# Patient Record
Sex: FEMALE | Race: WHITE | NOT HISPANIC OR LATINO | Employment: FULL TIME | URBAN - METROPOLITAN AREA
[De-identification: names, ages, dates, MRNs, and addresses within clinical notes are randomized per-mention and may not be internally consistent; named-entity substitution may affect disease eponyms.]

---

## 2018-01-23 ENCOUNTER — OFFICE VISIT (OUTPATIENT)
Dept: FAMILY MEDICINE CLINIC | Facility: CLINIC | Age: 43
End: 2018-01-23

## 2018-01-23 VITALS
BODY MASS INDEX: 28.99 KG/M2 | OXYGEN SATURATION: 99 % | HEART RATE: 78 BPM | TEMPERATURE: 97.9 F | HEIGHT: 65 IN | WEIGHT: 174 LBS | SYSTOLIC BLOOD PRESSURE: 126 MMHG | DIASTOLIC BLOOD PRESSURE: 82 MMHG

## 2018-01-23 DIAGNOSIS — Z86.69 HISTORY OF MIGRAINE: Primary | ICD-10-CM

## 2018-01-23 PROCEDURE — 99203 OFFICE O/P NEW LOW 30 MIN: CPT | Performed by: PHYSICIAN ASSISTANT

## 2018-01-23 RX ORDER — TOPIRAMATE 25 MG/1
TABLET ORAL
Qty: 60 TABLET | Refills: 11 | Status: SHIPPED | OUTPATIENT
Start: 2018-01-23 | End: 2021-02-08

## 2018-01-23 RX ORDER — TOPIRAMATE 25 MG/1
TABLET ORAL
Refills: 5 | COMMUNITY
Start: 2018-01-02 | End: 2018-01-23 | Stop reason: SDUPTHER

## 2018-01-23 RX ORDER — RIZATRIPTAN BENZOATE 10 MG/1
10 TABLET ORAL ONCE AS NEEDED
COMMUNITY
End: 2018-01-23 | Stop reason: DRUGHIGH

## 2018-01-23 RX ORDER — RIZATRIPTAN BENZOATE 10 MG/1
10 TABLET, ORALLY DISINTEGRATING ORAL ONCE AS NEEDED
Qty: 12 TABLET | Refills: 11 | Status: SHIPPED | OUTPATIENT
Start: 2018-01-23 | End: 2021-02-08

## 2018-01-23 RX ORDER — ONDANSETRON HYDROCHLORIDE 8 MG/1
8 TABLET, FILM COATED ORAL EVERY 8 HOURS PRN
Qty: 15 TABLET | Refills: 11 | Status: SHIPPED | OUTPATIENT
Start: 2018-01-23 | End: 2021-02-08

## 2018-01-23 NOTE — PROGRESS NOTES
Subjective   Elena Copeland is a 42 y.o. female  Chief Complaint: Patient comes in as a new patient to our practice to establish care.  She's here for follow-up on migraines.  History of Present Illness  Patient comes in today as a new patient to establish care.  She wants to discuss her chronic migraines which are currently stable.  She typically gets 1 migraine per month around her menstrual cycle last for 2 days.  This pattern is very stable on Topamax daily.  She has a history of migraines for the next several years and the pattern has not changed.  She states whether changes with premature pressure fluctuations can trigger migraine also.  She gets mild nausea, photophobia, migraine responds nicely to Maxalt and Zofran.  No migraine today.  The following portions of the patient's history were reviewed and updated as appropriate: allergies, current medications, past social history and problem list    Review of Systems   Constitutional: Negative for fatigue and unexpected weight change.   HENT: Negative for congestion, dental problem, postnasal drip, sinus pressure and sore throat.    Eyes: Negative for photophobia, pain and visual disturbance.   Gastrointestinal: Positive for nausea. Negative for vomiting.   Neurological: Positive for headaches. Negative for dizziness, syncope, facial asymmetry, speech difficulty, weakness, light-headedness and numbness.   Psychiatric/Behavioral: Negative for agitation, confusion, dysphoric mood and sleep disturbance. The patient is not nervous/anxious.        Objective     Vitals:    01/23/18 1427   BP: 126/82   Pulse: 78   Temp: 97.9 °F (36.6 °C)   SpO2: 99%       Physical Exam   Constitutional: She is oriented to person, place, and time. She appears well-developed and well-nourished.   HENT:   Head: Normocephalic and atraumatic.   Eyes: Conjunctivae and EOM are normal. Pupils are equal, round, and reactive to light.   Neck: Normal range of motion. Neck supple.    Cardiovascular: Normal rate and regular rhythm.    Pulmonary/Chest: Effort normal.   Neurological: She is alert and oriented to person, place, and time. No cranial nerve deficit or sensory deficit.   Psychiatric: She has a normal mood and affect. Her speech is normal and behavior is normal. Judgment and thought content normal. Cognition and memory are normal.   Nursing note and vitals reviewed.      Assessment/Plan     Diagnoses and all orders for this visit:    History of migraine  -     topiramate (TOPAMAX) 25 MG tablet; Take one twice daily  -     rizatriptan MLT (MAXALT-MLT) 10 MG disintegrating tablet; Take 1 tablet by mouth 1 (One) Time As Needed for Migraine for up to 1 dose. May repeat in 2 hours if needed  -     ondansetron (ZOFRAN) 8 MG tablet; Take 1 tablet by mouth Every 8 (Eight) Hours As Needed for Nausea or Vomiting.    Other orders  -     Discontinue: topiramate (TOPAMAX) 25 MG tablet; TAKE 1 TABLET BY MOUTH TWICE A DAY  -     Discontinue: rizatriptan (MAXALT) 10 MG tablet; Take 10 mg by mouth 1 (One) Time As Needed for Migraine. May repeat in 2 hours if needed

## 2018-11-26 ENCOUNTER — TELEPHONE (OUTPATIENT)
Dept: URGENT CARE | Facility: CLINIC | Age: 43
End: 2018-11-26

## 2018-11-26 NOTE — TELEPHONE ENCOUNTER
Patient referral appointment to ENT made for patient during visit to . Appointment set with [facility] for 11/29/18 at 1100. Patient given appointment information while here, VIKAS

## 2021-02-08 ENCOUNTER — OFFICE VISIT (OUTPATIENT)
Dept: INTERNAL MEDICINE | Facility: CLINIC | Age: 46
End: 2021-02-08

## 2021-02-08 VITALS
OXYGEN SATURATION: 96 % | BODY MASS INDEX: 31.49 KG/M2 | HEART RATE: 92 BPM | TEMPERATURE: 97.7 F | HEIGHT: 65 IN | SYSTOLIC BLOOD PRESSURE: 117 MMHG | WEIGHT: 189 LBS | DIASTOLIC BLOOD PRESSURE: 78 MMHG

## 2021-02-08 DIAGNOSIS — Z13.220 LIPID SCREENING: ICD-10-CM

## 2021-02-08 DIAGNOSIS — Z13.0 SCREENING FOR BLOOD DISEASE: ICD-10-CM

## 2021-02-08 DIAGNOSIS — Z13.21 ENCOUNTER FOR VITAMIN DEFICIENCY SCREENING: ICD-10-CM

## 2021-02-08 DIAGNOSIS — F51.01 PRIMARY INSOMNIA: ICD-10-CM

## 2021-02-08 DIAGNOSIS — F33.1 MODERATE EPISODE OF RECURRENT MAJOR DEPRESSIVE DISORDER (HCC): Primary | ICD-10-CM

## 2021-02-08 DIAGNOSIS — F41.9 ANXIETY: ICD-10-CM

## 2021-02-08 DIAGNOSIS — Z13.29 THYROID DISORDER SCREENING: ICD-10-CM

## 2021-02-08 DIAGNOSIS — Z13.1 SCREENING FOR DIABETES MELLITUS: ICD-10-CM

## 2021-02-08 PROBLEM — U07.1 COVID-19 VIRUS INFECTION: Status: ACTIVE | Noted: 2021-02-08

## 2021-02-08 PROCEDURE — 99214 OFFICE O/P EST MOD 30 MIN: CPT | Performed by: NURSE PRACTITIONER

## 2021-02-08 RX ORDER — PROPRANOLOL HYDROCHLORIDE 20 MG/1
TABLET ORAL
Qty: 60 TABLET | Refills: 1 | Status: SHIPPED | OUTPATIENT
Start: 2021-02-08 | End: 2021-02-08

## 2021-02-08 RX ORDER — TRAZODONE HYDROCHLORIDE 50 MG/1
TABLET ORAL
Qty: 45 TABLET | Refills: 1 | Status: SHIPPED | OUTPATIENT
Start: 2021-02-08 | End: 2021-02-08

## 2021-02-08 RX ORDER — PROPRANOLOL HYDROCHLORIDE 20 MG/1
TABLET ORAL
Qty: 60 TABLET | Refills: 1 | Status: SHIPPED | OUTPATIENT
Start: 2021-02-08 | End: 2021-03-31 | Stop reason: SDUPTHER

## 2021-02-08 RX ORDER — TRAZODONE HYDROCHLORIDE 50 MG/1
TABLET ORAL
Qty: 45 TABLET | Refills: 1 | Status: SHIPPED | OUTPATIENT
Start: 2021-02-08 | End: 2021-03-08

## 2021-02-08 NOTE — PROGRESS NOTES
Subjective   Chief Complaint   Patient presents with   • Establish Care     depression      Elena Copeland is a 45 y.o. female here today for depression, anxiety, and insomnia. Dad passed away about one year ago and she has struggled since. Difficulty with COVID pandemic and social isolation. Feels very sad with lack of motivation. Mood has been unstable with irritability and agitation. Has tried zoloft in the past that helped. Has anxiety with increased heart rate and shakiness. Has recent weight gain and about 40lbs in past year. Not sleeping well. Has difficulty going to sleep and wakes up frequently throughout the night. Has experienced this for years. Taking 10mg of melatonin nightly and no longer helping. Tried multiple OTC sleep aids with no improvement. No thoughts of self harm or harming others.     I have reviewed the following portions of the patient's history and confirmed they are accurate: allergies, current medications, past family history, past medical history, past social history, past surgical history and problem list    I have personally completed the patient's review of systems.    Review of Systems   Constitutional: Negative for activity change, appetite change, chills, diaphoresis, fatigue, fever, unexpected weight gain and unexpected weight loss.   HENT: Negative for ear discharge, ear pain, mouth sores, nosebleeds, sinus pressure, sneezing and sore throat.    Eyes: Negative for pain, discharge and itching.   Respiratory: Negative for cough, chest tightness, shortness of breath and wheezing.    Cardiovascular: Negative for chest pain, palpitations and leg swelling.   Gastrointestinal: Negative for abdominal pain, constipation, diarrhea, nausea and vomiting.   Endocrine: Negative for heat intolerance, polydipsia and polyphagia.   Genitourinary: Negative for dysuria, flank pain, frequency, hematuria and urgency.   Musculoskeletal: Negative for arthralgias, back pain, gait problem, joint  "swelling, myalgias, neck pain and neck stiffness.   Skin: Negative for color change, pallor and rash.   Allergic/Immunologic: Negative for immunocompromised state.   Neurological: Negative for seizures, speech difficulty, weakness and numbness.   Hematological: Negative for adenopathy.   Psychiatric/Behavioral: Positive for decreased concentration, dysphoric mood, sleep disturbance, depressed mood and stress. Negative for agitation and suicidal ideas. The patient is nervous/anxious.        Current Outpatient Medications on File Prior to Visit   Medication Sig   • Omeprazole (PRILOSEC PO) Prilosec   • [DISCONTINUED] MethylPREDNISolone (MEDROL, RO,) 4 MG tablet Take as directed on package instructions.   • [DISCONTINUED] ondansetron (ZOFRAN) 8 MG tablet Take 1 tablet by mouth Every 8 (Eight) Hours As Needed for Nausea or Vomiting.   • [DISCONTINUED] promethazine-dextromethorphan (PROMETHAZINE-DM) 6.25-15 MG/5ML syrup Take 5 mL by mouth 4 (Four) Times a Day As Needed for Cough.   • [DISCONTINUED] rizatriptan MLT (MAXALT-MLT) 10 MG disintegrating tablet Take 1 tablet by mouth 1 (One) Time As Needed for Migraine for up to 1 dose. May repeat in 2 hours if needed   • [DISCONTINUED] topiramate (TOPAMAX) 25 MG tablet Take one twice daily     No current facility-administered medications on file prior to visit.        Objective   Vitals:    02/08/21 0850   BP: 117/78   Pulse: 92   Temp: 97.7 °F (36.5 °C)   TempSrc: Temporal   SpO2: 96%   Weight: 85.7 kg (189 lb)   Height: 165.1 cm (65\")     Body mass index is 31.45 kg/m².    Physical Exam  Vitals signs reviewed.   Constitutional:       Appearance: Normal appearance. She is well-developed.   HENT:      Head: Normocephalic and atraumatic.      Nose: Nose normal.   Eyes:      General: Lids are normal.      Conjunctiva/sclera: Conjunctivae normal.      Pupils: Pupils are equal, round, and reactive to light.   Neck:      Thyroid: No thyromegaly.      Trachea: Trachea normal. "   Cardiovascular:      Rate and Rhythm: Normal rate and regular rhythm.      Heart sounds: Normal heart sounds.   Pulmonary:      Effort: Pulmonary effort is normal. No respiratory distress.      Breath sounds: Normal breath sounds.   Skin:     General: Skin is warm and dry.   Neurological:      Mental Status: She is alert and oriented to person, place, and time.      GCS: GCS eye subscore is 4. GCS verbal subscore is 5. GCS motor subscore is 6.   Psychiatric:         Attention and Perception: Attention normal.         Mood and Affect: Mood is anxious and depressed.         Speech: Speech normal.         Behavior: Behavior normal. Behavior is cooperative.         Thought Content: Thought content normal.         Assessment/Plan   Problem List Items Addressed This Visit     None      Visit Diagnoses     Moderate episode of recurrent major depressive disorder (CMS/HCC)    -  Primary  Chronic issue unstable requiring medication management and monitoring. Will eat well balanced diet, increase water intake, increase physical activity during the day, and get adequate rest. Discussed relaxation and coping skills and exercises.   Start zoloft and trazodone.     Relevant Medications    sertraline (Zoloft) 50 MG tablet    traZODone (DESYREL) 50 MG tablet    Anxiety      Chronic issue unstable requiring medication management and monitoring. Will eat well balanced diet, increase water intake, increase physical activity during the day, and get adequate rest. Discussed relaxation and coping skills and exercises.   Start zoloft and propranolol.       Relevant Medications    propranolol (INDERAL) 20 MG tablet    sertraline (Zoloft) 50 MG tablet    Primary insomnia      Chronic issue unstable requiring medication management and monitoring. Will eat well balanced diet, drink adequate water decreasing caffeine intake, and increase physical activity during the day. Discussed relaxation and coping skills and exercises. Discussed sleep  hygiene and limiting electronic devices prior to bedtime.    Start trazodone. Can continue melatonin.       Relevant Medications    traZODone (DESYREL) 50 MG tablet    Screening for blood disease        Relevant Orders    CBC (No Diff)    Comprehensive Metabolic Panel    Lipid Panel    TSH Rfx On Abnormal To Free T4    Hemoglobin A1c    Vitamin B12 & Folate    Vitamin D 25 Hydroxy    Thyroid disorder screening        Relevant Orders    TSH Rfx On Abnormal To Free T4    Lipid screening        Relevant Orders    Lipid Panel    Encounter for vitamin deficiency screening        Relevant Orders    Vitamin B12 & Folate    Vitamin D 25 Hydroxy    Screening for diabetes mellitus        Relevant Orders    Hemoglobin A1c             Current Outpatient Medications:   •  Omeprazole (PRILOSEC PO), Prilosec, Disp: , Rfl:   •  propranolol (INDERAL) 20 MG tablet, Take 1/2 to 1 tablet by mouth 3 times a day as needed for anxiety., Disp: 60 tablet, Rfl: 1  •  sertraline (Zoloft) 50 MG tablet, Take 1 tablet by mouth Daily., Disp: 30 tablet, Rfl: 1  •  traZODone (DESYREL) 50 MG tablet, Take 1-2 tablets one hour before bedtime as needed for sleep., Disp: 45 tablet, Rfl: 1       Plan of care reviewed with the patient at the conclusion of today's visit.  Education was provided regarding diagnosis, management, and any prescribed or recommended OTC medications.  Patient verbalized understanding of and agreement with management plan.     Return in about 1 month (around 3/8/2021), or if symptoms worsen or fail to improve, for ok telehealth. .      CHAVA Foley    Please note that portions of this note were completed with a voice recognition program. Efforts were made to edit the dictations, but occasionally words are mistranscribed.

## 2021-02-09 ENCOUNTER — LAB (OUTPATIENT)
Dept: LAB | Facility: HOSPITAL | Age: 46
End: 2021-02-09

## 2021-02-09 DIAGNOSIS — Z13.21 ENCOUNTER FOR VITAMIN DEFICIENCY SCREENING: ICD-10-CM

## 2021-02-09 DIAGNOSIS — Z13.1 SCREENING FOR DIABETES MELLITUS: ICD-10-CM

## 2021-02-09 DIAGNOSIS — Z13.220 LIPID SCREENING: ICD-10-CM

## 2021-02-09 DIAGNOSIS — Z13.29 THYROID DISORDER SCREENING: ICD-10-CM

## 2021-02-09 DIAGNOSIS — Z13.0 SCREENING FOR BLOOD DISEASE: ICD-10-CM

## 2021-02-09 LAB
25(OH)D3 SERPL-MCNC: 35.7 NG/ML (ref 30–100)
ALBUMIN SERPL-MCNC: 4 G/DL (ref 3.5–5.2)
ALBUMIN/GLOB SERPL: 1.3 G/DL
ALP SERPL-CCNC: 125 U/L (ref 39–117)
ALT SERPL W P-5'-P-CCNC: 49 U/L (ref 1–33)
ANION GAP SERPL CALCULATED.3IONS-SCNC: 11.7 MMOL/L (ref 5–15)
AST SERPL-CCNC: 34 U/L (ref 1–32)
BILIRUB SERPL-MCNC: 0.4 MG/DL (ref 0–1.2)
BUN SERPL-MCNC: 8 MG/DL (ref 6–20)
BUN/CREAT SERPL: 13.1 (ref 7–25)
CALCIUM SPEC-SCNC: 9.3 MG/DL (ref 8.6–10.5)
CHLORIDE SERPL-SCNC: 97 MMOL/L (ref 98–107)
CHOLEST SERPL-MCNC: 243 MG/DL (ref 0–200)
CO2 SERPL-SCNC: 27.3 MMOL/L (ref 22–29)
CREAT SERPL-MCNC: 0.61 MG/DL (ref 0.57–1)
DEPRECATED RDW RBC AUTO: 43.9 FL (ref 37–54)
ERYTHROCYTE [DISTWIDTH] IN BLOOD BY AUTOMATED COUNT: 13 % (ref 12.3–15.4)
FOLATE SERPL-MCNC: 17 NG/ML (ref 4.78–24.2)
GFR SERPL CREATININE-BSD FRML MDRD: 107 ML/MIN/1.73
GLOBULIN UR ELPH-MCNC: 3.2 GM/DL
GLUCOSE SERPL-MCNC: 77 MG/DL (ref 65–99)
HBA1C MFR BLD: 5.1 % (ref 4.8–5.6)
HCT VFR BLD AUTO: 40.8 % (ref 34–46.6)
HDLC SERPL-MCNC: 72 MG/DL (ref 40–60)
HGB BLD-MCNC: 13.3 G/DL (ref 12–15.9)
LDLC SERPL CALC-MCNC: 159 MG/DL (ref 0–100)
LDLC/HDLC SERPL: 2.18 {RATIO}
MCH RBC QN AUTO: 29.8 PG (ref 26.6–33)
MCHC RBC AUTO-ENTMCNC: 32.6 G/DL (ref 31.5–35.7)
MCV RBC AUTO: 91.3 FL (ref 79–97)
PLATELET # BLD AUTO: 222 10*3/MM3 (ref 140–450)
PMV BLD AUTO: 11 FL (ref 6–12)
POTASSIUM SERPL-SCNC: 4.1 MMOL/L (ref 3.5–5.2)
PROT SERPL-MCNC: 7.2 G/DL (ref 6–8.5)
RBC # BLD AUTO: 4.47 10*6/MM3 (ref 3.77–5.28)
SODIUM SERPL-SCNC: 136 MMOL/L (ref 136–145)
TRIGL SERPL-MCNC: 72 MG/DL (ref 0–150)
TSH SERPL DL<=0.05 MIU/L-ACNC: 1.64 UIU/ML (ref 0.27–4.2)
VIT B12 BLD-MCNC: 1258 PG/ML (ref 211–946)
VLDLC SERPL-MCNC: 12 MG/DL (ref 5–40)
WBC # BLD AUTO: 3.86 10*3/MM3 (ref 3.4–10.8)

## 2021-02-09 PROCEDURE — 82607 VITAMIN B-12: CPT

## 2021-02-09 PROCEDURE — 80061 LIPID PANEL: CPT

## 2021-02-09 PROCEDURE — 80053 COMPREHEN METABOLIC PANEL: CPT

## 2021-02-09 PROCEDURE — 83036 HEMOGLOBIN GLYCOSYLATED A1C: CPT

## 2021-02-09 PROCEDURE — 82746 ASSAY OF FOLIC ACID SERUM: CPT

## 2021-02-09 PROCEDURE — 82306 VITAMIN D 25 HYDROXY: CPT

## 2021-02-09 PROCEDURE — 84443 ASSAY THYROID STIM HORMONE: CPT

## 2021-02-09 PROCEDURE — 85027 COMPLETE CBC AUTOMATED: CPT

## 2021-02-23 NOTE — PROGRESS NOTES
My chart message:    Vitamin D is technically normal but on the lower end so make sure to be taking a daily multi-vitamin. Liver enzymes are a bit elevated and this is most commonly related to fatty liver disease from eating processed foods. Try and increase fresh fruits and vegetables and decrease canned foods. Cholesterol is elevated so follow a low fat and cholesterol diet. All of the rest of your blood work looks wonderful!! Other stuff just needs to be monitored. Want to repeat your fasting labs in 3 months.

## 2021-03-08 ENCOUNTER — OFFICE VISIT (OUTPATIENT)
Dept: INTERNAL MEDICINE | Facility: CLINIC | Age: 46
End: 2021-03-08

## 2021-03-08 DIAGNOSIS — F51.01 PRIMARY INSOMNIA: ICD-10-CM

## 2021-03-08 DIAGNOSIS — F33.1 MODERATE EPISODE OF RECURRENT MAJOR DEPRESSIVE DISORDER (HCC): Primary | ICD-10-CM

## 2021-03-08 DIAGNOSIS — F41.9 ANXIETY: ICD-10-CM

## 2021-03-08 PROCEDURE — 99443 PR PHYS/QHP TELEPHONE EVALUATION 21-30 MIN: CPT | Performed by: NURSE PRACTITIONER

## 2021-03-08 RX ORDER — QUETIAPINE FUMARATE 25 MG/1
TABLET, FILM COATED ORAL
Qty: 45 TABLET | Refills: 1 | Status: SHIPPED | OUTPATIENT
Start: 2021-03-08 | End: 2021-04-22

## 2021-03-08 NOTE — PROGRESS NOTES
Subjective   Chief Complaint   Patient presents with   • Anxiety   • Depression   • Insomnia      This is Telephone visit.  You have chosen to receive care through a telephone visit today. Do you consent to use a telephone visit for your medical care today? Yes    Elena Copeland is a 45 y.o. female here today for depression, anxiety, insomnia. Depression, anxiety, and insomnia pain.  Depression and anxiety have improved.  Zoloft has greatly helped with depression.  Propanolol is helping with anxiety and she is taking this about twice daily.  She is still not sleeping well. She is taking 100mg trazodone nightly and this is not causing any sleepiness. Having racing thoughts at night and unable to shut mind down.     I have reviewed the following portions of the patient's history and confirmed they are accurate: allergies, current medications, past family history, past medical history, past social history, past surgical history and problem list    I have personally completed the patient's review of systems.    Review of Systems   Constitutional: Negative for activity change, appetite change, chills, diaphoresis, fatigue, fever, unexpected weight gain and unexpected weight loss.   HENT: Negative for ear discharge, ear pain, mouth sores, nosebleeds, sinus pressure, sneezing and sore throat.    Eyes: Negative for pain, discharge and itching.   Respiratory: Negative for cough, chest tightness, shortness of breath and wheezing.    Cardiovascular: Negative for chest pain, palpitations and leg swelling.   Gastrointestinal: Negative for abdominal pain, constipation, diarrhea, nausea and vomiting.   Endocrine: Negative for heat intolerance, polydipsia and polyphagia.   Genitourinary: Negative for dysuria, flank pain, frequency, hematuria and urgency.   Musculoskeletal: Negative for arthralgias, back pain, gait problem, joint swelling, myalgias, neck pain and neck stiffness.   Skin: Negative for color change, pallor and rash.    Allergic/Immunologic: Negative for immunocompromised state.   Neurological: Negative for seizures, speech difficulty, weakness and numbness.   Hematological: Negative for adenopathy.   Psychiatric/Behavioral: Positive for sleep disturbance and stress. Negative for agitation, decreased concentration, dysphoric mood, suicidal ideas and depressed mood. The patient is not nervous/anxious.        Current Outpatient Medications on File Prior to Visit   Medication Sig   • Omeprazole (PRILOSEC PO) Prilosec   • propranolol (INDERAL) 20 MG tablet Take 1/2 to 1 tablet by mouth 3 times a day as needed for anxiety.   • sertraline (Zoloft) 50 MG tablet Take 1 tablet by mouth Daily.   • [DISCONTINUED] traZODone (DESYREL) 50 MG tablet Take 1-2 tablets one hour before bedtime as needed for sleep.     No current facility-administered medications on file prior to visit.       Objective   There were no vitals filed for this visit.  There is no height or weight on file to calculate BMI.    Physical Exam  Pulmonary:      Effort: No respiratory distress.   Neurological:      Mental Status: She is alert and oriented to person, place, and time.   Psychiatric:         Attention and Perception: Attention normal.         Mood and Affect: Mood normal.         Speech: Speech normal.         Behavior: Behavior is cooperative.         Thought Content: Thought content normal.         Assessment/Plan   Problem List Items Addressed This Visit        Mental Health    Moderate episode of recurrent major depressive disorder (CMS/HCC) - Primary    Overview     Chronic issue stable requiring medication management and monitoring. Will eat well balanced diet, increase water intake, increase physical activity during the day, and get adequate rest. Discussed relaxation and coping skills and exercises.   Continue zoloft.          Relevant Medications    QUEtiapine (SEROquel) 25 MG tablet    Anxiety    Overview     Chronic issue stable requiring medication  management and monitoring. Will eat well balanced diet, increase water intake, increase physical activity during the day, and get adequate rest. Discussed relaxation and coping skills and exercises.   Continue zoloft and propranolol            Sleep    Primary insomnia    Overview     Chronic issue unstable requiring medication management and monitoring. Will eat well balanced diet, drink adequate water decreasing caffeine intake, and increase physical activity during the day. Discussed relaxation and coping skills and exercises. Discussed sleep hygiene and limiting electronic devices prior to bedtime.    Start seroquel.          Relevant Medications    QUEtiapine (SEROquel) 25 MG tablet             Current Outpatient Medications:   •  Omeprazole (PRILOSEC PO), Prilosec, Disp: , Rfl:   •  propranolol (INDERAL) 20 MG tablet, Take 1/2 to 1 tablet by mouth 3 times a day as needed for anxiety., Disp: 60 tablet, Rfl: 1  •  QUEtiapine (SEROquel) 25 MG tablet, Take 1-2 tablets 30 minutes before bedtime as needed for sleep., Disp: 45 tablet, Rfl: 1  •  sertraline (Zoloft) 50 MG tablet, Take 1 tablet by mouth Daily., Disp: 30 tablet, Rfl: 1       Plan of care reviewed with the patient at the conclusion of today's visit.  Education was provided regarding diagnosis, management, and any prescribed or recommended OTC medications.  Patient verbalized understanding of and agreement with management plan.     Return in about 2 weeks (around 3/22/2021), or if symptoms worsen or fail to improve.     I spent 25 minutes in medical discussion with patient during this visit.     CHAVA Foley    Please note that portions of this note were completed with a voice recognition program. Efforts were made to edit the dictations, but occasionally words are mistranscribed.

## 2021-03-19 DIAGNOSIS — F51.01 PRIMARY INSOMNIA: ICD-10-CM

## 2021-03-19 RX ORDER — TRAZODONE HYDROCHLORIDE 50 MG/1
TABLET ORAL
Qty: 45 TABLET | Refills: 1 | OUTPATIENT
Start: 2021-03-19

## 2021-03-22 ENCOUNTER — OFFICE VISIT (OUTPATIENT)
Dept: INTERNAL MEDICINE | Facility: CLINIC | Age: 46
End: 2021-03-22

## 2021-03-22 DIAGNOSIS — F51.01 PRIMARY INSOMNIA: Primary | ICD-10-CM

## 2021-03-22 DIAGNOSIS — F33.1 MODERATE EPISODE OF RECURRENT MAJOR DEPRESSIVE DISORDER (HCC): ICD-10-CM

## 2021-03-22 DIAGNOSIS — F41.9 ANXIETY: ICD-10-CM

## 2021-03-22 PROCEDURE — 99443 PR PHYS/QHP TELEPHONE EVALUATION 21-30 MIN: CPT | Performed by: NURSE PRACTITIONER

## 2021-03-22 NOTE — PROGRESS NOTES
Subjective   Chief Complaint   Patient presents with   • Anxiety   • Depression   • Insomnia     This is Telephone visit.  You have chosen to receive care through a telephone visit today. Do you consent to use a telephone visit for your medical care today? Yes     Elena Copeland is a 45 y.o. female here today for depression, anxiety, and insomnia.  Depression and anxiety have remained stable with Zoloft daily and propranolol as needed.  She is sleeping better with Seroquel at bedtime.  25 mg makes her feel overly sedated the next day.  She is taking a half a pill of 12.5 mg nightly.  No thoughts of self-harm or harming others..    I have reviewed the following portions of the patient's history and confirmed they are accurate: allergies, current medications, past family history, past medical history, past social history, past surgical history and problem list    I have personally completed the patient's review of systems.    Review of Systems   Constitutional: Negative for activity change, appetite change, chills, diaphoresis, fatigue, fever, unexpected weight gain and unexpected weight loss.   HENT: Negative for ear discharge, ear pain, mouth sores, nosebleeds, sinus pressure, sneezing and sore throat.    Eyes: Negative for pain, discharge and itching.   Respiratory: Negative for cough, chest tightness, shortness of breath and wheezing.    Cardiovascular: Negative for chest pain, palpitations and leg swelling.   Gastrointestinal: Negative for abdominal pain, constipation, diarrhea, nausea and vomiting.   Endocrine: Negative for heat intolerance, polydipsia and polyphagia.   Genitourinary: Negative for dysuria, flank pain, frequency, hematuria and urgency.   Musculoskeletal: Negative for arthralgias, back pain, gait problem, joint swelling, myalgias, neck pain and neck stiffness.   Skin: Negative for color change, pallor and rash.   Allergic/Immunologic: Negative for immunocompromised state.   Neurological: Negative  for seizures, speech difficulty, weakness and numbness.   Hematological: Negative for adenopathy.   Psychiatric/Behavioral: Positive for stress. Negative for agitation, decreased concentration, dysphoric mood, sleep disturbance, suicidal ideas and depressed mood. The patient is not nervous/anxious.        Current Outpatient Medications on File Prior to Visit   Medication Sig   • Omeprazole (PRILOSEC PO) Prilosec   • propranolol (INDERAL) 20 MG tablet Take 1/2 to 1 tablet by mouth 3 times a day as needed for anxiety.   • QUEtiapine (SEROquel) 25 MG tablet Take 1-2 tablets 30 minutes before bedtime as needed for sleep.   • sertraline (Zoloft) 50 MG tablet Take 1 tablet by mouth Daily.     No current facility-administered medications on file prior to visit.       Objective   There were no vitals filed for this visit.  There is no height or weight on file to calculate BMI.    Physical Exam  Pulmonary:      Effort: No respiratory distress.   Neurological:      Mental Status: She is alert and oriented to person, place, and time.   Psychiatric:         Attention and Perception: Attention normal.         Mood and Affect: Mood normal.         Speech: Speech normal.         Behavior: Behavior is cooperative.         Thought Content: Thought content normal.         Assessment/Plan   Problem List Items Addressed This Visit        Mental Health    Moderate episode of recurrent major depressive disorder (CMS/HCC)    Overview     Chronic issue stable requiring medication management and monitoring. Will eat well balanced diet, increase water intake, increase physical activity during the day, and get adequate rest. Discussed relaxation and coping skills and exercises.   Continue zoloft.          Anxiety    Overview     Chronic issue stable requiring medication management and monitoring. Will eat well balanced diet, increase water intake, increase physical activity during the day, and get adequate rest. Discussed relaxation and  coping skills and exercises.   Continue zoloft and propranolol            Sleep    Primary insomnia - Primary    Overview     Chronic issue stable requiring medication management and monitoring. Will eat well balanced diet, drink adequate water decreasing caffeine intake, and increase physical activity during the day. Discussed relaxation and coping skills and exercises. Discussed sleep hygiene and limiting electronic devices prior to bedtime.    Continue seroquel.                   Current Outpatient Medications:   •  Omeprazole (PRILOSEC PO), Prilosec, Disp: , Rfl:   •  propranolol (INDERAL) 20 MG tablet, Take 1/2 to 1 tablet by mouth 3 times a day as needed for anxiety., Disp: 60 tablet, Rfl: 1  •  QUEtiapine (SEROquel) 25 MG tablet, Take 1-2 tablets 30 minutes before bedtime as needed for sleep., Disp: 45 tablet, Rfl: 1  •  sertraline (Zoloft) 50 MG tablet, Take 1 tablet by mouth Daily., Disp: 30 tablet, Rfl: 1       Plan of care reviewed with the patient at the conclusion of today's visit.  Education was provided regarding diagnosis, management, and any prescribed or recommended OTC medications.  Patient verbalized understanding of and agreement with management plan.     Return in about 2 months (around 5/22/2021), or if symptoms worsen or fail to improve.     I spent 25 minutes in medical discussion with patient during this visit.     CHAVA Foley    Please note that portions of this note were completed with a voice recognition program. Efforts were made to edit the dictations, but occasionally words are mistranscribed.

## 2021-03-31 DIAGNOSIS — F33.1 MODERATE EPISODE OF RECURRENT MAJOR DEPRESSIVE DISORDER (HCC): ICD-10-CM

## 2021-03-31 DIAGNOSIS — F41.9 ANXIETY: ICD-10-CM

## 2021-03-31 RX ORDER — PROPRANOLOL HYDROCHLORIDE 20 MG/1
TABLET ORAL
Qty: 60 TABLET | Refills: 5 | Status: SHIPPED | OUTPATIENT
Start: 2021-03-31

## 2021-04-01 DIAGNOSIS — F41.9 ANXIETY: ICD-10-CM

## 2021-04-01 DIAGNOSIS — F33.1 MODERATE EPISODE OF RECURRENT MAJOR DEPRESSIVE DISORDER (HCC): ICD-10-CM

## 2021-04-06 DIAGNOSIS — F41.9 ANXIETY: ICD-10-CM

## 2021-04-06 DIAGNOSIS — F33.1 MODERATE EPISODE OF RECURRENT MAJOR DEPRESSIVE DISORDER (HCC): ICD-10-CM

## 2021-04-06 RX ORDER — PROPRANOLOL HYDROCHLORIDE 20 MG/1
TABLET ORAL
Qty: 60 TABLET | Refills: 5 | Status: CANCELLED | OUTPATIENT
Start: 2021-04-06

## 2021-04-21 DIAGNOSIS — F51.01 PRIMARY INSOMNIA: ICD-10-CM

## 2021-04-22 RX ORDER — QUETIAPINE FUMARATE 25 MG/1
TABLET, FILM COATED ORAL
Qty: 45 TABLET | Refills: 5 | Status: SHIPPED | OUTPATIENT
Start: 2021-04-22 | End: 2021-05-10

## 2021-05-10 ENCOUNTER — LAB (OUTPATIENT)
Dept: LAB | Facility: HOSPITAL | Age: 46
End: 2021-05-10

## 2021-05-10 ENCOUNTER — OFFICE VISIT (OUTPATIENT)
Dept: INTERNAL MEDICINE | Facility: CLINIC | Age: 46
End: 2021-05-10

## 2021-05-10 VITALS
RESPIRATION RATE: 16 BRPM | HEART RATE: 81 BPM | HEIGHT: 65 IN | WEIGHT: 189 LBS | OXYGEN SATURATION: 98 % | SYSTOLIC BLOOD PRESSURE: 120 MMHG | DIASTOLIC BLOOD PRESSURE: 76 MMHG | BODY MASS INDEX: 31.49 KG/M2 | TEMPERATURE: 96.9 F

## 2021-05-10 DIAGNOSIS — F51.01 PRIMARY INSOMNIA: ICD-10-CM

## 2021-05-10 DIAGNOSIS — E55.9 VITAMIN D DEFICIENCY: ICD-10-CM

## 2021-05-10 DIAGNOSIS — R74.8 ELEVATED LIVER ENZYMES: ICD-10-CM

## 2021-05-10 DIAGNOSIS — E78.00 PURE HYPERCHOLESTEROLEMIA: ICD-10-CM

## 2021-05-10 DIAGNOSIS — F41.8 DEPRESSION WITH ANXIETY: Primary | ICD-10-CM

## 2021-05-10 LAB
ALBUMIN SERPL-MCNC: 4.4 G/DL (ref 3.5–5.2)
ALBUMIN/GLOB SERPL: 1.4 G/DL
ALP SERPL-CCNC: 148 U/L (ref 39–117)
ALT SERPL W P-5'-P-CCNC: 37 U/L (ref 1–33)
ANION GAP SERPL CALCULATED.3IONS-SCNC: 12.6 MMOL/L (ref 5–15)
AST SERPL-CCNC: 34 U/L (ref 1–32)
BILIRUB SERPL-MCNC: 0.4 MG/DL (ref 0–1.2)
BUN SERPL-MCNC: 10 MG/DL (ref 6–20)
BUN/CREAT SERPL: 15.6 (ref 7–25)
CALCIUM SPEC-SCNC: 9.2 MG/DL (ref 8.6–10.5)
CHLORIDE SERPL-SCNC: 100 MMOL/L (ref 98–107)
CHOLEST SERPL-MCNC: 257 MG/DL (ref 0–200)
CO2 SERPL-SCNC: 25.4 MMOL/L (ref 22–29)
CREAT SERPL-MCNC: 0.64 MG/DL (ref 0.57–1)
GFR SERPL CREATININE-BSD FRML MDRD: 101 ML/MIN/1.73
GGT SERPL-CCNC: 37 U/L (ref 5–36)
GLOBULIN UR ELPH-MCNC: 3.2 GM/DL
GLUCOSE SERPL-MCNC: 87 MG/DL (ref 65–99)
HDLC SERPL-MCNC: 70 MG/DL (ref 40–60)
LDLC SERPL CALC-MCNC: 162 MG/DL (ref 0–100)
LDLC/HDLC SERPL: 2.27 {RATIO}
POTASSIUM SERPL-SCNC: 4.3 MMOL/L (ref 3.5–5.2)
PROT SERPL-MCNC: 7.6 G/DL (ref 6–8.5)
SODIUM SERPL-SCNC: 138 MMOL/L (ref 136–145)
TRIGL SERPL-MCNC: 141 MG/DL (ref 0–150)
VLDLC SERPL-MCNC: 25 MG/DL (ref 5–40)

## 2021-05-10 PROCEDURE — 99214 OFFICE O/P EST MOD 30 MIN: CPT | Performed by: NURSE PRACTITIONER

## 2021-05-10 PROCEDURE — 80061 LIPID PANEL: CPT | Performed by: NURSE PRACTITIONER

## 2021-05-10 PROCEDURE — 82977 ASSAY OF GGT: CPT | Performed by: NURSE PRACTITIONER

## 2021-05-10 PROCEDURE — 82306 VITAMIN D 25 HYDROXY: CPT | Performed by: NURSE PRACTITIONER

## 2021-05-10 PROCEDURE — 80053 COMPREHEN METABOLIC PANEL: CPT | Performed by: NURSE PRACTITIONER

## 2021-05-10 RX ORDER — OMEPRAZOLE 20 MG/1
20 CAPSULE, DELAYED RELEASE ORAL DAILY
Qty: 30 CAPSULE | Refills: 5 | Status: SHIPPED | OUTPATIENT
Start: 2021-05-10

## 2021-05-10 RX ORDER — AMITRIPTYLINE HYDROCHLORIDE 25 MG/1
TABLET, FILM COATED ORAL
Qty: 45 TABLET | Refills: 1 | Status: SHIPPED | OUTPATIENT
Start: 2021-05-10 | End: 2021-06-18

## 2021-05-10 RX ORDER — SERTRALINE HYDROCHLORIDE 100 MG/1
100 TABLET, FILM COATED ORAL DAILY
Qty: 30 TABLET | Refills: 5 | Status: SHIPPED | OUTPATIENT
Start: 2021-05-10 | End: 2021-06-03 | Stop reason: SDUPTHER

## 2021-05-10 NOTE — PROGRESS NOTES
Subjective   Chief Complaint   Patient presents with   • Insomnia     f/u -  Pt is fasting for labs      Elena Copeland is a 45 y.o. female here today for depression, anxiety, insomnia, and hyperlipidemia.  Patient has increase in depression and anxiety.  Zoloft is working well in the beginning.  She would like to increase the dose.  No thoughts of self-harm or harming others.  She is sleeping well with Seroquel but this makes her feel overly sedated.  She is taking around 1/4 of 25 mg tablet.  She tried trazodone that did not help.  She has difficulty falling asleep and wakes up frequently throughout the night when not taking medication.  Last set of labs showed some elevated cholesterol and elevated liver enzymes.  She is following a heart healthy low-cholesterol and fat diet.  She is trying to be more physically active.  She recently rolled her ankle and had left ankle fracture.  She is seeing orthopedics in [place].  Wearing left foot boot.  Taking over-the-counter vitamin D supplement for deficiency.      I have reviewed the following portions of the patient's history and confirmed they are accurate: allergies, current medications, past family history, past medical history, past social history, past surgical history and problem list    I have personally completed the patient's review of systems.    Review of Systems   Constitutional: Negative for activity change, appetite change, chills, diaphoresis, fatigue, fever, unexpected weight gain and unexpected weight loss.   HENT: Negative for ear discharge, ear pain, mouth sores, nosebleeds, sinus pressure, sneezing and sore throat.    Eyes: Negative for pain, discharge and itching.   Respiratory: Negative for cough, chest tightness, shortness of breath and wheezing.    Cardiovascular: Negative for chest pain, palpitations and leg swelling.   Gastrointestinal: Negative for abdominal pain, constipation, diarrhea, nausea and vomiting.   Endocrine: Negative for heat  "intolerance, polydipsia and polyphagia.   Genitourinary: Negative for dysuria, flank pain, frequency, hematuria and urgency.   Musculoskeletal: Positive for arthralgias. Negative for back pain, gait problem, joint swelling, myalgias, neck pain and neck stiffness.   Skin: Negative for color change, pallor and rash.   Allergic/Immunologic: Negative for immunocompromised state.   Neurological: Negative for seizures, speech difficulty, weakness and numbness.   Hematological: Negative for adenopathy.   Psychiatric/Behavioral: Positive for sleep disturbance, depressed mood and stress. Negative for agitation, decreased concentration, dysphoric mood and suicidal ideas. The patient is nervous/anxious.        Current Outpatient Medications on File Prior to Visit   Medication Sig   • propranolol (INDERAL) 20 MG tablet Take 1/2 to 1 tablet by mouth 3 times a day as needed for anxiety.   • [DISCONTINUED] Omeprazole (PRILOSEC PO) Prilosec   • [DISCONTINUED] QUEtiapine (SEROquel) 25 MG tablet TAKE 1-2 TABLETS 30 MINUTES BEFORE BEDTIME AS NEEDED FOR SLEEP.   • [DISCONTINUED] sertraline (Zoloft) 50 MG tablet Take 1 tablet by mouth Daily.     No current facility-administered medications on file prior to visit.       Objective   Vitals:    05/10/21 0841   BP: 120/76   Pulse: 81   Resp: 16   Temp: 96.9 °F (36.1 °C)   TempSrc: Temporal   SpO2: 98%   Weight: 85.7 kg (189 lb)   Height: 165.1 cm (65\")     Body mass index is 31.45 kg/m².    Physical Exam  Vitals reviewed.   Constitutional:       Appearance: Normal appearance. She is well-developed.   HENT:      Head: Normocephalic and atraumatic.      Nose: Nose normal.   Eyes:      General: Lids are normal.      Conjunctiva/sclera: Conjunctivae normal.      Pupils: Pupils are equal, round, and reactive to light.   Neck:      Thyroid: No thyromegaly.      Trachea: Trachea normal.   Pulmonary:      Effort: Pulmonary effort is normal. No respiratory distress.   Musculoskeletal:      Comments: " Wearing left foot boot.    Skin:     General: Skin is warm and dry.   Neurological:      Mental Status: She is alert and oriented to person, place, and time.      GCS: GCS eye subscore is 4. GCS verbal subscore is 5. GCS motor subscore is 6.   Psychiatric:         Attention and Perception: Attention normal.         Mood and Affect: Mood normal.         Speech: Speech normal.         Behavior: Behavior normal. Behavior is cooperative.         Assessment/Plan   Problem List Items Addressed This Visit        Cardiac and Vasculature    Pure hypercholesterolemia  Chronic unstable requiring medication management, lifestyle changes, and monitoring. Discussed how this being unstable increases risk of cardiovascular disease and adverse events. Will follow a [facility] healthy diet low in cholesterol and fat. Discussed increasing fiber intake. Suggested fish oil or omega 3 supplement of 1200mg twice daily. Educated on how these help lower triglycerides and LDL. Will drink adequate water and increase physical activity as tolerated. Discussed importance of medication compliance.       Relevant Orders    Lipid Panel       Endocrine and Metabolic    Vitamin D deficiency    Relevant Orders    Vitamin D 25 Hydroxy       Gastrointestinal Abdominal     Elevated liver enzymes    Relevant Orders    Comprehensive Metabolic Panel    Gamma GT       Mental Health    Depression with anxiety - Primary  Chronic issue unstable requiring medication management and monitoring. Will eat well balanced diet, increase water intake, increase physical activity during the day, and get adequate rest. Discussed relaxation and coping skills and exercises.   Increase Zoloft.  Continue propanolol as needed.      Relevant Medications    sertraline (Zoloft) 100 MG tablet    amitriptyline (ELAVIL) 25 MG tablet       Sleep    Primary insomnia    Overview     Chronic issue unstable requiring medication management and monitoring. Will eat well balanced diet, drink  adequate water decreasing caffeine intake, and increase physical activity during the day. Discussed relaxation and coping skills and exercises. Discussed sleep hygiene and limiting electronic devices prior to bedtime.    Start amitriptyline.  Stop Seroquel.                  Current Outpatient Medications:   •  propranolol (INDERAL) 20 MG tablet, Take 1/2 to 1 tablet by mouth 3 times a day as needed for anxiety., Disp: 60 tablet, Rfl: 5  •  amitriptyline (ELAVIL) 25 MG tablet, Take 1-2 tablets by mouth 30 minutes before bedtime., Disp: 45 tablet, Rfl: 1  •  omeprazole (PrilOSEC) 20 MG capsule, Take 1 capsule by mouth Daily., Disp: 30 capsule, Rfl: 5  •  sertraline (Zoloft) 100 MG tablet, Take 1 tablet by mouth Daily., Disp: 30 tablet, Rfl: 5       Plan of care reviewed with the patient at the conclusion of today's visit.  Education was provided regarding diagnosis, management, and any prescribed or recommended OTC medications.  Patient verbalized understanding of and agreement with management plan.     Return in 3 months (on 8/10/2021), or if symptoms worsen or fail to improve.      CHAVA Foley    Please note that portions of this note were completed with a voice recognition program. Efforts were made to edit the dictations, but occasionally words are mistranscribed.

## 2021-05-11 LAB — 25(OH)D3 SERPL-MCNC: 40 NG/ML

## 2021-05-24 ENCOUNTER — OFFICE VISIT (OUTPATIENT)
Dept: INTERNAL MEDICINE | Facility: CLINIC | Age: 46
End: 2021-05-24

## 2021-05-24 VITALS
BODY MASS INDEX: 30.99 KG/M2 | HEART RATE: 77 BPM | OXYGEN SATURATION: 96 % | WEIGHT: 186 LBS | HEIGHT: 65 IN | RESPIRATION RATE: 16 BRPM | DIASTOLIC BLOOD PRESSURE: 84 MMHG | TEMPERATURE: 96.8 F | SYSTOLIC BLOOD PRESSURE: 122 MMHG

## 2021-05-24 DIAGNOSIS — F33.1 MODERATE EPISODE OF RECURRENT MAJOR DEPRESSIVE DISORDER (HCC): ICD-10-CM

## 2021-05-24 DIAGNOSIS — F41.1 GENERALIZED ANXIETY DISORDER: Primary | ICD-10-CM

## 2021-05-24 DIAGNOSIS — F51.01 PRIMARY INSOMNIA: ICD-10-CM

## 2021-05-24 PROCEDURE — 99214 OFFICE O/P EST MOD 30 MIN: CPT | Performed by: NURSE PRACTITIONER

## 2021-05-24 RX ORDER — QUETIAPINE FUMARATE 25 MG/1
1 TABLET, FILM COATED ORAL DAILY
COMMUNITY
Start: 2021-05-24 | End: 2021-05-24

## 2021-05-24 NOTE — PROGRESS NOTES
Subjective   Chief Complaint   Patient presents with   • Depression     f/u  needing a work accomodations form filled out      Elena Copeland is a 45 y.o. female here today for depression, anxiety, and insomnia.  Depression has been stable and doing better.  Patient has generalized anxiety disorder and medications help but she still has occasional panic attack especially with new or unknown situations.  She has been having some panic attacks prior to work meetings causing decreased concentration and inability to focus.  She has an accommodation form today just wanting to make her employer aware of this issue.  The Zoloft and propanolol have been helping.  Amitriptyline has greatly helped with sleep.  She has stopped Seroquel.  No thoughts of self-harm or harming others.       I have reviewed the following portions of the patient's history and confirmed they are accurate: allergies, current medications, past family history, past medical history, past social history, past surgical history and problem list    I have personally completed the patient's review of systems.    Review of Systems   Constitutional: Negative for activity change, appetite change, chills, diaphoresis, fatigue, fever, unexpected weight gain and unexpected weight loss.   HENT: Negative for ear discharge, ear pain, mouth sores, nosebleeds, sinus pressure, sneezing and sore throat.    Eyes: Negative for pain, discharge and itching.   Respiratory: Negative for cough, chest tightness, shortness of breath and wheezing.    Cardiovascular: Negative for chest pain, palpitations and leg swelling.   Gastrointestinal: Negative for abdominal pain, constipation, diarrhea, nausea and vomiting.   Endocrine: Negative for heat intolerance, polydipsia and polyphagia.   Genitourinary: Negative for dysuria, flank pain, frequency, hematuria and urgency.   Musculoskeletal: Positive for arthralgias. Negative for back pain, gait problem, joint swelling, myalgias, neck  "pain and neck stiffness.   Skin: Negative for color change, pallor and rash.   Allergic/Immunologic: Negative for immunocompromised state.   Neurological: Negative for seizures, speech difficulty, weakness and numbness.   Hematological: Negative for adenopathy.   Psychiatric/Behavioral: Positive for decreased concentration and stress. Negative for agitation, dysphoric mood, sleep disturbance, suicidal ideas and depressed mood. The patient is nervous/anxious.        Current Outpatient Medications on File Prior to Visit   Medication Sig   • amitriptyline (ELAVIL) 25 MG tablet Take 1-2 tablets by mouth 30 minutes before bedtime.   • omeprazole (PrilOSEC) 20 MG capsule Take 1 capsule by mouth Daily.   • propranolol (INDERAL) 20 MG tablet Take 1/2 to 1 tablet by mouth 3 times a day as needed for anxiety.   • sertraline (Zoloft) 100 MG tablet Take 1 tablet by mouth Daily.   • [DISCONTINUED] QUEtiapine (SEROquel) 25 MG tablet Take 1 tablet by mouth Daily.     No current facility-administered medications on file prior to visit.       Objective   Vitals:    05/24/21 1325   BP: 122/84   Pulse: 77   Resp: 16   Temp: 96.8 °F (36 °C)   TempSrc: Temporal   SpO2: 96%   Weight: 84.4 kg (186 lb)   Height: 165.1 cm (65\")     Body mass index is 30.95 kg/m².    Physical Exam  Vitals reviewed.   Constitutional:       Appearance: Normal appearance. She is well-developed.   HENT:      Head: Normocephalic and atraumatic.      Nose: Nose normal.   Eyes:      General: Lids are normal.      Conjunctiva/sclera: Conjunctivae normal.      Pupils: Pupils are equal, round, and reactive to light.   Neck:      Thyroid: No thyromegaly.      Trachea: Trachea normal.   Pulmonary:      Effort: Pulmonary effort is normal. No respiratory distress.   Skin:     General: Skin is warm and dry.   Neurological:      Mental Status: She is alert and oriented to person, place, and time.      GCS: GCS eye subscore is 4. GCS verbal subscore is 5. GCS motor subscore " is 6.   Psychiatric:         Attention and Perception: Attention normal.         Mood and Affect: Mood normal.         Speech: Speech normal.         Behavior: Behavior normal. Behavior is cooperative.         Assessment/Plan   Problem List Items Addressed This Visit        Mental Health    Moderate episode of recurrent major depressive disorder (CMS/HCC)    Overview     Chronic issue stable requiring medication management and monitoring. Will eat well balanced diet, increase water intake, increase physical activity during the day, and get adequate rest. Discussed relaxation and coping skills and exercises.   Continue zoloft.          Anxiety - Primary    Overview     Chronic issue unstable requiring medication management and monitoring. Will eat well balanced diet, increase water intake, increase physical activity during the day, and get adequate rest. Discussed relaxation and coping skills and exercises.   Continue zoloft and propranolol. Accomodation form completed.             Sleep    Primary insomnia    Overview     Chronic issue stable requiring medication management and monitoring. Will eat well balanced diet, drink adequate water decreasing caffeine intake, and increase physical activity during the day. Discussed relaxation and coping skills and exercises. Discussed sleep hygiene and limiting electronic devices prior to bedtime.    Continue amitriptyline                  Current Outpatient Medications:   •  amitriptyline (ELAVIL) 25 MG tablet, Take 1-2 tablets by mouth 30 minutes before bedtime., Disp: 45 tablet, Rfl: 1  •  omeprazole (PrilOSEC) 20 MG capsule, Take 1 capsule by mouth Daily., Disp: 30 capsule, Rfl: 5  •  propranolol (INDERAL) 20 MG tablet, Take 1/2 to 1 tablet by mouth 3 times a day as needed for anxiety., Disp: 60 tablet, Rfl: 5  •  sertraline (Zoloft) 100 MG tablet, Take 1 tablet by mouth Daily., Disp: 30 tablet, Rfl: 5       Plan of care reviewed with the patient at the conclusion of  today's visit.  Education was provided regarding diagnosis, management, and any prescribed or recommended OTC medications.  Patient verbalized understanding of and agreement with management plan.     Return in about 6 months (around 11/24/2021), or if symptoms worsen or fail to improve.      CHAVA Foley    Please note that portions of this note were completed with a voice recognition program. Efforts were made to edit the dictations, but occasionally words are mistranscribed.

## 2021-06-03 RX ORDER — SERTRALINE HYDROCHLORIDE 100 MG/1
100 TABLET, FILM COATED ORAL DAILY
Qty: 90 TABLET | Refills: 1 | Status: SHIPPED | OUTPATIENT
Start: 2021-06-03 | End: 2021-12-20

## 2021-06-18 RX ORDER — AMITRIPTYLINE HYDROCHLORIDE 25 MG/1
TABLET, FILM COATED ORAL
Qty: 45 TABLET | Refills: 1 | Status: SHIPPED | OUTPATIENT
Start: 2021-06-18

## 2021-06-22 ENCOUNTER — TELEMEDICINE (OUTPATIENT)
Dept: INTERNAL MEDICINE | Facility: CLINIC | Age: 46
End: 2021-06-22

## 2021-06-22 DIAGNOSIS — J06.9 UPPER RESPIRATORY TRACT INFECTION, UNSPECIFIED TYPE: ICD-10-CM

## 2021-06-22 DIAGNOSIS — R51.9 ACUTE INTRACTABLE HEADACHE, UNSPECIFIED HEADACHE TYPE: Primary | ICD-10-CM

## 2021-06-22 DIAGNOSIS — R05.9 COUGH: ICD-10-CM

## 2021-06-22 PROCEDURE — 99213 OFFICE O/P EST LOW 20 MIN: CPT | Performed by: NURSE PRACTITIONER

## 2021-06-22 RX ORDER — PREDNISONE 1 MG/1
TABLET ORAL
Qty: 21 TABLET | Refills: 0 | Status: SHIPPED | OUTPATIENT
Start: 2021-06-22 | End: 2021-07-01

## 2021-06-22 RX ORDER — ACETAMINOPHEN AND CODEINE PHOSPHATE 300; 30 MG/1; MG/1
1 TABLET ORAL EVERY 4 HOURS PRN
Qty: 12 TABLET | Refills: 0 | Status: SHIPPED | OUTPATIENT
Start: 2021-06-22 | End: 2021-06-25

## 2021-06-22 RX ORDER — AZITHROMYCIN 250 MG/1
TABLET, FILM COATED ORAL
Qty: 6 TABLET | Refills: 0 | Status: SHIPPED | OUTPATIENT
Start: 2021-06-22 | End: 2021-07-01

## 2021-06-22 RX ORDER — DEXTROMETHORPHAN HYDROBROMIDE AND PROMETHAZINE HYDROCHLORIDE 15; 6.25 MG/5ML; MG/5ML
5 SYRUP ORAL 4 TIMES DAILY PRN
Qty: 240 ML | Refills: 0 | Status: SHIPPED | OUTPATIENT
Start: 2021-06-22 | End: 2021-07-01

## 2021-07-01 ENCOUNTER — LAB (OUTPATIENT)
Dept: LAB | Facility: HOSPITAL | Age: 46
End: 2021-07-01

## 2021-07-01 ENCOUNTER — HOSPITAL ENCOUNTER (OUTPATIENT)
Dept: GENERAL RADIOLOGY | Facility: HOSPITAL | Age: 46
Discharge: HOME | End: 2021-07-01

## 2021-07-01 ENCOUNTER — OFFICE VISIT (OUTPATIENT)
Dept: INTERNAL MEDICINE | Facility: CLINIC | Age: 46
End: 2021-07-01

## 2021-07-01 VITALS
WEIGHT: 183 LBS | SYSTOLIC BLOOD PRESSURE: 118 MMHG | OXYGEN SATURATION: 96 % | HEART RATE: 127 BPM | BODY MASS INDEX: 30.49 KG/M2 | TEMPERATURE: 97.1 F | DIASTOLIC BLOOD PRESSURE: 72 MMHG | RESPIRATION RATE: 16 BRPM | HEIGHT: 65 IN

## 2021-07-01 DIAGNOSIS — R05.9 COUGH: ICD-10-CM

## 2021-07-01 DIAGNOSIS — R06.02 SHORT OF BREATH ON EXERTION: ICD-10-CM

## 2021-07-01 DIAGNOSIS — R61 DIAPHORESIS: ICD-10-CM

## 2021-07-01 DIAGNOSIS — R50.9 FEVER, UNSPECIFIED FEVER CAUSE: ICD-10-CM

## 2021-07-01 DIAGNOSIS — J40 BRONCHITIS: ICD-10-CM

## 2021-07-01 DIAGNOSIS — J98.8 RESPIRATORY INFECTION: Primary | ICD-10-CM

## 2021-07-01 DIAGNOSIS — R52 BODY ACHES: ICD-10-CM

## 2021-07-01 LAB
CK SERPL-CCNC: 33 U/L (ref 20–180)
CRP SERPL-MCNC: 5.5 MG/DL (ref 0–0.5)
DEPRECATED RDW RBC AUTO: 43.1 FL (ref 37–54)
ERYTHROCYTE [DISTWIDTH] IN BLOOD BY AUTOMATED COUNT: 13.5 % (ref 12.3–15.4)
HCT VFR BLD AUTO: 40 % (ref 34–46.6)
HGB BLD-MCNC: 13.7 G/DL (ref 12–15.9)
MCH RBC QN AUTO: 30.5 PG (ref 26.6–33)
MCHC RBC AUTO-ENTMCNC: 34.3 G/DL (ref 31.5–35.7)
MCV RBC AUTO: 89.1 FL (ref 79–97)
NT-PROBNP SERPL-MCNC: 37 PG/ML (ref 0–450)
PLATELET # BLD AUTO: 111 10*3/MM3 (ref 140–450)
PMV BLD AUTO: 10.9 FL (ref 6–12)
RBC # BLD AUTO: 4.49 10*6/MM3 (ref 3.77–5.28)
WBC # BLD AUTO: 2.26 10*3/MM3 (ref 3.4–10.8)

## 2021-07-01 PROCEDURE — 83880 ASSAY OF NATRIURETIC PEPTIDE: CPT | Performed by: NURSE PRACTITIONER

## 2021-07-01 PROCEDURE — 80053 COMPREHEN METABOLIC PANEL: CPT | Performed by: NURSE PRACTITIONER

## 2021-07-01 PROCEDURE — 82672 ASSAY OF ESTROGEN: CPT | Performed by: NURSE PRACTITIONER

## 2021-07-01 PROCEDURE — 71046 X-RAY EXAM CHEST 2 VIEWS: CPT

## 2021-07-01 PROCEDURE — 36415 COLL VENOUS BLD VENIPUNCTURE: CPT | Performed by: NURSE PRACTITIONER

## 2021-07-01 PROCEDURE — 85025 COMPLETE CBC W/AUTO DIFF WBC: CPT | Performed by: NURSE PRACTITIONER

## 2021-07-01 PROCEDURE — 86140 C-REACTIVE PROTEIN: CPT | Performed by: NURSE PRACTITIONER

## 2021-07-01 PROCEDURE — 85007 BL SMEAR W/DIFF WBC COUNT: CPT | Performed by: NURSE PRACTITIONER

## 2021-07-01 PROCEDURE — 85652 RBC SED RATE AUTOMATED: CPT | Performed by: NURSE PRACTITIONER

## 2021-07-01 PROCEDURE — 99214 OFFICE O/P EST MOD 30 MIN: CPT | Performed by: NURSE PRACTITIONER

## 2021-07-01 PROCEDURE — 82550 ASSAY OF CK (CPK): CPT | Performed by: NURSE PRACTITIONER

## 2021-07-01 RX ORDER — PREDNISONE 5 MG/1
TABLET ORAL
COMMUNITY
Start: 2021-06-22 | End: 2021-07-01

## 2021-07-01 RX ORDER — BENZONATATE 100 MG/1
CAPSULE ORAL
COMMUNITY
Start: 2021-06-16 | End: 2021-07-01

## 2021-07-01 RX ORDER — QUETIAPINE FUMARATE 25 MG/1
1 TABLET, FILM COATED ORAL NIGHTLY
COMMUNITY
Start: 2021-06-12 | End: 2021-07-01

## 2021-07-01 RX ORDER — ALBUTEROL SULFATE 90 UG/1
2 AEROSOL, METERED RESPIRATORY (INHALATION) EVERY 4 HOURS PRN
Qty: 18 G | Refills: 5 | Status: SHIPPED | OUTPATIENT
Start: 2021-07-01 | End: 2021-07-08

## 2021-07-01 NOTE — PROGRESS NOTES
Subjective   Chief Complaint   Patient presents with   • Cough   • Excessive Sweating      Elena Copeland is a 45 y.o. female here today for sick symptoms.  She has 4 weeks of upper and lower airway congestion, loose cough with shortness of air during coughing episodes and upon exertion, intermittent fevers with T-max of 103, and excessive sweating especially at night.  Symptoms started after motorcycle trip to [place]. She was seen by the [facility] on the 16th and prescribed Zyrtec and Flonase.  She was negative for Covid and flu.  She was seen by this provider on the 22nd and prescribed azithromycin and prednisone pack that did not help symptoms.  She was having abdominal pain with diarrhea and nausea but the symptoms have improved.  She feels very fatigued and exhausted.  No chest pain.  No known exposure to any other sick contacts.    I have reviewed the following portions of the patient's history and confirmed they are accurate: allergies, current medications, past family history, past medical history, past social history, past surgical history and problem list    I have personally completed the patient's review of systems.    Review of Systems   Constitutional: Positive for activity change, appetite change, diaphoresis, fatigue and fever. Negative for chills, unexpected weight gain and unexpected weight loss.   HENT: Positive for postnasal drip, rhinorrhea, sinus pressure, sore throat and voice change. Negative for congestion, ear discharge, ear pain, mouth sores, nosebleeds and sneezing.    Eyes: Negative for pain, discharge and itching.   Respiratory: Positive for cough, chest tightness and shortness of breath. Negative for wheezing.    Cardiovascular: Negative for chest pain, palpitations and leg swelling.   Gastrointestinal: Negative for abdominal pain, constipation, diarrhea, nausea and vomiting.   Endocrine: Negative for heat intolerance, polydipsia and polyphagia.   Genitourinary: Negative  "for dysuria, flank pain, frequency, hematuria and urgency.   Musculoskeletal: Positive for arthralgias and myalgias. Negative for back pain, gait problem, joint swelling, neck pain and neck stiffness.   Skin: Negative for color change, pallor and rash.   Allergic/Immunologic: Negative for immunocompromised state.   Neurological: Positive for headache. Negative for seizures, speech difficulty, weakness and numbness.   Hematological: Negative for adenopathy.   Psychiatric/Behavioral: Positive for stress. Negative for agitation, decreased concentration, dysphoric mood, sleep disturbance, suicidal ideas and depressed mood. The patient is nervous/anxious.        Current Outpatient Medications on File Prior to Visit   Medication Sig   • amitriptyline (ELAVIL) 25 MG tablet TAKE 1-2 TABLETS BY MOUTH 30 MINUTES BEFORE BEDTIME.   • omeprazole (PrilOSEC) 20 MG capsule Take 1 capsule by mouth Daily.   • sertraline (Zoloft) 100 MG tablet Take 1 tablet by mouth Daily.   • propranolol (INDERAL) 20 MG tablet Take 1/2 to 1 tablet by mouth 3 times a day as needed for anxiety.     No current facility-administered medications on file prior to visit.       Objective   Vitals:    07/01/21 1521   BP: 118/72   Pulse: (!) 127   Resp: 16   Temp: 97.1 °F (36.2 °C)   TempSrc: Temporal   SpO2: 96%   Weight: 83 kg (183 lb)   Height: 165.1 cm (65\")     Body mass index is 30.45 kg/m².    Physical Exam  Vitals reviewed.   Constitutional:       Appearance: Normal appearance. She is well-developed.   HENT:      Head: Normocephalic and atraumatic.      Nose: Mucosal edema and congestion present.      Mouth/Throat:      Pharynx: Posterior oropharyngeal erythema present.   Eyes:      General: Lids are normal.      Conjunctiva/sclera: Conjunctivae normal.      Pupils: Pupils are equal, round, and reactive to light.   Neck:      Thyroid: No thyromegaly.      Trachea: Trachea normal.   Cardiovascular:      Rate and Rhythm: Normal rate and regular rhythm. "      Heart sounds: Normal heart sounds.   Pulmonary:      Effort: Pulmonary effort is normal. No respiratory distress.      Breath sounds: Examination of the right-middle field reveals rhonchi. Examination of the left-middle field reveals rhonchi. Examination of the right-lower field reveals rhonchi. Examination of the left-lower field reveals rhonchi. Rhonchi present.      Comments: Frequent loose cough.   Abdominal:      Tenderness: There is no abdominal tenderness.   Lymphadenopathy:      Cervical: No cervical adenopathy.   Skin:     General: Skin is warm and dry.   Neurological:      Mental Status: She is alert and oriented to person, place, and time.      GCS: GCS eye subscore is 4. GCS verbal subscore is 5. GCS motor subscore is 6.   Psychiatric:         Attention and Perception: Attention normal.         Mood and Affect: Mood is anxious.         Speech: Speech normal.         Behavior: Behavior normal. Behavior is cooperative.         Thought Content: Thought content normal.         Assessment/Plan   Problem List Items Addressed This Visit     None      Visit Diagnoses     Respiratory infection    -  Primary  New onset issue requiring medication management. Suggested coolmist humidifier at home especially at bedtime to help with congestion and breathing.  Can use over-the-counter anti-histamines and plain Mucinex as needed. Will eat a well-balanced diet, increase water intake, and get adequate rest.  Discussed high rate of transmission through respiratory droplets. Recommended covering mouth when coughing and frequent hand hygiene. Concern for underlying cause due to symptoms persisting for one month. Labs and chest xray ordered. Course of treatment will be based on results.     Based on results doxycycline started, further labs ordered, CT of chest with contrast ordered, and ultrasound of abdomen ordered. Consult with Internal Medicine, Dr. Owen, and discussed all findings. Stat referrals made to  pulmonology, GI, and oncology.       Relevant Orders    CBC & Differential (Completed)    Comprehensive Metabolic Panel (Completed)    XR Chest 2 View (Completed)    Manual Differential (Completed)    Bronchitis      New onset issue requiring medication management. Suggested coolmist humidifier at home especially at bedtime to help with congestion and breathing.  Can use over-the-counter anti-histamines and plain Mucinex as needed. Will eat a well-balanced diet, increase water intake, and get adequate rest.        Relevant Orders    CBC & Differential (Completed)    Comprehensive Metabolic Panel (Completed)    XR Chest 2 View (Completed)    Manual Differential (Completed)    Cough      New onset issue requiring medication management. Suggested coolmist humidifier at home especially at bedtime to help with congestion and breathing.  Can use over-the-counter anti-histamines and plain Mucinex as needed. Will eat a well-balanced diet, increase water intake, and get adequate rest.  Discussed some natural remedies such as using honey to soothe the throat and ease cough and gargling warm salt water to help with inflammation in the throat. Rule out congestive heart or pulmonary issue      Relevant Orders    XR Chest 2 View (Completed)    proBNP (Completed)    Short of breath on exertion        Relevant Orders    XR Chest 2 View (Completed)    proBNP (Completed)    Fever, unspecified fever cause        Relevant Orders    CBC & Differential (Completed)    Sedimentation Rate (Completed)    C-reactive Protein (Completed)    CK (Completed)    Manual Differential (Completed)    Diaphoresis        Relevant Orders    CBC & Differential (Completed)    Comprehensive Metabolic Panel (Completed)    Estrogens, Total (Completed)    Manual Differential (Completed)    Body aches        Relevant Orders    Sedimentation Rate (Completed)    C-reactive Protein (Completed)    CK (Completed)             Current Outpatient Medications:   •   amitriptyline (ELAVIL) 25 MG tablet, TAKE 1-2 TABLETS BY MOUTH 30 MINUTES BEFORE BEDTIME., Disp: 45 tablet, Rfl: 1  •  omeprazole (PrilOSEC) 20 MG capsule, Take 1 capsule by mouth Daily., Disp: 30 capsule, Rfl: 5  •  sertraline (Zoloft) 100 MG tablet, Take 1 tablet by mouth Daily., Disp: 90 tablet, Rfl: 1  •  albuterol (PROVENTIL) (2.5 MG/3ML) 0.083% nebulizer solution, Take 2.5 mg by nebulization Every 4 (Four) Hours As Needed for Wheezing., Disp: 90 mL, Rfl: 5  •  ascorbic acid (VITAMIN C) 100 MG tablet, Take 2.5 tablets by mouth Daily., Disp: 30 tablet, Rfl: 1  •  cetirizine (zyrTEC) 10 MG tablet, Take 10 mg by mouth Daily., Disp: , Rfl:   •  [employer] Fluticasone Propionate 50 MCG/ACT nasal spray, 1 spray by Each Nare route Daily., Disp: , Rfl:   •  ferrous gluconate (FERGON) 324 MG tablet, Take 1 tablet by mouth Daily With Breakfast., Disp: 30 tablet, Rfl: 1  •  ibuprofen (ADVIL,MOTRIN) 200 MG tablet, Take 400 mg by mouth Every 6 (Six) Hours As Needed for Mild Pain ., Disp: , Rfl:   •  predniSONE (DELTASONE) 20 MG tablet, 1 tab a day for 4 more days, Disp: 21 tablet, Rfl: 1  •  promethazine-codeine (PHENERGAN with CODEINE) 6.25-10 MG/5ML solution, Take 5 mL by mouth Every 4 (Four) Hours As Needed for Cough., Disp: 240 mL, Rfl: 0  •  propranolol (INDERAL) 20 MG tablet, Take 1/2 to 1 tablet by mouth 3 times a day as needed for anxiety., Disp: 60 tablet, Rfl: 5  •  vitamin D3 125 MCG (5000 UT) capsule capsule, Take 2,000 Units by mouth Daily., Disp: , Rfl:   No current facility-administered medications for this visit.       Plan of care reviewed with the patient at the conclusion of today's visit.  Education was provided regarding diagnosis, management, and any prescribed or recommended OTC medications.  Patient verbalized understanding of and agreement with management plan.     Return if symptoms worsen or fail to improve.      Jenn Bee, APRN    Please note that portions of this note were completed with  a voice recognition program. Efforts were made to edit the dictations, but occasionally words are mistranscribed.

## 2021-07-02 LAB
ALBUMIN SERPL-MCNC: 3.6 G/DL (ref 3.5–5.2)
ALBUMIN/GLOB SERPL: 0.9 G/DL
ALP SERPL-CCNC: 107 U/L (ref 39–117)
ALT SERPL W P-5'-P-CCNC: 71 U/L (ref 1–33)
ANION GAP SERPL CALCULATED.3IONS-SCNC: 13.5 MMOL/L (ref 5–15)
AST SERPL-CCNC: 57 U/L (ref 1–32)
BILIRUB SERPL-MCNC: 0.4 MG/DL (ref 0–1.2)
BUN SERPL-MCNC: 12 MG/DL (ref 6–20)
BUN/CREAT SERPL: 13.6 (ref 7–25)
CALCIUM SPEC-SCNC: 8.7 MG/DL (ref 8.6–10.5)
CHLORIDE SERPL-SCNC: 95 MMOL/L (ref 98–107)
CO2 SERPL-SCNC: 23.5 MMOL/L (ref 22–29)
CREAT SERPL-MCNC: 0.88 MG/DL (ref 0.57–1)
EOSINOPHIL # BLD MANUAL: 0.02 10*3/MM3 (ref 0–0.4)
EOSINOPHIL NFR BLD MANUAL: 1 % (ref 0.3–6.2)
ERYTHROCYTE [SEDIMENTATION RATE] IN BLOOD: 53 MM/HR (ref 0–20)
GFR SERPL CREATININE-BSD FRML MDRD: 70 ML/MIN/1.73
GIANT PLATELETS: ABNORMAL
GLOBULIN UR ELPH-MCNC: 4 GM/DL
GLUCOSE SERPL-MCNC: 91 MG/DL (ref 65–99)
LYMPHOCYTES # BLD MANUAL: 0.34 10*3/MM3 (ref 0.7–3.1)
LYMPHOCYTES NFR BLD MANUAL: 15 % (ref 19.6–45.3)
LYMPHOCYTES NFR BLD MANUAL: 8 % (ref 5–12)
METAMYELOCYTES NFR BLD MANUAL: 1 % (ref 0–0)
MONOCYTES # BLD AUTO: 0.18 10*3/MM3 (ref 0.1–0.9)
NEUTROPHILS # BLD AUTO: 1.7 10*3/MM3 (ref 1.7–7)
NEUTROPHILS NFR BLD MANUAL: 75 % (ref 42.7–76)
POTASSIUM SERPL-SCNC: 4.4 MMOL/L (ref 3.5–5.2)
PROT SERPL-MCNC: 7.6 G/DL (ref 6–8.5)
RBC MORPH BLD: NORMAL
SODIUM SERPL-SCNC: 132 MMOL/L (ref 136–145)
WBC MORPH BLD: NORMAL

## 2021-07-02 NOTE — PROGRESS NOTES
My chart message:    So far your labs appear to show a viral infection. Your inflammatory markers are high showing you have significant inflammation in your body. I'm still waiting on your estrogen level to come back. Get chest xray done. I highly suggest repeating your labs after your start feeling better to make sure everything is trending back to normal or if you aren't feeling better I want to do further testing including autoimmune work up.

## 2021-07-06 DIAGNOSIS — R93.89 ABNORMAL X-RAY: ICD-10-CM

## 2021-07-06 DIAGNOSIS — R09.89 CHEST CONGESTION: ICD-10-CM

## 2021-07-06 DIAGNOSIS — R05.9 COUGH: Primary | ICD-10-CM

## 2021-07-06 DIAGNOSIS — R06.02 SHORT OF BREATH ON EXERTION: ICD-10-CM

## 2021-07-07 ENCOUNTER — LAB (OUTPATIENT)
Dept: LAB | Facility: HOSPITAL | Age: 46
End: 2021-07-07

## 2021-07-07 DIAGNOSIS — D72.810 LYMPHOPENIA: ICD-10-CM

## 2021-07-07 DIAGNOSIS — R50.9 FEVER, UNSPECIFIED FEVER CAUSE: Primary | ICD-10-CM

## 2021-07-07 DIAGNOSIS — R50.9 FEVER, UNSPECIFIED FEVER CAUSE: ICD-10-CM

## 2021-07-07 DIAGNOSIS — R70.0 ELEVATED SED RATE: ICD-10-CM

## 2021-07-07 DIAGNOSIS — R79.82 ELEVATED C-REACTIVE PROTEIN (CRP): ICD-10-CM

## 2021-07-07 LAB
ALBUMIN SERPL-MCNC: 3.3 G/DL (ref 3.5–5.2)
ALBUMIN/GLOB SERPL: 1 G/DL
ALP SERPL-CCNC: 125 U/L (ref 39–117)
ALT SERPL W P-5'-P-CCNC: 57 U/L (ref 1–33)
ANION GAP SERPL CALCULATED.3IONS-SCNC: 11.8 MMOL/L (ref 5–15)
AST SERPL-CCNC: 66 U/L (ref 1–32)
BILIRUB SERPL-MCNC: 0.4 MG/DL (ref 0–1.2)
BUN SERPL-MCNC: 9 MG/DL (ref 6–20)
BUN/CREAT SERPL: 13.4 (ref 7–25)
CALCIUM SPEC-SCNC: 8.7 MG/DL (ref 8.6–10.5)
CHLORIDE SERPL-SCNC: 96 MMOL/L (ref 98–107)
CHROMATIN AB SERPL-ACNC: 12.8 IU/ML (ref 0–14)
CO2 SERPL-SCNC: 25.2 MMOL/L (ref 22–29)
CREAT SERPL-MCNC: 0.67 MG/DL (ref 0.57–1)
CRP SERPL-MCNC: 7.54 MG/DL (ref 0–0.5)
DEPRECATED RDW RBC AUTO: 41.7 FL (ref 37–54)
ERYTHROCYTE [DISTWIDTH] IN BLOOD BY AUTOMATED COUNT: 13.3 % (ref 12.3–15.4)
GFR SERPL CREATININE-BSD FRML MDRD: 96 ML/MIN/1.73
GLOBULIN UR ELPH-MCNC: 3.4 GM/DL
GLUCOSE SERPL-MCNC: 83 MG/DL (ref 65–99)
HCT VFR BLD AUTO: 36.6 % (ref 34–46.6)
HGB BLD-MCNC: 12.5 G/DL (ref 12–15.9)
MCH RBC QN AUTO: 29.6 PG (ref 26.6–33)
MCHC RBC AUTO-ENTMCNC: 34.2 G/DL (ref 31.5–35.7)
MCV RBC AUTO: 86.5 FL (ref 79–97)
PLATELET # BLD AUTO: 94 10*3/MM3 (ref 140–450)
PMV BLD AUTO: 10.9 FL (ref 6–12)
POTASSIUM SERPL-SCNC: 3.9 MMOL/L (ref 3.5–5.2)
PROT SERPL-MCNC: 6.7 G/DL (ref 6–8.5)
RBC # BLD AUTO: 4.23 10*6/MM3 (ref 3.77–5.28)
SODIUM SERPL-SCNC: 133 MMOL/L (ref 136–145)
WBC # BLD AUTO: 1.73 10*3/MM3 (ref 3.4–10.8)

## 2021-07-07 PROCEDURE — 86038 ANTINUCLEAR ANTIBODIES: CPT

## 2021-07-07 PROCEDURE — 86696 HERPES SIMPLEX TYPE 2 TEST: CPT

## 2021-07-07 PROCEDURE — 81001 URINALYSIS AUTO W/SCOPE: CPT

## 2021-07-07 PROCEDURE — 80074 ACUTE HEPATITIS PANEL: CPT

## 2021-07-07 PROCEDURE — 86665 EPSTEIN-BARR CAPSID VCA: CPT

## 2021-07-07 PROCEDURE — 86140 C-REACTIVE PROTEIN: CPT

## 2021-07-07 PROCEDURE — G0432 EIA HIV-1/HIV-2 SCREEN: HCPCS

## 2021-07-07 PROCEDURE — 86618 LYME DISEASE ANTIBODY: CPT

## 2021-07-07 PROCEDURE — 87899 AGENT NOS ASSAY W/OPTIC: CPT

## 2021-07-07 PROCEDURE — 86431 RHEUMATOID FACTOR QUANT: CPT

## 2021-07-07 PROCEDURE — 86695 HERPES SIMPLEX TYPE 1 TEST: CPT

## 2021-07-07 PROCEDURE — 85652 RBC SED RATE AUTOMATED: CPT

## 2021-07-07 PROCEDURE — 86664 EPSTEIN-BARR NUCLEAR ANTIGEN: CPT

## 2021-07-07 PROCEDURE — 83605 ASSAY OF LACTIC ACID: CPT

## 2021-07-07 PROCEDURE — 87040 BLOOD CULTURE FOR BACTERIA: CPT

## 2021-07-07 PROCEDURE — 36415 COLL VENOUS BLD VENIPUNCTURE: CPT

## 2021-07-07 PROCEDURE — 85007 BL SMEAR W/DIFF WBC COUNT: CPT

## 2021-07-07 PROCEDURE — 87086 URINE CULTURE/COLONY COUNT: CPT

## 2021-07-07 PROCEDURE — 80053 COMPREHEN METABOLIC PANEL: CPT

## 2021-07-07 PROCEDURE — 86592 SYPHILIS TEST NON-TREP QUAL: CPT

## 2021-07-07 PROCEDURE — 85025 COMPLETE CBC W/AUTO DIFF WBC: CPT

## 2021-07-07 RX ORDER — DOXYCYCLINE 100 MG/1
100 CAPSULE ORAL 2 TIMES DAILY
Qty: 20 CAPSULE | Refills: 0 | Status: SHIPPED | OUTPATIENT
Start: 2021-07-07 | End: 2021-07-18 | Stop reason: HOSPADM

## 2021-07-07 NOTE — PROGRESS NOTES
My chart message:    Your chest xray shows some cloudiness that is most likely related to viral illness but I want to order a CT scan to further take a look at this. You will get a phone call with appt.

## 2021-07-08 ENCOUNTER — TELEPHONE (OUTPATIENT)
Dept: INTERNAL MEDICINE | Facility: CLINIC | Age: 46
End: 2021-07-08

## 2021-07-08 DIAGNOSIS — R05.9 COUGH: ICD-10-CM

## 2021-07-08 DIAGNOSIS — R93.89 ABNORMAL X-RAY: ICD-10-CM

## 2021-07-08 DIAGNOSIS — R05.9 COUGH: Primary | ICD-10-CM

## 2021-07-08 DIAGNOSIS — R50.9 FEVER, UNSPECIFIED FEVER CAUSE: ICD-10-CM

## 2021-07-08 DIAGNOSIS — D69.6 THROMBOCYTOPENIA (HCC): ICD-10-CM

## 2021-07-08 DIAGNOSIS — R74.8 ELEVATED LIVER ENZYMES: Primary | ICD-10-CM

## 2021-07-08 DIAGNOSIS — R06.02 SHORT OF BREATH ON EXERTION: ICD-10-CM

## 2021-07-08 LAB
BACTERIA UR QL AUTO: ABNORMAL /HPF
BASOPHILS # BLD MANUAL: 0.02 10*3/MM3 (ref 0–0.2)
BASOPHILS NFR BLD AUTO: 1.3 % (ref 0–1.5)
BILIRUB UR QL STRIP: ABNORMAL
CLARITY UR: ABNORMAL
COLOR UR: ABNORMAL
D-LACTATE SERPL-SCNC: 1.6 MMOL/L (ref 0.5–2)
ERYTHROCYTE [SEDIMENTATION RATE] IN BLOOD: 41 MM/HR (ref 0–20)
ESTROGEN SERPL-MCNC: 288 PG/ML
GLUCOSE UR STRIP-MCNC: NEGATIVE MG/DL
HAV IGM SERPL QL IA: NORMAL
HBV CORE IGM SERPL QL IA: NORMAL
HBV SURFACE AG SERPL QL IA: NORMAL
HCV AB SER DONR QL: NORMAL
HGB UR QL STRIP.AUTO: NEGATIVE
HIV1+2 AB SER QL: NORMAL
HYALINE CASTS UR QL AUTO: ABNORMAL /LPF
KETONES UR QL STRIP: ABNORMAL
L PNEUMO1 AG UR QL IA: NEGATIVE
LEUKOCYTE ESTERASE UR QL STRIP.AUTO: ABNORMAL
LYMPHOCYTES # BLD MANUAL: 0.16 10*3/MM3 (ref 0.7–3.1)
LYMPHOCYTES NFR BLD MANUAL: 2.6 % (ref 5–12)
LYMPHOCYTES NFR BLD MANUAL: 9 % (ref 19.6–45.3)
MONOCYTES # BLD AUTO: 0.04 10*3/MM3 (ref 0.1–0.9)
NEUTROPHILS # BLD AUTO: 1.51 10*3/MM3 (ref 1.7–7)
NEUTROPHILS NFR BLD MANUAL: 87.2 % (ref 42.7–76)
NITRITE UR QL STRIP: NEGATIVE
PATHOLOGY REVIEW: YES
PH UR STRIP.AUTO: 5.5 [PH] (ref 5–8)
PLAT MORPH BLD: NORMAL
PROT UR QL STRIP: ABNORMAL
RBC # UR: ABNORMAL /HPF
RBC MORPH BLD: NORMAL
REF LAB TEST METHOD: ABNORMAL
RPR SER QL: NORMAL
S PNEUM AG SPEC QL LA: NEGATIVE
SP GR UR STRIP: >=1.03 (ref 1–1.03)
SQUAMOUS #/AREA URNS HPF: ABNORMAL /HPF
UROBILINOGEN UR QL STRIP: ABNORMAL
WBC MORPH BLD: NORMAL
WBC UR QL AUTO: ABNORMAL /HPF

## 2021-07-08 RX ORDER — PROMETHAZINE HYDROCHLORIDE AND CODEINE PHOSPHATE 6.25; 1 MG/5ML; MG/5ML
5 SOLUTION ORAL EVERY 4 HOURS PRN
Qty: 240 ML | Refills: 0 | Status: ON HOLD | OUTPATIENT
Start: 2021-07-08 | End: 2021-07-21

## 2021-07-08 RX ORDER — ALBUTEROL SULFATE 2.5 MG/3ML
2.5 SOLUTION RESPIRATORY (INHALATION) EVERY 4 HOURS PRN
Qty: 90 ML | Refills: 5 | Status: SHIPPED | OUTPATIENT
Start: 2021-07-08

## 2021-07-09 ENCOUNTER — HOSPITAL ENCOUNTER (OUTPATIENT)
Dept: ULTRASOUND IMAGING | Facility: HOSPITAL | Age: 46
Discharge: HOME | End: 2021-07-09
Admitting: NURSE PRACTITIONER

## 2021-07-09 DIAGNOSIS — R74.8 ELEVATED LIVER ENZYMES: ICD-10-CM

## 2021-07-09 DIAGNOSIS — D69.6 THROMBOCYTOPENIA (HCC): ICD-10-CM

## 2021-07-09 LAB
ANA SER QL: NEGATIVE
B BURGDOR IGG+IGM SER-ACNC: <0.91 ISR (ref 0–0.9)
BACTERIA SPEC AEROBE CULT: NORMAL
EBV NA IGG SER IA-ACNC: 137 U/ML (ref 0–17.9)
EBV VCA IGG SER IA-ACNC: >600 U/ML (ref 0–17.9)
EBV VCA IGM SER IA-ACNC: <36 U/ML (ref 0–35.9)
HSV1 IGG SER IA-ACNC: 37.3 INDEX (ref 0–0.9)
HSV2 IGG SER IA-ACNC: <0.91 INDEX (ref 0–0.9)
LAB AP CASE REPORT: NORMAL
LAB AP CLINICAL INFORMATION: NORMAL
PATH REPORT.FINAL DX SPEC: NORMAL
PATH REPORT.GROSS SPEC: NORMAL
SERVICE CMNT-IMP: ABNORMAL

## 2021-07-09 PROCEDURE — 76700 US EXAM ABDOM COMPLETE: CPT

## 2021-07-10 DIAGNOSIS — R74.8 ELEVATED LIVER ENZYMES: Primary | ICD-10-CM

## 2021-07-10 DIAGNOSIS — R06.02 SHORT OF BREATH ON EXERTION: ICD-10-CM

## 2021-07-10 DIAGNOSIS — R50.9 FEVER, UNSPECIFIED FEVER CAUSE: ICD-10-CM

## 2021-07-10 DIAGNOSIS — R70.0 ELEVATED SED RATE: ICD-10-CM

## 2021-07-10 DIAGNOSIS — D72.810 LYMPHOPENIA: ICD-10-CM

## 2021-07-10 DIAGNOSIS — R05.9 COUGH: ICD-10-CM

## 2021-07-10 DIAGNOSIS — R91.8 LUNG NODULE, MULTIPLE: Primary | ICD-10-CM

## 2021-07-10 DIAGNOSIS — R16.1 SPLEEN ENLARGEMENT: ICD-10-CM

## 2021-07-10 DIAGNOSIS — R74.8 ELEVATED LIVER ENZYMES: ICD-10-CM

## 2021-07-10 DIAGNOSIS — D69.6 THROMBOCYTOPENIA (HCC): Primary | ICD-10-CM

## 2021-07-10 DIAGNOSIS — R79.82 ELEVATED C-REACTIVE PROTEIN (CRP): ICD-10-CM

## 2021-07-10 DIAGNOSIS — K76.0 HEPATIC STEATOSIS: ICD-10-CM

## 2021-07-10 DIAGNOSIS — D69.6 THROMBOCYTOPENIA (HCC): ICD-10-CM

## 2021-07-10 NOTE — PROGRESS NOTES
My chart message:  Ultrasound of abdomen shows an enlarged spleen. Normal spleen is 12cm at most. Yours is 16.2cm. You have fatty liver that could be causing this. The spleen helps produce white blood cells that fight infection. Due to you have spleen dysfunction your body may have harder time fighting off infection and this is why you are not getting better. I am referring you to gastroenterology to get their opinion. You will get called with an appt. Refrain from all tylenol and alcohol products.

## 2021-07-10 NOTE — PROGRESS NOTES
My chart message:    CT of chest shows some changes that are most likely related to a viral pneumonia so make sure to take all of the doxycycline I prescribed you. You do have 2 lung nodules. Normally we would just monitor these with a CT scan in the future but due to your symptoms I want to be safe and refer you on to pulmonology. You will get called with an appt.

## 2021-07-10 NOTE — PROGRESS NOTES
My chart message:    Blood cultures are negative for blood infection. You've had mono in the past but do not have current infection. Lyme disease is negative. Autoimmune disease tests are negative. Urine culture is negative for infection. See the result notes I sent you on CT of chest and abdominal US. You will get called with appts for pulmonology and Gastroenterology. I want you to come by the office on Friday for repeat of blood counts. I will have orders ready for you so just stop by. If you have to go to the ER for anything go to [facility] so they can see all of the test results and referrals. Let me know if you have any questions.

## 2021-07-12 ENCOUNTER — TELEPHONE (OUTPATIENT)
Dept: GASTROENTEROLOGY | Facility: CLINIC | Age: 46
End: 2021-07-12

## 2021-07-12 LAB
BACTERIA SPEC AEROBE CULT: NORMAL
HSV1 IGM TITR SER IF: NORMAL TITER
HSV2 IGM TITR SER IF: NORMAL TITER

## 2021-07-12 NOTE — TELEPHONE ENCOUNTER
Penelope,  We have received an urgent referral for this patient. There are several diagnoses listed. Could you please review her records and advise when to schedule?  Thank you,   Maryjo

## 2021-07-13 ENCOUNTER — TELEPHONE (OUTPATIENT)
Dept: INTERNAL MEDICINE | Facility: CLINIC | Age: 46
End: 2021-07-13

## 2021-07-13 ENCOUNTER — OFFICE VISIT (OUTPATIENT)
Dept: PULMONOLOGY | Facility: CLINIC | Age: 46
End: 2021-07-13

## 2021-07-13 VITALS
OXYGEN SATURATION: 88 % | HEART RATE: 108 BPM | BODY MASS INDEX: 30.51 KG/M2 | WEIGHT: 183.13 LBS | DIASTOLIC BLOOD PRESSURE: 54 MMHG | SYSTOLIC BLOOD PRESSURE: 100 MMHG | HEIGHT: 65 IN | TEMPERATURE: 97.7 F | RESPIRATION RATE: 20 BRPM

## 2021-07-13 DIAGNOSIS — R50.9 FEVER, UNSPECIFIED FEVER CAUSE: Primary | ICD-10-CM

## 2021-07-13 DIAGNOSIS — R70.0 ELEVATED SED RATE: ICD-10-CM

## 2021-07-13 DIAGNOSIS — R93.89 ABNORMAL CXR: ICD-10-CM

## 2021-07-13 DIAGNOSIS — R74.8 ELEVATED LIVER ENZYMES: ICD-10-CM

## 2021-07-13 DIAGNOSIS — R79.82 ELEVATED C-REACTIVE PROTEIN (CRP): ICD-10-CM

## 2021-07-13 DIAGNOSIS — Z00.6 EXAMINATION FOR NORMAL COMPARISON OR CONTROL IN CLINICAL RESEARCH: Primary | ICD-10-CM

## 2021-07-13 PROBLEM — R16.1 SPLENOMEGALY: Status: ACTIVE | Noted: 2021-07-13

## 2021-07-13 LAB — CRYPTOC AG CSF QL: NEGATIVE

## 2021-07-13 PROCEDURE — 83010 ASSAY OF HAPTOGLOBIN QUANT: CPT | Performed by: INTERNAL MEDICINE

## 2021-07-13 PROCEDURE — 85025 COMPLETE CBC W/AUTO DIFF WBC: CPT | Performed by: NURSE PRACTITIONER

## 2021-07-13 PROCEDURE — 86606 ASPERGILLUS ANTIBODY: CPT | Performed by: INTERNAL MEDICINE

## 2021-07-13 PROCEDURE — 85007 BL SMEAR W/DIFF WBC COUNT: CPT | Performed by: INTERNAL MEDICINE

## 2021-07-13 PROCEDURE — 82232 ASSAY OF BETA-2 PROTEIN: CPT | Performed by: INTERNAL MEDICINE

## 2021-07-13 PROCEDURE — 87899 AGENT NOS ASSAY W/OPTIC: CPT | Performed by: INTERNAL MEDICINE

## 2021-07-13 PROCEDURE — 86615 BORDETELLA ANTIBODY: CPT | Performed by: INTERNAL MEDICINE

## 2021-07-13 PROCEDURE — 99204 OFFICE O/P NEW MOD 45 MIN: CPT | Performed by: INTERNAL MEDICINE

## 2021-07-13 PROCEDURE — 85652 RBC SED RATE AUTOMATED: CPT | Performed by: NURSE PRACTITIONER

## 2021-07-13 PROCEDURE — 87449 NOS EACH ORGANISM AG IA: CPT | Performed by: INTERNAL MEDICINE

## 2021-07-13 PROCEDURE — 83615 LACTATE (LD) (LDH) ENZYME: CPT | Performed by: INTERNAL MEDICINE

## 2021-07-13 PROCEDURE — 86140 C-REACTIVE PROTEIN: CPT | Performed by: INTERNAL MEDICINE

## 2021-07-13 PROCEDURE — 80053 COMPREHEN METABOLIC PANEL: CPT | Performed by: INTERNAL MEDICINE

## 2021-07-13 PROCEDURE — 84145 PROCALCITONIN (PCT): CPT | Performed by: INTERNAL MEDICINE

## 2021-07-13 PROCEDURE — 86612 BLASTOMYCES ANTIBODY: CPT | Performed by: INTERNAL MEDICINE

## 2021-07-13 PROCEDURE — 86480 TB TEST CELL IMMUN MEASURE: CPT | Performed by: INTERNAL MEDICINE

## 2021-07-13 PROCEDURE — 87385 HISTOPLASMA CAPSUL AG IA: CPT | Performed by: INTERNAL MEDICINE

## 2021-07-13 RX ORDER — PREDNISONE 20 MG/1
TABLET ORAL
Qty: 21 TABLET | Refills: 1 | Status: ON HOLD | OUTPATIENT
Start: 2021-07-13 | End: 2021-07-18 | Stop reason: SDUPTHER

## 2021-07-13 RX ORDER — CETIRIZINE HYDROCHLORIDE 10 MG/1
10 TABLET ORAL DAILY
COMMUNITY
Start: 2021-06-16 | End: 2021-08-09

## 2021-07-13 RX ORDER — LACTOBACILLUS COMBINATION NO.4 3B CELL
1 CAPSULE ORAL DAILY
COMMUNITY
Start: 2021-06-16 | End: 2021-08-09

## 2021-07-13 RX ORDER — QUETIAPINE FUMARATE 25 MG/1
TABLET, FILM COATED ORAL
COMMUNITY
Start: 2021-07-05 | End: 2021-07-18 | Stop reason: HOSPADM

## 2021-07-13 NOTE — PATIENT INSTRUCTIONS
1. Lab work up today  2. RTC 2 weeks with CXR  3. If fevers persist, may need inpatient evaluation. Go to the Emergency Department.

## 2021-07-13 NOTE — PROGRESS NOTES
Pulmonary Clinic  Initial Visit       Reason for Visit:   Georgia is a 45 y.o. female here for follow up of: Lung Nodule, Shortness of Breath, and Cough        S     History of Present Illness    She is here for an evaluation of an abnormal CT with presence of a pulmonary nodule.    She has been having cough for about 4 weeks. No sputum production. No hemoptysis.  She has had fever most of this time. Up to 103 °F (last week).   This week it has been between 101-102 °F.  Today's temperature, during her evaluation was 97.7 °F.    She has been taking cough medicines and nebs without help.    She had a round of Azithromycin and Steroids at the beginning, without help.  Now she is the middle of a ten day course of DOXYcycline.    Occasional wheezes.    Nighttime diaphoresis and also during the day.       PMH: She  has a past medical history of GERD (gastroesophageal reflux disease), Headache, Hip pain, and Low back pain.   PSxH: She  has a past surgical history that includes Ankle surgery; Appendectomy; LASIK; and Sinus surgery.      Medications:  Current Outpatient Medications on File Prior to Visit   Medication Sig   • albuterol (PROVENTIL) (2.5 MG/3ML) 0.083% nebulizer solution Take 2.5 mg by nebulization Every 4 (Four) Hours As Needed for Wheezing.   • amitriptyline (ELAVIL) 25 MG tablet TAKE 1-2 TABLETS BY MOUTH 30 MINUTES BEFORE BEDTIME.   • cetirizine (zyrTEC) 10 MG tablet Take 10 mg by mouth Daily.   • [employer] Fluticasone Propionate 50 MCG/ACT nasal spray 1 spray by Each Nare route Daily.   • doxycycline (MONODOX) 100 MG capsule Take 1 capsule by mouth 2 (Two) Times a Day for 10 days.   • omeprazole (PrilOSEC) 20 MG capsule Take 1 capsule by mouth Daily.   • promethazine-codeine (PHENERGAN with CODEINE) 6.25-10 MG/5ML solution Take 5 mL by mouth Every 4 (Four) Hours As Needed for Cough.   • propranolol (INDERAL) 20 MG tablet Take 1/2 to 1 tablet by mouth 3 times a day as needed for anxiety.   • QUEtiapine (SEROquel)  "25 MG tablet TAKE 1 2 TABLETS 30 MINUTES BEFORE BEDTIME AS NEEDED FOR SLEEP.   • sertraline (Zoloft) 100 MG tablet Take 1 tablet by mouth Daily.     No current facility-administered medications on file prior to visit.       Allergies: She is allergic to eggs or egg-derived products and sudafed [pseudoephedrine].   FH: Her family history includes Cancer in her paternal grandmother; Depression in her mother; Diabetes in her father; Hypertension in her mother; Parkinsonism in her maternal grandmother.   SH: She  reports that she quit smoking about 13 years ago. Her smoking use included cigarettes. She has a 1.00 pack-year smoking history. She has never used smokeless tobacco.     The following portions of the chart were reviewed this encounter and updated as appropriate:      History     Last Reviewed by Domenico Claudio MD on 7/13/2021 at  3:45 PM    Sections Reviewed    Medical, Family, Tobacco, Surgical    Problem list reviewed by Domenico Claudio MD on 7/13/2021 at  3:39 PM  Medicines reviewed by Domenico Claudio MD on 7/13/2021 at  3:39 PM  Allergies reviewed by Domenico Claudio MD on 7/13/2021 at  3:39 PM     O     Visit Vitals  /54 (BP Location: Left arm, Patient Position: Sitting, Cuff Size: Adult)   Pulse 108   Temp 97.7 °F (36.5 °C)   Resp 20   Ht 165.1 cm (65\")   Wt 83.1 kg (183 lb 2 oz)   SpO2 (!) 88% Comment: room air at rest   BMI 30.47 kg/m²        Physical Examination    Constitutional:  No acute distress.   Neck: No JVD.   Cardiovascular: Normal rate, regular and rhythm.  No murmurs, gallop or rub.   Respiratory: Rhonchi.   Extremities: No Edema     Hematology:  Results from last 7 days   Lab Units 07/07/21  1419   WBC 10*3/mm3 1.73*   HEMOGLOBIN g/dL 12.5   MCV fL 86.5   PLATELETS 10*3/mm3 94*   NEUTROS ABS 10*3/mm3 1.51*     Chemistry:  Estimated Creatinine Clearance: 115.2 mL/min (by C-G formula based on SCr of 0.67 mg/dL).     Results from last 7 days   Lab Units 07/07/21  1419   SODIUM mmol/L " 133*   POTASSIUM mmol/L 3.9   CHLORIDE mmol/L 96*   CO2 mmol/L 25.2   BUN mg/dL 9   CREATININE mg/dL 0.67   GLUCOSE mg/dL 83     Results from last 7 days   Lab Units 07/07/21  1419   CALCIUM mg/dL 8.7     Results from last 7 days   Lab Units 07/07/21  1419   BILIRUBIN mg/dL 0.4   AST (SGOT) U/L 66*   ALT (SGPT) U/L 57*   ALK PHOS U/L 125*     Biomarkers:  Results from last 7 days   Lab Units 07/07/21  1419   CRP mg/dL 7.54*   LACTATE mmol/L 1.6     Diagnostic Data  CT Scan of Chest done at [facility] 7/9/21 (see below).    XR Chest 2 View    Result Date: 7/2/2021  Mild granular diffuse opacities are noted involving the mid and lower lung fields concerning for possible interstitial abnormality or more diffuse mild airspace disease. Consider CT to further evaluate.  This report was finalized on 7/2/2021 12:00 PM by Manpreet Milton.      US Abdomen Complete    Result Date: 7/9/2021  1. Increased echogenicity throughout the hepatic parenchyma consistent with parenchymal disease process such as hepatic steatosis. No focal liver lesion.  2. Splenomegaly measuring up to 16.2 cm concerning for developing portal hypertension in the setting of hepatic parenchymal process.  3. No ascites.  D:  07/09/2021 E:  07/09/2021     This report was finalized on 7/9/2021 4:44 PM by Dr. Drew Valencia.         SpO2 Readings from Last 3 Encounters:   07/13/21 (!) 88%   07/01/21 96%   05/24/21 96%      Assessment/Plan    A / P     Assessment:    1. FUO, splenomegaly, thrombocytopenia and  Leukopenia (lymphopenia).  a. Broad differential diagnosis, including hematological disorders, infection, sarcoidosis, infiltrative disorders.  b. R/o Hypersplenism  2. Abnormal CT - [facility] - 07/09/21:   a. Left peribronchial thickening likely inflammatory.   b. Left basilar atelectasis.  c. Nodule in the RLL, 5 mm.  d. Vague right perihilar nodularity 12 mm.  e. Vague pleural-based nodule in the lingula.  f. No adenopathy.  g. Marked Splenomegaly, 16 cm  h. Fatty  infiltration of the liver.  3. Dyslipidemia   4. Mild Elevation of transaminases.  5. GERD on PPI  6. Major depressive disorder.  7. Previous Covid-19 (Dec 2020).    Plan:  Orders Placed This Encounter   Procedures   • Blastomyces Antigen - Urine, Urine, Clean Catch   • Bordetella Pertussis Antibody   • Fungal Antibodies, Quantitative Double Immunodiffusion   • Fungitell B-D Glucan   • Histoplasma Ag Ur - Urine, Clean Catch   • Procalcitonin   • QuantiFERON TB Plus Client Incubated   • Aspergillus Antibodies   • C-reactive Protein   • Comprehensive Metabolic Panel   • Lactate Dehydrogenase   • Cryptococcal Antigen   • CBC & Differential     Patient Instructions   1. Lab work up today  2. RTC 2 weeks with CXR  3. If fevers persist, may need inpatient evaluation. Go to the Emergency Department.  4. Prednisone trial.  5. We will re-assess with today's labs. She may need a referral to Hematology, including evaluation for a Bone Marrow biopsy.  6. At this time, there is no indication for a specific diagnostic procedure for the pulmonary nodules.      Return in about 2 weeks (around 7/27/2021) for Recheck with CXR PA and Lateral.    I discussed the diagnosis, evaluation, and  treatment options with the patient and/or appropriate family members.    I spent 46 minutes on this date of service. This time includes time spent by me in the following activities:preparing for the visit, reviewing tests, obtaining and/or reviewing a separately obtained history, performing a medically appropriate examination, counseling the patient/family/caregiver, ordering medications, tests, or procedures, and/or documenting information in the medical record.    C.C.: Jenn Mcclellan, CHAVA

## 2021-07-14 ENCOUNTER — HOSPITAL ENCOUNTER (INPATIENT)
Facility: HOSPITAL | Age: 46
LOS: 2 days | Discharge: HOME | End: 2021-07-18
Attending: FAMILY MEDICINE | Admitting: INTERNAL MEDICINE

## 2021-07-14 ENCOUNTER — NURSE NAVIGATOR (OUTPATIENT)
Dept: ONCOLOGY | Facility: CLINIC | Age: 46
End: 2021-07-14

## 2021-07-14 DIAGNOSIS — R16.1 SPLENOMEGALY: ICD-10-CM

## 2021-07-14 DIAGNOSIS — D69.6 THROMBOCYTOPENIA (HCC): ICD-10-CM

## 2021-07-14 DIAGNOSIS — R50.9 FEVER, UNKNOWN ORIGIN: ICD-10-CM

## 2021-07-14 DIAGNOSIS — D72.810 LYMPHOPENIA: Primary | ICD-10-CM

## 2021-07-14 PROBLEM — R09.02 HYPOXIA: Status: ACTIVE | Noted: 2021-07-14

## 2021-07-14 LAB
ALBUMIN SERPL-MCNC: 3 G/DL (ref 3.5–5.2)
ALBUMIN SERPL-MCNC: 3.1 G/DL (ref 3.5–5.2)
ALBUMIN/GLOB SERPL: 0.9 G/DL
ALBUMIN/GLOB SERPL: 0.9 G/DL
ALP SERPL-CCNC: 153 U/L (ref 39–117)
ALP SERPL-CCNC: 158 U/L (ref 39–117)
ALT SERPL W P-5'-P-CCNC: 38 U/L (ref 1–33)
ALT SERPL W P-5'-P-CCNC: 48 U/L (ref 1–33)
ANION GAP SERPL CALCULATED.3IONS-SCNC: 12 MMOL/L (ref 5–15)
ANION GAP SERPL CALCULATED.3IONS-SCNC: 12.4 MMOL/L (ref 5–15)
AST SERPL-CCNC: 49 U/L (ref 1–32)
AST SERPL-CCNC: 62 U/L (ref 1–32)
B2 MICROGLOB SERPL-MCNC: 7.1 MG/L (ref 0.8–2.2)
BASOPHILS # BLD AUTO: 0.01 10*3/MM3 (ref 0–0.2)
BASOPHILS # BLD MANUAL: 0.02 10*3/MM3 (ref 0–0.2)
BASOPHILS NFR BLD AUTO: 0.2 % (ref 0–1.5)
BASOPHILS NFR BLD AUTO: 1 % (ref 0–1.5)
BILIRUB SERPL-MCNC: 0.4 MG/DL (ref 0–1.2)
BILIRUB SERPL-MCNC: 0.4 MG/DL (ref 0–1.2)
BUN SERPL-MCNC: 10 MG/DL (ref 6–20)
BUN SERPL-MCNC: 8 MG/DL (ref 6–20)
BUN/CREAT SERPL: 11.3 (ref 7–25)
BUN/CREAT SERPL: 15.9 (ref 7–25)
CALCIUM SPEC-SCNC: 8.3 MG/DL (ref 8.6–10.5)
CALCIUM SPEC-SCNC: 8.7 MG/DL (ref 8.6–10.5)
CHLORIDE SERPL-SCNC: 104 MMOL/L (ref 98–107)
CHLORIDE SERPL-SCNC: 95 MMOL/L (ref 98–107)
CO2 SERPL-SCNC: 22 MMOL/L (ref 22–29)
CO2 SERPL-SCNC: 24.6 MMOL/L (ref 22–29)
CREAT SERPL-MCNC: 0.63 MG/DL (ref 0.57–1)
CREAT SERPL-MCNC: 0.71 MG/DL (ref 0.57–1)
CRP SERPL-MCNC: 7.71 MG/DL (ref 0–0.5)
DEPRECATED RDW RBC AUTO: 42.1 FL (ref 37–54)
DEPRECATED RDW RBC AUTO: 43.8 FL (ref 37–54)
EOSINOPHIL # BLD AUTO: 0 10*3/MM3 (ref 0–0.4)
EOSINOPHIL NFR BLD AUTO: 0 % (ref 0.3–6.2)
ERYTHROCYTE [DISTWIDTH] IN BLOOD BY AUTOMATED COUNT: 13.6 % (ref 12.3–15.4)
ERYTHROCYTE [DISTWIDTH] IN BLOOD BY AUTOMATED COUNT: 14.2 % (ref 12.3–15.4)
ERYTHROCYTE [SEDIMENTATION RATE] IN BLOOD: 24 MM/HR (ref 0–20)
FERRITIN SERPL-MCNC: 1392 NG/ML (ref 13–150)
GFR SERPL CREATININE-BSD FRML MDRD: 103 ML/MIN/1.73
GFR SERPL CREATININE-BSD FRML MDRD: 90 ML/MIN/1.73
GLOBULIN UR ELPH-MCNC: 3.4 GM/DL
GLOBULIN UR ELPH-MCNC: 3.5 GM/DL
GLUCOSE SERPL-MCNC: 110 MG/DL (ref 65–99)
GLUCOSE SERPL-MCNC: 83 MG/DL (ref 65–99)
HCT VFR BLD AUTO: 32.1 % (ref 34–46.6)
HCT VFR BLD AUTO: 35.3 % (ref 34–46.6)
HGB BLD-MCNC: 10.8 G/DL (ref 12–15.9)
HGB BLD-MCNC: 11.8 G/DL (ref 12–15.9)
HIV1+2 AB SER QL: NORMAL
IMM GRANULOCYTES # BLD AUTO: 0.06 10*3/MM3 (ref 0–0.05)
IMM GRANULOCYTES NFR BLD AUTO: 1.2 % (ref 0–0.5)
INR PPP: 1.1 (ref 0.85–1.16)
LDH SERPL-CCNC: 623 U/L (ref 135–214)
LDH SERPL-CCNC: 709 U/L (ref 135–214)
LYMPHOCYTES # BLD AUTO: 0.65 10*3/MM3 (ref 0.7–3.1)
LYMPHOCYTES # BLD MANUAL: 0.2 10*3/MM3 (ref 0.7–3.1)
LYMPHOCYTES NFR BLD AUTO: 12.7 % (ref 19.6–45.3)
LYMPHOCYTES NFR BLD MANUAL: 6.2 % (ref 5–12)
LYMPHOCYTES NFR BLD MANUAL: 8.2 % (ref 19.6–45.3)
MCH RBC QN AUTO: 28.8 PG (ref 26.6–33)
MCH RBC QN AUTO: 30.2 PG (ref 26.6–33)
MCHC RBC AUTO-ENTMCNC: 33.4 G/DL (ref 31.5–35.7)
MCHC RBC AUTO-ENTMCNC: 33.6 G/DL (ref 31.5–35.7)
MCV RBC AUTO: 86.1 FL (ref 79–97)
MCV RBC AUTO: 89.7 FL (ref 79–97)
METAMYELOCYTES NFR BLD MANUAL: 1 % (ref 0–0)
MONOCYTES # BLD AUTO: 0.15 10*3/MM3 (ref 0.1–0.9)
MONOCYTES # BLD AUTO: 0.47 10*3/MM3 (ref 0.1–0.9)
MONOCYTES NFR BLD AUTO: 9.2 % (ref 5–12)
NEUTROPHILS # BLD AUTO: 1.99 10*3/MM3 (ref 1.7–7)
NEUTROPHILS NFR BLD AUTO: 3.91 10*3/MM3 (ref 1.7–7)
NEUTROPHILS NFR BLD AUTO: 76.7 % (ref 42.7–76)
NEUTROPHILS NFR BLD MANUAL: 83.5 % (ref 42.7–76)
NRBC BLD AUTO-RTO: 0 /100 WBC (ref 0–0.2)
PLAT MORPH BLD: NORMAL
PLAT MORPH BLD: NORMAL
PLATELET # BLD AUTO: 113 10*3/MM3 (ref 140–450)
PLATELET # BLD AUTO: 145 10*3/MM3 (ref 140–450)
PMV BLD AUTO: 11 FL (ref 6–12)
PMV BLD AUTO: 11.7 FL (ref 6–12)
POTASSIUM SERPL-SCNC: 3.8 MMOL/L (ref 3.5–5.2)
POTASSIUM SERPL-SCNC: 4.1 MMOL/L (ref 3.5–5.2)
PROCALCITONIN SERPL-MCNC: 0.14 NG/ML (ref 0–0.25)
PROCALCITONIN SERPL-MCNC: 0.23 NG/ML (ref 0–0.25)
PROT SERPL-MCNC: 6.4 G/DL (ref 6–8.5)
PROT SERPL-MCNC: 6.6 G/DL (ref 6–8.5)
PROTHROMBIN TIME: 13.9 SECONDS (ref 11.4–14.4)
RBC # BLD AUTO: 3.58 10*6/MM3 (ref 3.77–5.28)
RBC # BLD AUTO: 4.1 10*6/MM3 (ref 3.77–5.28)
RBC MORPH BLD: NORMAL
RBC MORPH BLD: NORMAL
SARS-COV-2 RNA RESP QL NAA+PROBE: NOT DETECTED
SODIUM SERPL-SCNC: 132 MMOL/L (ref 136–145)
SODIUM SERPL-SCNC: 138 MMOL/L (ref 136–145)
TRIGL SERPL-MCNC: 135 MG/DL (ref 0–150)
URATE SERPL-MCNC: 4.4 MG/DL (ref 2.4–5.7)
WBC # BLD AUTO: 2.38 10*3/MM3 (ref 3.4–10.8)
WBC # BLD AUTO: 5.1 10*3/MM3 (ref 3.4–10.8)
WBC MORPH BLD: NORMAL
WBC MORPH BLD: NORMAL

## 2021-07-14 PROCEDURE — 85025 COMPLETE CBC W/AUTO DIFF WBC: CPT | Performed by: INTERNAL MEDICINE

## 2021-07-14 PROCEDURE — 85007 BL SMEAR W/DIFF WBC COUNT: CPT | Performed by: INTERNAL MEDICINE

## 2021-07-14 PROCEDURE — G0432 EIA HIV-1/HIV-2 SCREEN: HCPCS | Performed by: INTERNAL MEDICINE

## 2021-07-14 PROCEDURE — 82728 ASSAY OF FERRITIN: CPT | Performed by: INTERNAL MEDICINE

## 2021-07-14 PROCEDURE — 83615 LACTATE (LD) (LDH) ENZYME: CPT | Performed by: INTERNAL MEDICINE

## 2021-07-14 PROCEDURE — 84478 ASSAY OF TRIGLYCERIDES: CPT | Performed by: INTERNAL MEDICINE

## 2021-07-14 PROCEDURE — 84145 PROCALCITONIN (PCT): CPT | Performed by: INTERNAL MEDICINE

## 2021-07-14 PROCEDURE — U0003 INFECTIOUS AGENT DETECTION BY NUCLEIC ACID (DNA OR RNA); SEVERE ACUTE RESPIRATORY SYNDROME CORONAVIRUS 2 (SARS-COV-2) (CORONAVIRUS DISEASE [COVID-19]), AMPLIFIED PROBE TECHNIQUE, MAKING USE OF HIGH THROUGHPUT TECHNOLOGIES AS DESCRIBED BY CMS-2020-01-R: HCPCS | Performed by: INTERNAL MEDICINE

## 2021-07-14 PROCEDURE — 99223 1ST HOSP IP/OBS HIGH 75: CPT | Performed by: INTERNAL MEDICINE

## 2021-07-14 PROCEDURE — 86645 CMV ANTIBODY IGM: CPT | Performed by: INTERNAL MEDICINE

## 2021-07-14 PROCEDURE — 80053 COMPREHEN METABOLIC PANEL: CPT | Performed by: INTERNAL MEDICINE

## 2021-07-14 PROCEDURE — 86757 RICKETTSIA ANTIBODY: CPT | Performed by: INTERNAL MEDICINE

## 2021-07-14 PROCEDURE — 86664 EPSTEIN-BARR NUCLEAR ANTIGEN: CPT | Performed by: INTERNAL MEDICINE

## 2021-07-14 PROCEDURE — 85610 PROTHROMBIN TIME: CPT | Performed by: INTERNAL MEDICINE

## 2021-07-14 PROCEDURE — 86644 CMV ANTIBODY: CPT | Performed by: INTERNAL MEDICINE

## 2021-07-14 PROCEDURE — 86665 EPSTEIN-BARR CAPSID VCA: CPT | Performed by: INTERNAL MEDICINE

## 2021-07-14 PROCEDURE — 84550 ASSAY OF BLOOD/URIC ACID: CPT | Performed by: INTERNAL MEDICINE

## 2021-07-14 PROCEDURE — 85060 BLOOD SMEAR INTERPRETATION: CPT | Performed by: INTERNAL MEDICINE

## 2021-07-14 PROCEDURE — G0378 HOSPITAL OBSERVATION PER HR: HCPCS

## 2021-07-14 RX ORDER — SODIUM CHLORIDE 0.9 % (FLUSH) 0.9 %
10 SYRINGE (ML) INJECTION EVERY 12 HOURS SCHEDULED
Status: DISCONTINUED | OUTPATIENT
Start: 2021-07-14 | End: 2021-07-18 | Stop reason: HOSPADM

## 2021-07-14 RX ORDER — PROPRANOLOL HYDROCHLORIDE 10 MG/1
10 TABLET ORAL 3 TIMES DAILY PRN
Status: DISCONTINUED | OUTPATIENT
Start: 2021-07-14 | End: 2021-07-18 | Stop reason: HOSPADM

## 2021-07-14 RX ORDER — PROMETHAZINE HYDROCHLORIDE AND CODEINE PHOSPHATE 6.25; 1 MG/5ML; MG/5ML
5 SOLUTION ORAL EVERY 4 HOURS PRN
Status: DISCONTINUED | OUTPATIENT
Start: 2021-07-14 | End: 2021-07-18 | Stop reason: HOSPADM

## 2021-07-14 RX ORDER — SERTRALINE HYDROCHLORIDE 100 MG/1
100 TABLET, FILM COATED ORAL DAILY
Status: DISCONTINUED | OUTPATIENT
Start: 2021-07-15 | End: 2021-07-18 | Stop reason: HOSPADM

## 2021-07-14 RX ORDER — ALBUTEROL SULFATE 2.5 MG/3ML
2.5 SOLUTION RESPIRATORY (INHALATION)
Status: DISCONTINUED | OUTPATIENT
Start: 2021-07-15 | End: 2021-07-15

## 2021-07-14 RX ORDER — AMITRIPTYLINE HYDROCHLORIDE 25 MG/1
25 TABLET, FILM COATED ORAL NIGHTLY PRN
Status: DISCONTINUED | OUTPATIENT
Start: 2021-07-14 | End: 2021-07-15

## 2021-07-14 RX ORDER — PREDNISONE 20 MG/1
20 TABLET ORAL
Status: DISCONTINUED | OUTPATIENT
Start: 2021-07-20 | End: 2021-07-17

## 2021-07-14 RX ORDER — PREDNISONE 20 MG/1
40 TABLET ORAL
Status: DISCONTINUED | OUTPATIENT
Start: 2021-07-15 | End: 2021-07-17

## 2021-07-14 RX ORDER — ALBUTEROL SULFATE 2.5 MG/3ML
2.5 SOLUTION RESPIRATORY (INHALATION) EVERY 4 HOURS PRN
Status: DISCONTINUED | OUTPATIENT
Start: 2021-07-14 | End: 2021-07-15

## 2021-07-14 RX ORDER — CETIRIZINE HYDROCHLORIDE 10 MG/1
10 TABLET ORAL DAILY
Status: DISCONTINUED | OUTPATIENT
Start: 2021-07-15 | End: 2021-07-18 | Stop reason: HOSPADM

## 2021-07-14 RX ORDER — PREDNISONE 20 MG/1
20 TABLET ORAL
Status: DISCONTINUED | OUTPATIENT
Start: 2021-07-15 | End: 2021-07-14

## 2021-07-14 RX ORDER — SODIUM CHLORIDE 0.9 % (FLUSH) 0.9 %
10 SYRINGE (ML) INJECTION AS NEEDED
Status: DISCONTINUED | OUTPATIENT
Start: 2021-07-14 | End: 2021-07-18 | Stop reason: HOSPADM

## 2021-07-14 RX ORDER — PANTOPRAZOLE SODIUM 40 MG/1
40 TABLET, DELAYED RELEASE ORAL
Refills: 5 | Status: DISCONTINUED | OUTPATIENT
Start: 2021-07-15 | End: 2021-07-18 | Stop reason: HOSPADM

## 2021-07-14 RX ORDER — DOXYCYCLINE 100 MG/1
100 CAPSULE ORAL 2 TIMES DAILY
Status: COMPLETED | OUTPATIENT
Start: 2021-07-14 | End: 2021-07-17

## 2021-07-14 RX ORDER — FLUTICASONE PROPIONATE 50 MCG
1 SPRAY, SUSPENSION (ML) NASAL DAILY
Status: DISCONTINUED | OUTPATIENT
Start: 2021-07-15 | End: 2021-07-18 | Stop reason: HOSPADM

## 2021-07-14 RX ORDER — IBUPROFEN 200 MG
400 TABLET ORAL EVERY 6 HOURS PRN
COMMUNITY
End: 2021-07-30 | Stop reason: HOSPADM

## 2021-07-14 RX ADMIN — AMITRIPTYLINE HYDROCHLORIDE 25 MG: 25 TABLET, FILM COATED ORAL at 22:58

## 2021-07-14 RX ADMIN — DOXYCYCLINE 100 MG: 100 CAPSULE ORAL at 22:58

## 2021-07-14 NOTE — PROGRESS NOTES
Met patient in lung nodule clinic with Dr. Claudio 07/13/21. Patient developed non-productive cough, fevers, sweats and fatigue x4wks ago. She is currently on her second round of ABX without improvement in symptoms. Reports fevers spiking to 103.6 less than one week ago. Reports fever typically in the 101-102 range waxing and waning. She was diagnosed with COVID-19 in early January but reportedly had mild symptoms x4days with fatigue resolving by February. She has been tested for COVID-19 again with presentation of symptoms and negative results. Scans reviewed in clinic. Per Dr. Claudio obtain labs today, start steroid and repeat CXR with f/u x2wks. However, when labs returned Dr. Claudio wanted patient to be seen by hematology due to leukopenia and fever of unknown origin. After coordinating between hematology and pulmonary decision was made for patient to be directly admitted to hospital today. Called patient to notify her of findings and plan as well as check status since visit yesterday. She was agreeable to admission and will have boyfriend bring her to hospital. Nurse navigator role introduced and provided contact information. She knows to call with questions or concerns.

## 2021-07-15 ENCOUNTER — APPOINTMENT (OUTPATIENT)
Dept: CT IMAGING | Facility: HOSPITAL | Age: 46
End: 2021-07-15

## 2021-07-15 LAB
ALBUMIN SERPL-MCNC: 2.6 G/DL (ref 3.5–5.2)
ALBUMIN/GLOB SERPL: 0.9 G/DL
ALP SERPL-CCNC: 130 U/L (ref 39–117)
ALT SERPL W P-5'-P-CCNC: 31 U/L (ref 1–33)
ANION GAP SERPL CALCULATED.3IONS-SCNC: 8 MMOL/L (ref 5–15)
AST SERPL-CCNC: 40 U/L (ref 1–32)
BASOPHILS # BLD AUTO: 0 10*3/MM3 (ref 0–0.2)
BASOPHILS NFR BLD AUTO: 0 % (ref 0–1.5)
BILIRUB SERPL-MCNC: 0.4 MG/DL (ref 0–1.2)
BUN SERPL-MCNC: 10 MG/DL (ref 6–20)
BUN/CREAT SERPL: 18.9 (ref 7–25)
CALCIUM SPEC-SCNC: 8.2 MG/DL (ref 8.6–10.5)
CHLORIDE SERPL-SCNC: 100 MMOL/L (ref 98–107)
CO2 SERPL-SCNC: 23 MMOL/L (ref 22–29)
CREAT SERPL-MCNC: 0.53 MG/DL (ref 0.57–1)
CYTOLOGIST CVX/VAG CYTO: NORMAL
D DIMER PPP FEU-MCNC: 4.51 MCGFEU/ML (ref 0–0.56)
DEPRECATED RDW RBC AUTO: 43.6 FL (ref 37–54)
EOSINOPHIL # BLD AUTO: 0 10*3/MM3 (ref 0–0.4)
EOSINOPHIL NFR BLD AUTO: 0 % (ref 0.3–6.2)
ERYTHROCYTE [DISTWIDTH] IN BLOOD BY AUTOMATED COUNT: 14.1 % (ref 12.3–15.4)
GFR SERPL CREATININE-BSD FRML MDRD: 126 ML/MIN/1.73
GLOBULIN UR ELPH-MCNC: 3 GM/DL
GLUCOSE SERPL-MCNC: 102 MG/DL (ref 65–99)
HAPTOGLOB SERPL-MCNC: 42 MG/DL (ref 30–200)
HAPTOGLOB SERPL-MCNC: <10 MG/DL (ref 30–200)
HCT VFR BLD AUTO: 28.8 % (ref 34–46.6)
HGB BLD-MCNC: 9.5 G/DL (ref 12–15.9)
IMM GRANULOCYTES # BLD AUTO: 0.04 10*3/MM3 (ref 0–0.05)
IMM GRANULOCYTES NFR BLD AUTO: 1 % (ref 0–0.5)
IRON 24H UR-MRATE: 45 MCG/DL (ref 37–145)
IRON SATN MFR SERPL: 16 % (ref 20–50)
LYMPHOCYTES # BLD AUTO: 0.54 10*3/MM3 (ref 0.7–3.1)
LYMPHOCYTES NFR BLD AUTO: 14 % (ref 19.6–45.3)
MCH RBC QN AUTO: 28.6 PG (ref 26.6–33)
MCHC RBC AUTO-ENTMCNC: 33 G/DL (ref 31.5–35.7)
MCV RBC AUTO: 86.7 FL (ref 79–97)
MONOCYTES # BLD AUTO: 0.42 10*3/MM3 (ref 0.1–0.9)
MONOCYTES NFR BLD AUTO: 10.9 % (ref 5–12)
NEUTROPHILS NFR BLD AUTO: 2.87 10*3/MM3 (ref 1.7–7)
NEUTROPHILS NFR BLD AUTO: 74.1 % (ref 42.7–76)
NRBC BLD AUTO-RTO: 0 /100 WBC (ref 0–0.2)
PATH INTERP BLD-IMP: NORMAL
PLAT MORPH BLD: NORMAL
PLATELET # BLD AUTO: 141 10*3/MM3 (ref 140–450)
PMV BLD AUTO: 11 FL (ref 6–12)
POTASSIUM SERPL-SCNC: 3.7 MMOL/L (ref 3.5–5.2)
PROT SERPL-MCNC: 5.6 G/DL (ref 6–8.5)
RBC # BLD AUTO: 3.32 10*6/MM3 (ref 3.77–5.28)
RBC MORPH BLD: NORMAL
SODIUM SERPL-SCNC: 131 MMOL/L (ref 136–145)
TIBC SERPL-MCNC: 279 MCG/DL (ref 298–536)
TRANSFERRIN SERPL-MCNC: 187 MG/DL (ref 200–360)
WBC # BLD AUTO: 3.87 10*3/MM3 (ref 3.4–10.8)
WBC MORPH BLD: NORMAL

## 2021-07-15 PROCEDURE — 85025 COMPLETE CBC W/AUTO DIFF WBC: CPT | Performed by: NURSE PRACTITIONER

## 2021-07-15 PROCEDURE — 88237 TISSUE CULTURE BONE MARROW: CPT

## 2021-07-15 PROCEDURE — 88305 TISSUE EXAM BY PATHOLOGIST: CPT | Performed by: HOSPITALIST

## 2021-07-15 PROCEDURE — 83540 ASSAY OF IRON: CPT | Performed by: INTERNAL MEDICINE

## 2021-07-15 PROCEDURE — 94799 UNLISTED PULMONARY SVC/PX: CPT

## 2021-07-15 PROCEDURE — 88184 FLOWCYTOMETRY/ TC 1 MARKER: CPT

## 2021-07-15 PROCEDURE — 63710000001 PREDNISONE PER 1 MG: Performed by: INTERNAL MEDICINE

## 2021-07-15 PROCEDURE — 87206 SMEAR FLUORESCENT/ACID STAI: CPT | Performed by: HOSPITALIST

## 2021-07-15 PROCEDURE — 99153 MOD SED SAME PHYS/QHP EA: CPT

## 2021-07-15 PROCEDURE — 88264 CHROMOSOME ANALYSIS 20-25: CPT

## 2021-07-15 PROCEDURE — 88313 SPECIAL STAINS GROUP 2: CPT | Performed by: HOSPITALIST

## 2021-07-15 PROCEDURE — 99152 MOD SED SAME PHYS/QHP 5/>YRS: CPT | Performed by: RADIOLOGY

## 2021-07-15 PROCEDURE — 25010000002 FENTANYL CITRATE (PF) 50 MCG/ML SOLUTION: Performed by: RADIOLOGY

## 2021-07-15 PROCEDURE — 85097 BONE MARROW INTERPRETATION: CPT | Performed by: HOSPITALIST

## 2021-07-15 PROCEDURE — 74178 CT ABD&PLV WO CNTR FLWD CNTR: CPT

## 2021-07-15 PROCEDURE — 88311 DECALCIFY TISSUE: CPT | Performed by: HOSPITALIST

## 2021-07-15 PROCEDURE — G0378 HOSPITAL OBSERVATION PER HR: HCPCS

## 2021-07-15 PROCEDURE — 99244 OFF/OP CNSLTJ NEW/EST MOD 40: CPT | Performed by: INTERNAL MEDICINE

## 2021-07-15 PROCEDURE — 77012 CT SCAN FOR NEEDLE BIOPSY: CPT

## 2021-07-15 PROCEDURE — 77012 CT SCAN FOR NEEDLE BIOPSY: CPT | Performed by: RADIOLOGY

## 2021-07-15 PROCEDURE — 94640 AIRWAY INHALATION TREATMENT: CPT

## 2021-07-15 PROCEDURE — 85007 BL SMEAR W/DIFF WBC COUNT: CPT | Performed by: NURSE PRACTITIONER

## 2021-07-15 PROCEDURE — 25010000002 IOPAMIDOL 61 % SOLUTION: Performed by: HOSPITALIST

## 2021-07-15 PROCEDURE — 85379 FIBRIN DEGRADATION QUANT: CPT | Performed by: INTERNAL MEDICINE

## 2021-07-15 PROCEDURE — 38222 DX BONE MARROW BX & ASPIR: CPT | Performed by: RADIOLOGY

## 2021-07-15 PROCEDURE — 88185 FLOWCYTOMETRY/TC ADD-ON: CPT

## 2021-07-15 PROCEDURE — 87015 SPECIMEN INFECT AGNT CONCNTJ: CPT | Performed by: HOSPITALIST

## 2021-07-15 PROCEDURE — 87102 FUNGUS ISOLATION CULTURE: CPT | Performed by: HOSPITALIST

## 2021-07-15 PROCEDURE — 80053 COMPREHEN METABOLIC PANEL: CPT | Performed by: NURSE PRACTITIONER

## 2021-07-15 PROCEDURE — 07DR3ZX EXTRACTION OF ILIAC BONE MARROW, PERCUTANEOUS APPROACH, DIAGNOSTIC: ICD-10-PCS | Performed by: PATHOLOGY

## 2021-07-15 PROCEDURE — 25010000002 MIDAZOLAM PER 1 MG: Performed by: RADIOLOGY

## 2021-07-15 PROCEDURE — 86666 EHRLICHIA ANTIBODY: CPT | Performed by: INTERNAL MEDICINE

## 2021-07-15 PROCEDURE — 84466 ASSAY OF TRANSFERRIN: CPT | Performed by: INTERNAL MEDICINE

## 2021-07-15 PROCEDURE — 99233 SBSQ HOSP IP/OBS HIGH 50: CPT | Performed by: HOSPITALIST

## 2021-07-15 PROCEDURE — 87205 SMEAR GRAM STAIN: CPT | Performed by: HOSPITALIST

## 2021-07-15 PROCEDURE — 99152 MOD SED SAME PHYS/QHP 5/>YRS: CPT

## 2021-07-15 PROCEDURE — 83010 ASSAY OF HAPTOGLOBIN QUANT: CPT | Performed by: INTERNAL MEDICINE

## 2021-07-15 PROCEDURE — 87116 MYCOBACTERIA CULTURE: CPT | Performed by: HOSPITALIST

## 2021-07-15 PROCEDURE — 87070 CULTURE OTHR SPECIMN AEROBIC: CPT | Performed by: HOSPITALIST

## 2021-07-15 PROCEDURE — 88312 SPECIAL STAINS GROUP 1: CPT | Performed by: HOSPITALIST

## 2021-07-15 PROCEDURE — 85397 CLOTTING FUNCT ACTIVITY: CPT | Performed by: INTERNAL MEDICINE

## 2021-07-15 RX ORDER — MIDAZOLAM HYDROCHLORIDE 1 MG/ML
INJECTION INTRAMUSCULAR; INTRAVENOUS
Status: COMPLETED | OUTPATIENT
Start: 2021-07-15 | End: 2021-07-15

## 2021-07-15 RX ORDER — ALBUTEROL SULFATE 90 UG/1
2 AEROSOL, METERED RESPIRATORY (INHALATION) EVERY 4 HOURS PRN
Status: DISCONTINUED | OUTPATIENT
Start: 2021-07-15 | End: 2021-07-18 | Stop reason: HOSPADM

## 2021-07-15 RX ORDER — FENTANYL CITRATE 50 UG/ML
INJECTION, SOLUTION INTRAMUSCULAR; INTRAVENOUS
Status: COMPLETED | OUTPATIENT
Start: 2021-07-15 | End: 2021-07-15

## 2021-07-15 RX ORDER — LIDOCAINE HYDROCHLORIDE 10 MG/ML
10 INJECTION, SOLUTION EPIDURAL; INFILTRATION; INTRACAUDAL; PERINEURAL ONCE
Status: DISCONTINUED | OUTPATIENT
Start: 2021-07-15 | End: 2021-07-18 | Stop reason: HOSPADM

## 2021-07-15 RX ORDER — FENTANYL CITRATE 50 UG/ML
INJECTION, SOLUTION INTRAMUSCULAR; INTRAVENOUS
Status: DISPENSED
Start: 2021-07-15 | End: 2021-07-15

## 2021-07-15 RX ORDER — ALBUTEROL SULFATE 90 UG/1
2 AEROSOL, METERED RESPIRATORY (INHALATION)
Status: DISCONTINUED | OUTPATIENT
Start: 2021-07-15 | End: 2021-07-17

## 2021-07-15 RX ORDER — AMITRIPTYLINE HYDROCHLORIDE 50 MG/1
50 TABLET, FILM COATED ORAL NIGHTLY PRN
Status: DISCONTINUED | OUTPATIENT
Start: 2021-07-15 | End: 2021-07-18 | Stop reason: HOSPADM

## 2021-07-15 RX ORDER — MIDAZOLAM HYDROCHLORIDE 1 MG/ML
INJECTION INTRAMUSCULAR; INTRAVENOUS
Status: DISPENSED
Start: 2021-07-15 | End: 2021-07-15

## 2021-07-15 RX ADMIN — AMITRIPTYLINE HYDROCHLORIDE 50 MG: 50 TABLET, FILM COATED ORAL at 20:49

## 2021-07-15 RX ADMIN — ALBUTEROL SULFATE 2 PUFF: 108 AEROSOL, METERED RESPIRATORY (INHALATION) at 20:25

## 2021-07-15 RX ADMIN — SERTRALINE HYDROCHLORIDE 100 MG: 100 TABLET ORAL at 08:12

## 2021-07-15 RX ADMIN — PANTOPRAZOLE SODIUM 40 MG: 40 TABLET, DELAYED RELEASE ORAL at 08:12

## 2021-07-15 RX ADMIN — DOXYCYCLINE 100 MG: 100 CAPSULE ORAL at 08:12

## 2021-07-15 RX ADMIN — ALBUTEROL SULFATE 2 PUFF: 108 AEROSOL, METERED RESPIRATORY (INHALATION) at 12:29

## 2021-07-15 RX ADMIN — SODIUM CHLORIDE, PRESERVATIVE FREE 10 ML: 5 INJECTION INTRAVENOUS at 08:15

## 2021-07-15 RX ADMIN — MIDAZOLAM HYDROCHLORIDE 1 MG: 1 INJECTION, SOLUTION INTRAMUSCULAR; INTRAVENOUS at 09:05

## 2021-07-15 RX ADMIN — SODIUM CHLORIDE, PRESERVATIVE FREE 10 ML: 5 INJECTION INTRAVENOUS at 20:50

## 2021-07-15 RX ADMIN — CETIRIZINE HYDROCHLORIDE 10 MG: 10 TABLET, FILM COATED ORAL at 08:12

## 2021-07-15 RX ADMIN — IOPAMIDOL 100 ML: 612 INJECTION, SOLUTION INTRAVENOUS at 09:50

## 2021-07-15 RX ADMIN — FENTANYL CITRATE 50 MCG: 50 INJECTION, SOLUTION INTRAMUSCULAR; INTRAVENOUS at 09:19

## 2021-07-15 RX ADMIN — ALBUTEROL SULFATE 2 PUFF: 108 AEROSOL, METERED RESPIRATORY (INHALATION) at 08:02

## 2021-07-15 RX ADMIN — FENTANYL CITRATE 50 MCG: 50 INJECTION, SOLUTION INTRAMUSCULAR; INTRAVENOUS at 09:05

## 2021-07-15 RX ADMIN — PREDNISONE 40 MG: 20 TABLET ORAL at 08:12

## 2021-07-15 RX ADMIN — DOXYCYCLINE 100 MG: 100 CAPSULE ORAL at 20:49

## 2021-07-15 RX ADMIN — FLUTICASONE PROPIONATE 1 SPRAY: 50 SPRAY, METERED NASAL at 08:12

## 2021-07-15 NOTE — PLAN OF CARE
VSS. Pt on 2L NC. Bone marrow biopsy and liver ultrasound done today. Pt denies any complaints of pain. Pt has been afebrile during this shift. Will continue to monitor.

## 2021-07-15 NOTE — PLAN OF CARE
Problem: Adult Inpatient Plan of Care  Goal: Plan of Care Review  Outcome: Ongoing, Progressing  >: VSS. Alert and oriented x 4. Denies pain. Oxygen intact at 2L/minute via nasal cannula (RA at baseline). Up to date on POC. NPO as of MN. No acute changes over the course of this shift. Otherwise, UAL within room. Belongings at call light at the bedside and within reach. Continuing to monitor.    Ginny Heard RN  05:08 EDT  07/15/21

## 2021-07-15 NOTE — CONSULTS
HEMATOLOGY/ONCOLOGY INPATIENT CONSULT    REASON FOR CONSULT: Splenomegaly    Subjective   HISTORY OF PRESENT ILLNESS; I am asked to see this 45 y.o.  female, referred for transient thrombocytopenia and anemia with splenomegaly.  She has recent evaluation by Dr. Claudio for temperature up to 103 treated with azithromycin with cough and small pulmonary nodules currently with no plans for bronchoscopy.  When counts were found to be low and spleen to be 16 cm she was admitted.  AST 57 and ALT 71 on 7/1/2021 down to AST of 40 as of 7/15/2021.  Bilirubin 2.6.  Creatinine normal.  Platelets were at their lowest on 7/7/2021 and 94,000 now 141,000.  Hemoglobin has crept down from 12.5 down to 9.5 as of today over the last week.      Past Medical History:   Diagnosis Date   • GERD (gastroesophageal reflux disease)    • Headache    • Hip pain    • Low back pain    • Splenomegaly 7/13/2021     Past Surgical History:   Procedure Laterality Date   • ANKLE SURGERY     • APPENDECTOMY     • LASIK     • SINUS SURGERY         No current facility-administered medications on file prior to encounter.     Current Outpatient Medications on File Prior to Encounter   Medication Sig Dispense Refill   • amitriptyline (ELAVIL) 25 MG tablet TAKE 1-2 TABLETS BY MOUTH 30 MINUTES BEFORE BEDTIME. 45 tablet 1   • cetirizine (zyrTEC) 10 MG tablet Take 10 mg by mouth Daily.     • [employer] Fluticasone Propionate 50 MCG/ACT nasal spray 1 spray by Each Nare route Daily.     • doxycycline (MONODOX) 100 MG capsule Take 1 capsule by mouth 2 (Two) Times a Day for 10 days. 20 capsule 0   • ibuprofen (ADVIL,MOTRIN) 200 MG tablet Take 400 mg by mouth Every 6 (Six) Hours As Needed for Mild Pain .     • omeprazole (PrilOSEC) 20 MG capsule Take 1 capsule by mouth Daily. 30 capsule 5   • predniSONE (DELTASONE) 20 MG tablet 2 tabs x 1 week, then 1 tab x 1 week, then stop. 21 tablet 1   • promethazine-codeine (PHENERGAN with CODEINE) 6.25-10 MG/5ML solution Take 5 mL by  "mouth Every 4 (Four) Hours As Needed for Cough. 240 mL 0   • propranolol (INDERAL) 20 MG tablet Take 1/2 to 1 tablet by mouth 3 times a day as needed for anxiety. 60 tablet 5   • sertraline (Zoloft) 100 MG tablet Take 1 tablet by mouth Daily. 90 tablet 1   • vitamin D3 125 MCG (5000 UT) capsule capsule Take 2,000 Units by mouth Daily.     • albuterol (PROVENTIL) (2.5 MG/3ML) 0.083% nebulizer solution Take 2.5 mg by nebulization Every 4 (Four) Hours As Needed for Wheezing. 90 mL 5   • QUEtiapine (SEROquel) 25 MG tablet TAKE 1 2 TABLETS 30 MINUTES BEFORE BEDTIME AS NEEDED FOR SLEEP.         Allergies   Allergen Reactions   • Eggs Or Egg-Derived Products    • Sudafed [Pseudoephedrine]        Social History     Socioeconomic History   • Marital status: Single     Spouse name: Not on file   • Number of children: Not on file   • Years of education: Not on file   • Highest education level: Not on file   Tobacco Use   • Smoking status: Former Smoker     Packs/day: 0.50     Years: 2.00     Pack years: 1.00     Types: Cigarettes     Quit date:      Years since quittin.5   • Smokeless tobacco: Never Used   Substance and Sexual Activity   • Alcohol use: Yes     Comment: 1-2 beers daily with dinner    • Drug use: Never   • Sexual activity: Defer       Family History   Problem Relation Age of Onset   • Hypertension Mother    • Depression Mother    • Diabetes Father    • Parkinsonism Maternal Grandmother    • Cancer Paternal Grandmother          REVIEW OF SYSTEMS:  A 14 point review of systems was performed and is negative except as noted above.    Objective   PHYSICAL EXAM:    /83 (BP Location: Right arm, Patient Position: Lying)   Pulse 74   Temp 97.9 °F (36.6 °C) (Oral)   Resp 20   Ht 165.1 cm (65\")   Wt 78.6 kg (173 lb 4.8 oz)   LMP  (LMP Unknown)   SpO2 96%   Breastfeeding No   BMI 28.84 kg/m²               ECOG: (2) Ambulatory & Capable of Self Care, Unable to Carry Out Work Activity, Up & About " Greater Than 50% of Waking Hours  General: well appearing female in no acute distress  HEENT: sclerae anicteric, oropharynx clear  Lymphatics: no cervical, supraclavicular, inguinal, or axillary adenopathy  Neck: Supple. No thyromegaly.  Cardiovascular: regular rate and rhythm, no murmurs  Lungs: clear to auscultation bilaterally. No respiratory distress  Abdomen: soft, nontender, nondistended.  No palpable organomegaly  Extremities: no lower extremity edema, cyanosis, or clubbing  Skin: no rashes, lesions, bruising, or petechiae  Neuro: Alert and oriented x3. Moves all extremities.    Results:    Results from last 7 days   Lab Units 07/15/21  0148 07/14/21 1807 07/13/21  1602   WBC 10*3/mm3 3.87 5.10 2.38*   HEMOGLOBIN g/dL 9.5* 10.8* 11.8*   PLATELETS 10*3/mm3 141 145 113*     Results from last 7 days   Lab Units 07/15/21  0148 07/14/21 1807 07/13/21  1602   SODIUM mmol/L 131* 138 132*   POTASSIUM mmol/L 3.7 4.1 3.8   CO2 mmol/L 23.0 22.0 24.6   BUN mg/dL 10 10 8   CREATININE mg/dL 0.53* 0.63 0.71   GLUCOSE mg/dL 102* 110* 83     Results from last 7 days   Lab Units 07/15/21  0148 07/14/21 1807 07/13/21  1602   AST (SGOT) U/L 40* 49* 62*   ALT (SGPT) U/L 31 38* 48*   BILIRUBIN mg/dL 0.4 0.4 0.4   ALK PHOS U/L 130* 158* 153*         CT Abdomen Pelvis With & Without Contrast    Result Date: 7/15/2021  Normal multiphase contrast-enhanced appearance of the liver without evidence of suspicious focal lesion, heterogeneous enhancement or other parenchymal abnormality to suggest Budd-Chiari syndrome.  Enlarged but otherwise homogeneous spleen measuring up to 15 cm.  No acute findings in the abdomen and pelvis otherwise.   This report was finalized on 7/15/2021 10:54 AM by Manpreet Milton.      US Abdomen Complete    Result Date: 7/9/2021  1. Increased echogenicity throughout the hepatic parenchyma consistent with parenchymal disease process such as hepatic steatosis. No focal liver lesion.  2. Splenomegaly measuring  up to 16.2 cm concerning for developing portal hypertension in the setting of hepatic parenchymal process.  3. No ascites.  D:  07/09/2021 E:  07/09/2021     This report was finalized on 7/9/2021 4:44 PM by Dr. Drew Valencia.        Assessment    ASSESSMENT & PLAN:  1.  Splenomegaly  2.  FUO  3.  Elevated liver enzymes  4.  Mild anemia  5.  Transient thrombocytopenia resolved    Discussion: While lymphoma is on the differential, she has no palpable or other visible adenopathy and isolated thrombocytopenia can be due to splenic lymphoma but at her age that would be less likely.  Bone marrow biopsy performed and I ordered flow cytometry and lymphoproliferative FISH panel.  CT scan three-phase contrast did not show evidence of Budd-Chiari.  She may have fatty liver infiltration.  Unfortunately prior ultrasound done in the past few days was done here in the hospital where they do not do Doppler on abdomen ultrasound so I would not wait until outpatient liver spleen ultrasound with Noel Gabby at [facility] but would expect to have seen some evidence of portal hypertensive changes on the three-phase contrast CT of the abdomen.  If bone marrow biopsy is negative for lymphoproliferative disorder, I think this is more likely to be due to post viral inflammatory syndrome versus sarcoid versus Felty's from autoimmune disease as yet typified.  I will follow up on her bone marrow results in the days ahead.  Nothing else to add in the near term.    Total time of care inclusive of time spent today reviewing past medical records and going over her complex lengthy differential of splenomegaly with the patient and outlining the plan as outlined above took 1 hour total patient care time throughout the day today inclusive of time spent discussing with hospitalist earlier this morning.        Marito Gibbs MD    7/15/2021

## 2021-07-15 NOTE — CONSULTS
HEMATOLOGY/ONCOLOGY INPATIENT CONSULT    REASON FOR CONSULT: Pancytopenia    Subjective   HISTORY OF PRESENT ILLNESS;  Ms. Copeland is a 45-year-old lady with past medical history of GERD, insomnia, and anxiety who presents to [facility] with persistent fever and shortness of breath.  She states for the past month she has been having fevers daily up to 103.  She has noted a 10 pound weight loss during this time as well as nightly night sweats where she has to change her bed sheets her pajamas.  She had a measure oxygen saturation of 88% on room air.  She has been seen by her PCP who has treated her with a course of azithromycin, steroids, cough medicine with no improvement of her symptoms.  She was referred to pulmonary where she had a CT scan of her chest which showed changes concerning of possible viral pneumonia as well as a 5 mm nodule.  She was also noted to have splenomegaly on imaging.  She denies any tick bites, but states that she was recently in the [place] prior to having her symptoms.  Thus far, labs and imaging have been consistent with splenomegaly as well as elevated inflammatory markers including D-dimer, ferritin, CRP, ESR.  She has a mild elevation in her LFTs as well as anemia with leukopenia and mild thrombocytopenia.  Today she is resting comfortably in bed.  Denies any worsening fatigue.  Does note that she become short of breath when she takes her oxygen off to try and get to the bathroom.  She has been afebrile since her admission      Past Medical History:   Diagnosis Date   • GERD (gastroesophageal reflux disease)    • Headache    • Hip pain    • Low back pain    • Splenomegaly 7/13/2021     Past Surgical History:   Procedure Laterality Date   • ANKLE SURGERY     • APPENDECTOMY     • LASIK     • SINUS SURGERY         No current facility-administered medications on file prior to encounter.     Current Outpatient Medications on File Prior to Encounter   Medication Sig Dispense  Refill   • amitriptyline (ELAVIL) 25 MG tablet TAKE 1-2 TABLETS BY MOUTH 30 MINUTES BEFORE BEDTIME. 45 tablet 1   • cetirizine (zyrTEC) 10 MG tablet Take 10 mg by mouth Daily.     • [facility] Fluticasone Propionate 50 MCG/ACT nasal spray 1 spray by Each Nare route Daily.     • doxycycline (MONODOX) 100 MG capsule Take 1 capsule by mouth 2 (Two) Times a Day for 10 days. 20 capsule 0   • ibuprofen (ADVIL,MOTRIN) 200 MG tablet Take 400 mg by mouth Every 6 (Six) Hours As Needed for Mild Pain .     • omeprazole (PrilOSEC) 20 MG capsule Take 1 capsule by mouth Daily. 30 capsule 5   • predniSONE (DELTASONE) 20 MG tablet 2 tabs x 1 week, then 1 tab x 1 week, then stop. 21 tablet 1   • promethazine-codeine (PHENERGAN with CODEINE) 6.25-10 MG/5ML solution Take 5 mL by mouth Every 4 (Four) Hours As Needed for Cough. 240 mL 0   • propranolol (INDERAL) 20 MG tablet Take 1/2 to 1 tablet by mouth 3 times a day as needed for anxiety. 60 tablet 5   • sertraline (Zoloft) 100 MG tablet Take 1 tablet by mouth Daily. 90 tablet 1   • vitamin D3 125 MCG (5000 UT) capsule capsule Take 2,000 Units by mouth Daily.     • albuterol (PROVENTIL) (2.5 MG/3ML) 0.083% nebulizer solution Take 2.5 mg by nebulization Every 4 (Four) Hours As Needed for Wheezing. 90 mL 5   • QUEtiapine (SEROquel) 25 MG tablet TAKE 1 2 TABLETS 30 MINUTES BEFORE BEDTIME AS NEEDED FOR SLEEP.         Allergies   Allergen Reactions   • Eggs Or Egg-Derived Products    • Sudafed [Pseudoephedrine]        Social History     Socioeconomic History   • Marital status: Single     Spouse name: Not on file   • Number of children: Not on file   • Years of education: Not on file   • Highest education level: Not on file   Tobacco Use   • Smoking status: Former Smoker     Packs/day: 0.50     Years: 2.00     Pack years: 1.00     Types: Cigarettes     Quit date:      Years since quittin.5   • Smokeless tobacco: Never Used   Substance and Sexual Activity   • Alcohol use: Yes     Comment:  "1-2 beers daily with dinner    • Drug use: Never   • Sexual activity: Defer       Family History   Problem Relation Age of Onset   • Hypertension Mother    • Depression Mother    • Diabetes Father    • Parkinsonism Maternal Grandmother    • Cancer Paternal Grandmother          REVIEW OF SYSTEMS:  A 12 point review of systems was performed and is negative except as noted above.    Objective   PHYSICAL EXAM:    /83 (BP Location: Right arm, Patient Position: Lying)   Pulse 74   Temp 97.9 °F (36.6 °C) (Oral)   Resp 20   Ht 165.1 cm (65\")   Wt 78.6 kg (173 lb 4.8 oz)   LMP  (LMP Unknown)   SpO2 96%   Breastfeeding No   BMI 28.84 kg/m²     ECOG: (0) Fully Active - Able to Carry On All Pre-disease Performance Without Restriction  General: well appearing female in no acute distress  HEENT: sclerae anicteric, oropharynx clear  Lymphatics: no cervical, supraclavicular, inguinal, or axillary adenopathy  Neck: Supple. No thyromegaly.  Cardiovascular: regular rate and rhythm, no murmurs  Lungs: clear to auscultation bilaterally. No respiratory distress  Abdomen: soft, nontender, nondistended.  No palpable organomegaly  Extremities: no lower extremity edema, cyanosis, or clubbing  Skin: no rashes, lesions, bruising, or petechiae  Neuro: Alert and oriented x3. Moves all extremities.    Results:    Results from last 7 days   Lab Units 07/15/21  0148 07/14/21  1807 07/13/21  1602   WBC 10*3/mm3 3.87 5.10 2.38*   HEMOGLOBIN g/dL 9.5* 10.8* 11.8*   PLATELETS 10*3/mm3 141 145 113*     Results from last 7 days   Lab Units 07/15/21  0148 07/14/21  1807 07/13/21  1602   SODIUM mmol/L 131* 138 132*   POTASSIUM mmol/L 3.7 4.1 3.8   CO2 mmol/L 23.0 22.0 24.6   BUN mg/dL 10 10 8   CREATININE mg/dL 0.53* 0.63 0.71   GLUCOSE mg/dL 102* 110* 83     Results from last 7 days   Lab Units 07/15/21  0148 07/14/21  1807 07/13/21  1602   AST (SGOT) U/L 40* 49* 62*   ALT (SGPT) U/L 31 38* 48*   BILIRUBIN mg/dL 0.4 0.4 0.4   ALK PHOS U/L " 130* 158* 153*         CT Abdomen Pelvis With & Without Contrast    Result Date: 7/15/2021  Normal multiphase contrast-enhanced appearance of the liver without evidence of suspicious focal lesion, heterogeneous enhancement or other parenchymal abnormality to suggest Budd-Chiari syndrome.  Enlarged but otherwise homogeneous spleen measuring up to 15 cm.  No acute findings in the abdomen and pelvis otherwise.   This report was finalized on 7/15/2021 10:54 AM by Manpreet Milton.      US Abdomen Complete    Result Date: 7/9/2021  1. Increased echogenicity throughout the hepatic parenchyma consistent with parenchymal disease process such as hepatic steatosis. No focal liver lesion.  2. Splenomegaly measuring up to 16.2 cm concerning for developing portal hypertension in the setting of hepatic parenchymal process.  3. No ascites.  D:  07/09/2021 E:  07/09/2021     This report was finalized on 7/9/2021 4:44 PM by Dr. Drew Valencia.        Assessment    ASSESSMENT & PLAN:  Ms. Copeland is a 45-year-old lady with past medical history of GERD, insomnia, and anxiety who presents to [facility] with persistent fever and shortness of breath.    Pancytopenia  Transaminitis  Fever of unknown origin  Acute hypoxic respiratory failure  Splenomegaly  -On presentation patient's white count 2.38 hemoglobin 11.8 and a platelet count of 113K  -WBC ranging 3.8 and 5.1 during the admission.  Differential predominantly neutrophilic with decreased lymphocytes  -Peripheral smear showing no overt immature cells, blasts, schistocytes  -Uncertain cause of anemia at this time.  Most recent hemoglobin 9.5, likely trending down secondary to blood loss from labs versus possible hemolysis given an elevated LDH and undetectable haptoglobin  -Given her persistent fevers will check an ADAMTS 13 to rule out TTP  -Platelet count stable at 141K  -Elevated ferritin to 1400, triglycerides normal, mild splenomegaly with other increased inflammatory  markers as well as fever of unknown origin could be concerning for HLH.  -Bone marrow biopsy completed today  -We will rule out tickborne diseases given her leukopenia, fevers, elevated LFTs  -HIV, hepatitis, HSV, Lyme antibodies, RPR, rheumatoid factor, ALMAS within normal limits  -Would continue doxycycline and steroids at this time    Thank you for the consult.  We will continue to follow while inpatient.  Plan for close outpatient follow-up when she is appropriate for discharge    Rock Lam MD  Hematology and Oncology    7/15/2021  17:06 EDT

## 2021-07-15 NOTE — PROGRESS NOTES
[facility] Medicine Services  PROGRESS NOTE    Patient Name: Elena Copeland  : 1975  MRN: [id number]    Date of Admission: 2021  Primary Care Physician: Jenn Mcclellan APRN    Subjective   Subjective     CC:  F/U fevers, cough    HPI:  Patient seen this morning. No major complaints, feels about the same.    ROS:  Gen-+fevers, +chills  CV-no chest pain, no palpitations  Resp-+cough, no dyspnea  GI-no N/V/D, no abd pain    All other systems reviewed and negative except any additional pertinent positives and negatives as discussed in HPI.      Objective   Objective     Vital Signs:   Temp:  [97.8 °F (36.6 °C)-98.1 °F (36.7 °C)] 97.9 °F (36.6 °C)  Heart Rate:  [66-76] 74  Resp:  [18-20] 20  BP: (112-133)/(66-99) 130/83  Flow (L/min):  [2] 2     Physical Exam:  Gen-no acute distress  HENT-NCAT, mucous membranes moist  CV-RRR, S1 S2 normal, no m/r/g  Resp-CTAB, no wheezes or rales  Abd-soft, NT, ND, +BS  Ext-no edema  Neuro-A&Ox3, no focal deficits  Skin-no rashes  Psych-appropriate mood      Results Reviewed:  LAB RESULTS:      Lab 07/15/21  0148 21  1906 21  1807 21  1602   WBC 3.87  --  5.10 2.38*   HEMOGLOBIN 9.5*  --  10.8* 11.8*   HEMATOCRIT 28.8*  --  32.1* 35.3   PLATELETS 141  --  145 113*   NEUTROS ABS 2.87  --  3.91 1.99   IMMATURE GRANS (ABS) 0.04  --  0.06*  --    LYMPHS ABS 0.54*  --  0.65*  --    MONOS ABS 0.42  --  0.47  --    EOS ABS 0.00  --  0.00  --    MCV 86.7  --  89.7 86.1   SED RATE  --   --   --  24*   CRP  --   --   --  7.71*   PROCALCITONIN  --   --  0.14 0.23   LDH  --   --  623* 709*   PROTIME  --  13.9  --   --    D DIMER QUANT 4.51*  --   --   --          Lab 07/15/21  0148 21  1807 21  1602   SODIUM 131* 138 132*   POTASSIUM 3.7 4.1 3.8   CHLORIDE 100 104 95*   CO2 23.0 22.0 24.6   ANION GAP 8.0 12.0 12.4   BUN 10 10 8   CREATININE 0.53* 0.63 0.71   GLUCOSE 102* 110* 83   CALCIUM 8.2* 8.3* 8.7         Lab  07/15/21  0148 07/14/21  1807 07/13/21  1602   TOTAL PROTEIN 5.6* 6.4 6.6   ALBUMIN 2.60* 3.00* 3.10*   GLOBULIN 3.0 3.4 3.5   ALT (SGPT) 31 38* 48*   AST (SGOT) 40* 49* 62*   BILIRUBIN 0.4 0.4 0.4   ALK PHOS 130* 158* 153*         Lab 07/14/21  1906   PROTIME 13.9   INR 1.10         Lab 07/14/21  1807   TRIGLYCERIDES 135         Lab 07/14/21  1807   FERRITIN 1,392.00*         Brief Urine Lab Results  (Last result in the past 365 days)      Color   Clarity   Blood   Leuk Est   Nitrite   Protein   CREAT   Urine HCG        07/07/21 1419 Dark Yellow Cloudy Negative Small (1+) Negative 30 mg/dL (1+)               Microbiology Results Abnormal     Procedure Component Value - Date/Time    Body Fluid Culture - Body Fluid, Iliac Crest, Right - Aspirate [[id number]] Collected: 07/15/21 1101    Lab Status: Preliminary result Specimen: Body Fluid from Iliac Crest, Right - Aspirate Updated: 07/15/21 1216     Gram Stain No organisms seen    COVID PRE-OP / PRE-PROCEDURE SCREENING ORDER (NO ISOLATION) - Swab, Nasopharynx [[id number]]  (Normal) Collected: 07/14/21 1852    Lab Status: Final result Specimen: Swab from Nasopharynx Updated: 07/14/21 1953    Narrative:      The following orders were created for panel order COVID PRE-OP / PRE-PROCEDURE SCREENING ORDER (NO ISOLATION) - Swab, Nasopharynx.  Procedure                               Abnormality         Status                     ---------                               -----------         ------                     COVID-19,CEPHEID,BROOKE IN-...[[id number]]  Normal              Final result                 Please view results for these tests on the individual orders.    COVID-19,CEPHEID,BROOKE IN-HOUSE(OR EMERGENT/ADD-ON),NP SWAB IN TRANSPORT MEDIA 3-4 HR TAT - Swab, Nasopharynx [[id number]]  (Normal) Collected: 07/14/21 1852    Lab Status: Final result Specimen: Swab from Nasopharynx Updated: 07/14/21 1953     COVID19 Not Detected    Narrative:      Fact sheet for providers:  https://www.fda.gov/media/245757/download     Fact sheet for patients: https://www.fda.gov/media/837845/download  Fact sheet for providers: https://www.fda.gov/media/322831/download     Fact sheet for patients: https://www.fda.gov/media/479978/download          CT Abdomen Pelvis With & Without Contrast    Result Date: 7/15/2021  EXAMINATION: CT ABDOMEN PELVIS W WO CONTRAST-  INDICATION: rule out Budd Chiari syndrome  TECHNIQUE: Axial CT of the abdomen and pelvis performed without and with IV contrast, arterial, portal venous and delayed phase per CT liver protocol with multiplanar reconstruction  The radiation dose reduction device was turned on for each scan per the ALARA (As Low as Reasonably Achievable) protocol.  COMPARISON: NONE  FINDINGS: The lung bases are grossly clear. Body wall soft tissues are unremarkable. There is no evidence of acute fracture or aggressive osseous lesion. The liver demonstrates normal and homogeneous enhancement without evidence of suspicious focal lesion. There is normal opacification of the hepatic venous structures with flow into the IVC, demonstrating no heterogeneous enhancement or other parenchymal signal abnormality to suggest venous congestion or Budd-Chiari syndrome. There is no biliary ductal dilatation. The gallbladder is normal. Enlarged spleen measuring up to 15 cm, otherwise homogeneous and unremarkable in appearance. The pancreas and bilateral adrenal glands are homogeneous. The kidneys demonstrate symmetric nephrogram and contrast excretion without evidence of hydronephrosis or contour deforming mass. Small and large bowel loops are nondilated. There is no suspicious focal bowel wall thickening. No free fluid or pneumoperitoneum. Normal abdominal aorta. No bulky retroperitoneal adenopathy. The pelvic viscera are unremarkable.      Impression: Normal multiphase contrast-enhanced appearance of the liver without evidence of suspicious focal lesion, heterogeneous  enhancement or other parenchymal abnormality to suggest Budd-Chiari syndrome.  Enlarged but otherwise homogeneous spleen measuring up to 15 cm.  No acute findings in the abdomen and pelvis otherwise.   This report was finalized on 7/15/2021 10:54 AM by Manpreet Milton.            I have reviewed the medications:  Scheduled Meds:albuterol sulfate HFA, 2 puff, Inhalation, Q6H - RT  cetirizine, 10 mg, Oral, Daily  doxycycline, 100 mg, Oral, BID  fluticasone, 1 spray, Each Nare, Daily  lidocaine PF 1%, 10 mL, Injection, Once  pantoprazole, 40 mg, Oral, Q AM  predniSONE, 40 mg, Oral, Daily With Breakfast   Followed by  [START ON 7/20/2021] predniSONE, 20 mg, Oral, Daily With Breakfast  sertraline, 100 mg, Oral, Daily  sodium chloride, 10 mL, Intravenous, Q12H      Continuous Infusions:   PRN Meds:.albuterol sulfate HFA  •  amitriptyline  •  promethazine-codeine  •  propranolol  •  sodium chloride    Assessment/Plan   Assessment & Plan     Active Hospital Problems    Diagnosis  POA   • **Splenomegaly [R16.1]  Yes   • Hypoxia [R09.02]  Yes   • Elevated liver enzymes [R74.8]  Yes   • Depression with anxiety [F41.8]  Yes   • Anxiety [F41.9]  Yes   • Primary insomnia [F51.01]  Yes      Resolved Hospital Problems   No resolved problems to display.        Brief Hospital Course to date:  Elena Copeland is a 45 y.o. female with hx of GERD, insomnia, anxiety, and depression presents as a direct admission from Pulmonary clinic due to persistent fevers, cough, exertional dyspnea, and intermittent nausea/vomiting x 4 weeks. Had recently returned from vacation to [facility] prior to symptom onset. Had COVID-19 in Dec 2020, did not require hospitalization. She has been evaluated by her PCP and is s/p course of Azithromycin, steroids, and antitussives without improvement in symptoms. Was referred to Pulmonary--CT chest done 7/9/21 showed 5 mm nodule in right lower lobe. Outpatient abdominal ultrasound showed  splenomegaly and hepatic steatosis. Labs obtained showed thrombocytopenia and leukopenia, thus prompting direct hospital admission.    *All problems are new to me today.    Splenomegaly  Thrombocytopenia, leukopenia  --Improved today.  --Etiology unclear--hematologic vs autoimmune?  --CT A/P with three phase contrast no acute findings, no Budd-Chiari.   --Bone marrow biopsy done today, results pending.  --Hem/Onc consulted, discussed with Dr. Gibbs who plans to see patient later today.  Blastomyces antigen, Bordetella pertussis antibdy, fungal antibodies, quantitative double immunodiffusion, Histoplasma Ag Ur, Quantiferon TB, Aspergillus antibodies, cryptococcal antigen, EBV, and HIV labs were obtained during Pulmonary office visit. Beta 2 microglobulin, Ehrlichia antibody panel, [facility] pending .  --Her labs have improved today.    Hypoxia with bronchospasm  --RA sats 88% at rest on admission.  --CT chest with contrast done at outside facility--no report to review.  --Started on prednisone and Doxycycline.  --On 2 liters currently.     Elevated LFT's  --Fatty infiltration of liver noted on ultrasound.  --Monitor.    Anxiety/depression  --Continue Zoloft.     Insomnia  --Continue Amitriptyline.     DVT prophylaxis:  Mechanical DVT prophylaxis orders are present.       Disposition: I expect the patient to be discharged TBD    CODE STATUS:   Code Status and Medical Interventions:   Ordered at: 07/14/21 3698     Level Of Support Discussed With:    Patient     Code Status:    CPR     Medical Interventions (Level of Support Prior to Arrest):    Full       Julieta Harp MD  07/15/21

## 2021-07-15 NOTE — CASE MANAGEMENT/SOCIAL WORK
Discharge Planning Assessment  [facility]     Patient Name: Elena Copeland  MRN: [id number]  Today's Date: 7/15/2021    Admit Date: 7/14/2021    Discharge Needs Assessment     Row Name 07/15/21 1449       Living Environment    Lives With  significant other    Name(s) of Who Lives With Patient  Miki- SO    Current Living Arrangements  home/apartment/condo    Primary Care Provided by  self    Provides Primary Care For  no one    Family Caregiver if Needed  significant other    Family Caregiver Names  Miki- SO    Quality of Family Relationships  helpful;involved    Able to Return to Prior Arrangements  yes       Resource/Environmental Concerns    Resource/Environmental Concerns  none    Transportation Concerns  car, none       Transition Planning    Patient/Family Anticipates Transition to  home    Patient/Family Anticipated Services at Transition  none    Transportation Anticipated  family or friend will provide       Discharge Needs Assessment    Equipment Currently Used at Home  nebulizer    Concerns to be Addressed  denies needs/concerns at this time    Anticipated Changes Related to Illness  none    Equipment Needed After Discharge  none    Provided Post Acute Provider List?  N/A    N/A Provider List Comment  DC needs unknown        Discharge Plan     Row Name 07/15/21 3111       Plan    Plan  Home    Patient/Family in Agreement with Plan  yes    Plan Comments  Spoke with pt. at bedside. Lives with SO in [facility] with ADL's. Uses a nebulizer but denies other needs. Her plan is to dc to home. States her SO can provide transport.    Final Discharge Disposition Code  01 - home or self-care        Continued Care and Services - Admitted Since 7/14/2021    Coordination has not been started for this encounter.         Demographic Summary    No documentation.       Functional Status     Row Name 07/15/21 1449       Functional Status    Usual Activity Tolerance  good       Functional Status, IADL     Medications  independent    Meal Preparation  independent    Housekeeping  independent    Laundry  independent    Shopping  independent       Mental Status Summary    Recent Changes in Mental Status/Cognitive Functioning  no changes        Psychosocial    No documentation.       Abuse/Neglect    No documentation.       Legal    No documentation.       Substance Abuse    No documentation.       Patient Forms    No documentation.           Rayna Sawant RN

## 2021-07-15 NOTE — H&P
"    [facility] Medicine Services  HISTORY AND PHYSICAL    Patient Name: Elena Copeland  : 1975  MRN: [id number]  Primary Care Physician: Jenn Mcclellan APRN  Date of admission: 2021    Subjective   Subjective     Chief Complaint:  Persistent fever, cough, diaphoresis     HPI:  Elena Copeland is a 45 y.o. female past medical history significant for GERD, insomnia, anxiety and depression presents as a direct [facility] due to persistent fever, cough and diaphoresis.  Patient reports being diagnosed with COVID-19 in 2020.  She did not require hospitalization at that time he reports her symptoms resolved within 5 days.  Approximately 4 weeks ago patient reports she had just gotten back from vacation at the [facility] in [place].  She had taken a nap and woke up \"drenched in sweat.\"  Since then she has had a persistent cough with no sputum production, fever as high as 103, decreased appetite, exertional dyspnea and intermittent nausea and vomiting.  She has been evaluated by her PCP and had a course of azithromycin, steroids and antitussives with no improvement of her symptoms.  [facility] was [facility] due to an abnormal CT of her chest that was obtained on 2021 that showed a 5 mm nodule in her right lower lobe.  She was evaluated by Dr. Claudio on 2021.  Labs were obtained.  She received a phone call today from the hematology and oncology nurse navigator to notify her lab results.  Decision was made for the patient be directly admitted.      Currently patient denies any chest pain, palpitations, dysuria, hematuria, melena or hemoptysis.  Patient will be admitted to [facility] under the care of the Hospitalist for further evaluation and treatment.   REQUIRING OXYGEN WITH ra SATS 88%    COVID Details:    Symptoms:    [] NONE [x] Fever [x]  Cough [x] Shortness of breath [] Change in taste/smell      The " patient qualifies to receive the vaccine, but they have not yet received it.      Review of Systems   Constitutional: Positive for activity change, appetite change, diaphoresis, fatigue, fever and unexpected weight change.   HENT: Negative.    Eyes: Negative for photophobia and visual disturbance.   Respiratory: Positive for cough and shortness of breath.    Cardiovascular: Negative for chest pain, palpitations and leg swelling.   Gastrointestinal: Positive for nausea and vomiting. Negative for abdominal distention, abdominal pain, blood in stool, constipation and diarrhea.   Genitourinary: Negative for difficulty urinating, dysuria, flank pain and frequency.   Musculoskeletal: Negative.    Skin: Negative.    Neurological: Positive for weakness. Negative for dizziness, facial asymmetry, speech difficulty, light-headedness, numbness and headaches.   Psychiatric/Behavioral: Negative.         All other systems reviewed and are negative.     Personal History     Past Medical History:   Diagnosis Date   • GERD (gastroesophageal reflux disease)    • Headache    • Hip pain    • Low back pain    • Splenomegaly 7/13/2021       Past Surgical History:   Procedure Laterality Date   • ANKLE SURGERY     • APPENDECTOMY     • LASIK     • SINUS SURGERY         Family History: family history includes Cancer in her paternal grandmother; Depression in her mother; Diabetes in her father; Hypertension in her mother; Parkinsonism in her maternal grandmother. Otherwise pertinent FHx was reviewed and unremarkable.     Social History:  reports that she quit smoking about 13 years ago. Her smoking use included cigarettes. She has a 1.00 pack-year smoking history. She has never used smokeless tobacco. She reports current alcohol use. She reports that she does not use drugs.  Has a boyfriend no kids, works at [employer]    Medications:  QUEtiapine, albuterol, amitriptyline, cetirizine, doxycycline, fluticasone, ibuprofen, omeprazole,  predniSONE, promethazine-codeine, propranolol, sertraline, and vitamin D3    Allergies   Allergen Reactions   • Eggs Or Egg-Derived Products    • Sudafed [Pseudoephedrine]        Objective   Objective     Vital Signs:   Temp:  [97.9 °F (36.6 °C)-98.1 °F (36.7 °C)] 98.1 °F (36.7 °C)  Heart Rate:  [71-76] 76  Resp:  [18] 18  BP: (112-118)/(79-80) 118/80  Flow (L/min):  [2] 2    Physical Exam  Vitals and nursing note reviewed.   Constitutional:       General: She is not in acute distress.     Appearance: Normal appearance. She is not ill-appearing, toxic-appearing or diaphoretic.   HENT:      Head: Normocephalic and atraumatic.      Nose: Nose normal.      Mouth/Throat:      Mouth: Mucous membranes are dry.      Pharynx: Oropharynx is clear.   Eyes:      Extraocular Movements: Extraocular movements intact.      Conjunctiva/sclera: Conjunctivae normal.      Pupils: Pupils are equal, round, and reactive to light.   Cardiovascular:      Rate and Rhythm: Normal rate and regular rhythm.      Pulses: Normal pulses.      Heart sounds: Normal heart sounds.   Pulmonary:      Effort: Pulmonary effort is normal.      Comments: Decreased breath sounds to lower lobes   Abdominal:      General: Bowel sounds are normal. There is no distension.      Palpations: There is no mass.      Tenderness: There is no abdominal tenderness. There is no right CVA tenderness, left CVA tenderness, guarding or rebound.      Hernia: No hernia is present.   Musculoskeletal:         General: No swelling, tenderness, deformity or signs of injury. Normal range of motion.      Cervical back: Normal range of motion and neck supple.      Right lower leg: No edema.      Left lower leg: No edema.   Skin:     General: Skin is warm.   Neurological:      General: No focal deficit present.      Mental Status: She is alert and oriented to person, place, and time. Mental status is at baseline.   Psychiatric:         Mood and Affect: Mood normal.         Behavior:  Behavior normal.         Thought Content: Thought content normal.         Judgment: Judgment normal.            Results Reviewed:  I have personally reviewed most recent indicated data and agree with findings including:  [x]  Laboratory  [x]  Radiology  [x]  EKG/Telemetry  []  Pathology  []  Cardiac/Vascular Studies  []  Old records  []  Other:  Most pertinent findings include:low cbc, high ldh, big spleen, abn LFTs    LAB RESULTS:      Lab 07/14/21  1906 07/14/21 1807 07/13/21  1602   WBC  --  5.10 2.38*   HEMOGLOBIN  --  10.8* 11.8*   HEMATOCRIT  --  32.1* 35.3   PLATELETS  --  145 113*   NEUTROS ABS  --  3.91 1.99   IMMATURE GRANS (ABS)  --  0.06*  --    LYMPHS ABS  --  0.65*  --    MONOS ABS  --  0.47  --    EOS ABS  --  0.00  --    MCV  --  89.7 86.1   SED RATE  --   --  24*   CRP  --   --  7.71*   PROCALCITONIN  --  0.14 0.23   LDH  --  623* 709*   PROTIME 13.9  --   --          Lab 07/14/21 1807 07/13/21  1602   SODIUM 138 132*   POTASSIUM 4.1 3.8   CHLORIDE 104 95*   CO2 22.0 24.6   ANION GAP 12.0 12.4   BUN 10 8   CREATININE 0.63 0.71   GLUCOSE 110* 83   CALCIUM 8.3* 8.7         Lab 07/14/21 1807 07/13/21  1602   TOTAL PROTEIN 6.4 6.6   ALBUMIN 3.00* 3.10*   GLOBULIN 3.4 3.5   ALT (SGPT) 38* 48*   AST (SGOT) 49* 62*   BILIRUBIN 0.4 0.4   ALK PHOS 158* 153*         Lab 07/14/21 1906   PROTIME 13.9   INR 1.10         Lab 07/14/21  1807   TRIGLYCERIDES 135             Brief Urine Lab Results  (Last result in the past 365 days)      Color   Clarity   Blood   Leuk Est   Nitrite   Protein   CREAT   Urine HCG        07/07/21 1419 Dark Yellow Cloudy Negative Small (1+) Negative 30 mg/dL (1+)             Microbiology Results (last 10 days)     Procedure Component Value - Date/Time    COVID PRE-OP / PRE-PROCEDURE SCREENING ORDER (NO ISOLATION) - Swab, Nasopharynx [[id number]]  (Normal) Collected: 07/14/21 1852    Lab Status: Final result Specimen: Swab from Nasopharynx Updated: 07/14/21 1953    Narrative:      The  following orders were created for panel order COVID PRE-OP / PRE-PROCEDURE SCREENING ORDER (NO ISOLATION) - Swab, Nasopharynx.  Procedure                               Abnormality         Status                     ---------                               -----------         ------                     COVID-19,CEPHEID,BROOKE IN-...[[id number]]  Normal              Final result                 Please view results for these tests on the individual orders.    COVID-19,CEPHEID,BROOKE IN-HOUSE(OR EMERGENT/ADD-ON),NP SWAB IN TRANSPORT MEDIA 3-4 HR TAT - Swab, Nasopharynx [[id number]]  (Normal) Collected: 07/14/21 1852    Lab Status: Final result Specimen: Swab from Nasopharynx Updated: 07/14/21 1953     COVID19 Not Detected    Narrative:      Fact sheet for providers: https://www.fda.gov/media/716396/download     Fact sheet for patients: https://www.fda.gov/media/624405/download  Fact sheet for providers: https://www.fda.gov/media/831141/download     Fact sheet for patients: https://www.fda.gov/media/472887/download    Blood Culture - Blood, Arm, Left [[id number]] Collected: 07/07/21 1419    Lab Status: Final result Specimen: Blood from Arm, Left Updated: 07/12/21 1430     Blood Culture No growth at 5 days    S. Pneumo Ag Urine or CSF - Urine, Urine, Clean Catch [[id number]]  (Normal) Collected: 07/07/21 1419    Lab Status: Final result Specimen: Urine, Clean Catch Updated: 07/08/21 0211     Strep Pneumo Ag Negative    Legionella Antigen, Urine - Urine, Urine, Clean Catch [[id number]]  (Normal) Collected: 07/07/21 1419    Lab Status: Final result Specimen: Urine, Clean Catch Updated: 07/08/21 0211     LEGIONELLA ANTIGEN, URINE Negative    Urine Culture - Urine, Urine, Clean Catch [[id number]] Collected: 07/07/21 1419    Lab Status: Final result Specimen: Urine, Clean Catch Updated: 07/09/21 1005     Urine Culture <10,000 CFU/mL Mixed Lily Isolated    Narrative:      Specimen contains mixed organisms of questionable pathogenicity  which indicates contamination with commensal bridger.  Further identification is unlikely to provide clinically useful information.  Suggest recollection.          CT outside films    Result Date: 7/13/2021  This procedure was auto-finalized with no dictation required.          Assessment/Plan   Assessment & Plan       Splenomegaly    Anxiety    Primary insomnia    Elevated liver enzymes    Depression with anxiety      45 year old female presents as a direct admission due to ongoing fever, night sweats and cough for the past four weeks.     1) Splenomegaly      Thrombocytopenia, leukopenia  -etiology not completely clear: Concerns for hematological disorders versus autoimmune  -US of abdomen on 7/9/21 notes splenomegaly measuring up to 16.2 cm concerning for developing portal hypertension.    -blastomyces antigen, bordetella pertussis antibdy, fungal antibodies, quantitative double immunodiffusion, histoplasma Ag Ur, Quant to [facility], Aspergillus antibodies, cryptococcal antigen, EBV and HIV labs were obtained yesterday.  -We will consult hematology/oncology in a.m.  -Plan for bone marrow biopsy  -Beta 2 microglobulin, ehrlichia antibody panel, [facility] pending   -Peripheral blood smear pending  ELEVATED bets 2 microglobulin    2) elevated liver enzymes  -Fatty infiltration of the liver noted on ultrasound   -Monitor  -Hold hepatotoxic agents    3) depression with anxiety  -On Zoloft    4) insomnia  -Continue amitriptyline    5) Hypoxia  With bronchospasm- RA sats 88% at rest  DDimer is pending  -    DVT prophylaxis: scds    CODE STATUS:  Full code   Code Status and Medical Interventions:   Ordered at: 07/14/21 3177     Level Of Support Discussed With:    Patient     Code Status:    CPR     Medical Interventions (Level of Support Prior to Arrest):    Full       This note has been completed as part of a split-shared workflow.     Signature: Electronically signed by CHAVA Morse, 07/14/21, 10:32 PM  EDT.    Attending   Admission Attestation       I have seen and examined the patient, performing an independent face-to-face diagnostic evaluation with plan of care reviewed and developed with the advanced practice clinician (APC).      Brief Summary Statement:     Elena Copeland is a 45 y.o. female past medical history significant for GERD, insomnia, anxiety and depression presents [facility] due to persistent fever, cough and night sweats, anorexia and fatigue, she has pancytopenia, abn lts  Tonight was hypoxic at rest, wheezes with exertion.    Remainder of detailed HPI is as noted by APC and has been reviewed and/or edited by me for completeness.    Attending Physical Exam:  Temp:  [97.8 °F (36.6 °C)-98.1 °F (36.7 °C)] 97.8 °F (36.6 °C)  Heart Rate:  [66-76] 66  Resp:  [18-20] 20  BP: (112-118)/(66-80) 116/66  Flow (L/min):  [2] 2    Patient is alert and talkative but miserable and winded at rest  Neck is without mass or JVD  Heart is Reg wo murmur  Lungs are clear wo wheeze or crackle  Abdomen is soft palpable spleen, not tender or distended  MAEW, no edema  Skin is without rash  Neurologic exam is nonfocal   Mood is appropriate    Brief Assessment/Plan :  See detailed assessment and plan developed with APC which I have reviewed and/or edited for completeness.    I believe this patient meets OBs status for now.    Electronically signed by Gilma Joyner MD, 07/15/21, 12:45 AM EDT.

## 2021-07-16 PROBLEM — D61.818 PANCYTOPENIA (HCC): Status: ACTIVE | Noted: 2021-07-16

## 2021-07-16 LAB
1,3 BETA GLUCAN SER-MCNC: <31 PG/ML
ALBUMIN SERPL-MCNC: 2.6 G/DL (ref 3.5–5.2)
ALBUMIN/GLOB SERPL: 0.9 G/DL
ALP SERPL-CCNC: 115 U/L (ref 39–117)
ALT SERPL W P-5'-P-CCNC: 34 U/L (ref 1–33)
ANION GAP SERPL CALCULATED.3IONS-SCNC: 10 MMOL/L (ref 5–15)
AST SERPL-CCNC: 35 U/L (ref 1–32)
B PERT IGA SER QL IA: <1 INDEX (ref 0–0.9)
B PERT IGG SER IA-ACNC: 3.29 INDEX (ref 0–0.94)
B PERT IGM SER IA-ACNC: 3.2 INDEX (ref 0–0.9)
BASOPHILS # BLD AUTO: 0 10*3/MM3 (ref 0–0.2)
BASOPHILS NFR BLD AUTO: 0 % (ref 0–1.5)
BILIRUB SERPL-MCNC: 0.3 MG/DL (ref 0–1.2)
BUN SERPL-MCNC: 11 MG/DL (ref 6–20)
BUN/CREAT SERPL: 20.4 (ref 7–25)
CALCIUM SPEC-SCNC: 8.1 MG/DL (ref 8.6–10.5)
CHLORIDE SERPL-SCNC: 104 MMOL/L (ref 98–107)
CMV IGG SERPL IA-ACNC: >10 U/ML (ref 0–0.59)
CMV IGM SERPL IA-ACNC: 35.1 AU/ML (ref 0–29.9)
CO2 SERPL-SCNC: 22 MMOL/L (ref 22–29)
CREAT SERPL-MCNC: 0.54 MG/DL (ref 0.57–1)
DEPRECATED RDW RBC AUTO: 46 FL (ref 37–54)
EBV NA IGG SER IA-ACNC: 128 U/ML (ref 0–17.9)
EBV VCA IGG SER IA-ACNC: >600 U/ML (ref 0–17.9)
EBV VCA IGM SER IA-ACNC: <36 U/ML (ref 0–35.9)
EOSINOPHIL # BLD AUTO: 0.03 10*3/MM3 (ref 0–0.4)
EOSINOPHIL NFR BLD AUTO: 1.4 % (ref 0.3–6.2)
ERYTHROCYTE [DISTWIDTH] IN BLOOD BY AUTOMATED COUNT: 14.3 % (ref 12.3–15.4)
GAMMA INTERFERON BACKGROUND BLD IA-ACNC: 2.24 IU/ML
GFR SERPL CREATININE-BSD FRML MDRD: 123 ML/MIN/1.73
GIE STN SPEC: NORMAL
GLOBULIN UR ELPH-MCNC: 2.8 GM/DL
GLUCOSE SERPL-MCNC: 82 MG/DL (ref 65–99)
H CAPSUL AG UR QL IA: <0.5
HCT VFR BLD AUTO: 28.5 % (ref 34–46.6)
HGB BLD-MCNC: 9.4 G/DL (ref 12–15.9)
IMM GRANULOCYTES # BLD AUTO: 0.02 10*3/MM3 (ref 0–0.05)
IMM GRANULOCYTES NFR BLD AUTO: 0.9 % (ref 0–0.5)
LYMPHOCYTES # BLD AUTO: 0.39 10*3/MM3 (ref 0.7–3.1)
LYMPHOCYTES NFR BLD AUTO: 17.8 % (ref 19.6–45.3)
Lab: NORMAL
M TB IFN-G BLD-IMP: NEGATIVE
M TB IFN-G CD4+ BCKGRND COR BLD-ACNC: 2.2 IU/ML
M TB IFN-G CD4+CD8+ BCKGRND COR BLD-ACNC: 2.24 IU/ML
MCH RBC QN AUTO: 29.7 PG (ref 26.6–33)
MCHC RBC AUTO-ENTMCNC: 33 G/DL (ref 31.5–35.7)
MCV RBC AUTO: 90.2 FL (ref 79–97)
MITOGEN IGNF BLD-ACNC: 7.84 IU/ML
MONOCYTES # BLD AUTO: 0.32 10*3/MM3 (ref 0.1–0.9)
MONOCYTES NFR BLD AUTO: 14.6 % (ref 5–12)
NEUTROPHILS NFR BLD AUTO: 1.43 10*3/MM3 (ref 1.7–7)
NEUTROPHILS NFR BLD AUTO: 65.3 % (ref 42.7–76)
NRBC BLD AUTO-RTO: 0 /100 WBC (ref 0–0.2)
PLAT MORPH BLD: NORMAL
PLATELET # BLD AUTO: 130 10*3/MM3 (ref 140–450)
PMV BLD AUTO: 9.8 FL (ref 6–12)
POTASSIUM SERPL-SCNC: 3.9 MMOL/L (ref 3.5–5.2)
PROT SERPL-MCNC: 5.4 G/DL (ref 6–8.5)
RBC # BLD AUTO: 3.16 10*6/MM3 (ref 3.77–5.28)
RBC MORPH BLD: NORMAL
SERVICE CMNT-IMP: ABNORMAL
SERVICE CMNT-IMP: NORMAL
SODIUM SERPL-SCNC: 136 MMOL/L (ref 136–145)
WBC # BLD AUTO: 2.19 10*3/MM3 (ref 3.4–10.8)
WBC MORPH BLD: NORMAL

## 2021-07-16 PROCEDURE — 99152 MOD SED SAME PHYS/QHP 5/>YRS: CPT

## 2021-07-16 PROCEDURE — 63710000001 PREDNISONE PER 1 MG: Performed by: INTERNAL MEDICINE

## 2021-07-16 PROCEDURE — 83883 ASSAY NEPHELOMETRY NOT SPEC: CPT | Performed by: INTERNAL MEDICINE

## 2021-07-16 PROCEDURE — 94799 UNLISTED PULMONARY SVC/PX: CPT

## 2021-07-16 PROCEDURE — 85025 COMPLETE CBC W/AUTO DIFF WBC: CPT | Performed by: INTERNAL MEDICINE

## 2021-07-16 PROCEDURE — 80053 COMPREHEN METABOLIC PANEL: CPT | Performed by: INTERNAL MEDICINE

## 2021-07-16 PROCEDURE — 99153 MOD SED SAME PHYS/QHP EA: CPT

## 2021-07-16 PROCEDURE — 99232 SBSQ HOSP IP/OBS MODERATE 35: CPT | Performed by: INTERNAL MEDICINE

## 2021-07-16 PROCEDURE — 84165 PROTEIN E-PHORESIS SERUM: CPT | Performed by: INTERNAL MEDICINE

## 2021-07-16 PROCEDURE — 99233 SBSQ HOSP IP/OBS HIGH 50: CPT | Performed by: HOSPITALIST

## 2021-07-16 PROCEDURE — 85007 BL SMEAR W/DIFF WBC COUNT: CPT | Performed by: INTERNAL MEDICINE

## 2021-07-16 RX ADMIN — CETIRIZINE HYDROCHLORIDE 10 MG: 10 TABLET, FILM COATED ORAL at 08:33

## 2021-07-16 RX ADMIN — DOXYCYCLINE 100 MG: 100 CAPSULE ORAL at 20:30

## 2021-07-16 RX ADMIN — ALBUTEROL SULFATE 2 PUFF: 108 AEROSOL, METERED RESPIRATORY (INHALATION) at 13:42

## 2021-07-16 RX ADMIN — SERTRALINE HYDROCHLORIDE 100 MG: 100 TABLET ORAL at 08:33

## 2021-07-16 RX ADMIN — DOXYCYCLINE 100 MG: 100 CAPSULE ORAL at 08:33

## 2021-07-16 RX ADMIN — SODIUM CHLORIDE, PRESERVATIVE FREE 10 ML: 5 INJECTION INTRAVENOUS at 20:31

## 2021-07-16 RX ADMIN — FLUTICASONE PROPIONATE 1 SPRAY: 50 SPRAY, METERED NASAL at 08:34

## 2021-07-16 RX ADMIN — ALBUTEROL SULFATE 2 PUFF: 108 AEROSOL, METERED RESPIRATORY (INHALATION) at 07:48

## 2021-07-16 RX ADMIN — PREDNISONE 40 MG: 20 TABLET ORAL at 08:33

## 2021-07-16 RX ADMIN — ALBUTEROL SULFATE 2 PUFF: 108 AEROSOL, METERED RESPIRATORY (INHALATION) at 19:34

## 2021-07-16 RX ADMIN — PANTOPRAZOLE SODIUM 40 MG: 40 TABLET, DELAYED RELEASE ORAL at 06:31

## 2021-07-16 RX ADMIN — AMITRIPTYLINE HYDROCHLORIDE 50 MG: 50 TABLET, FILM COATED ORAL at 20:54

## 2021-07-16 NOTE — PLAN OF CARE
Problem: Adult Inpatient Plan of Care  Goal: Plan of Care Review  Outcome: Ongoing, Progressing  >: VSS. Patient remains alert and oriented 4. C/o anxiety managed by PRN medications with adequate relief a/e/b patient sleeping. Oxygen intact at 2L/minute via nasal cannula. NSR via telemetry. Denies pain. Up to date on POC. Otherwise, no acute events overnight. Belongings and call  light at the bedside and within reach. Continuing to monitor.    Ginny Heard RN  03:49 EDT  07/16/21

## 2021-07-16 NOTE — CASE MANAGEMENT/SOCIAL WORK
Continued Stay Note  [facility]     Patient Name: Elena Copeland  MRN: [id number]  Today's Date: 7/16/2021    Admit Date: 7/14/2021    Discharge Plan     Row Name 07/16/21 1137       Plan    Plan  Home    Patient/Family in Agreement with Plan  yes    Plan Comments  Spoke with pt. and SO at bedside. Plan is still home at [place]. Waiting on results from bone marrow bx. Will continue to follow.    Final Discharge Disposition Code  01 - home or self-care        Discharge Codes    No documentation.             Rayna Sawant RN

## 2021-07-16 NOTE — PLAN OF CARE
Goal Outcome Evaluation:      VSS. NSR on tele. 2L NC. Pt rested on and off through the day. Continue to monitor.

## 2021-07-16 NOTE — POST-PROCEDURE NOTE
Interventional Radiology Operative Note    Date: 07/15/2021    Time: 0936    Pre-op Diagnosis: Pancytopenia  Post-op Diagnosis: same    Procedure: CT guided bone marrow biopsy.    Surgeon: Angélica Tripp M.D.  Assistants: None    Sedation: Moderate.    Estimated Blood Loss (EBL): Trace     Urine Output (UOP): N/A (short procedure)    IVF: N/A (short procedure)    Findings: Uneventful biopsy.    Specimens: 15 mL bone marrow and 3 cm plug.    Complications: None.    Disposition: Patient to recovery.

## 2021-07-16 NOTE — PROGRESS NOTES
[facility] Medicine Services  PROGRESS NOTE    Patient Name: Elena Copeland  : 1975  MRN: [id number]    Date of Admission: 2021  Primary Care Physician: Jenn Mcclellan APRN    Subjective   Subjective     CC:  F/U fevers, cough    HPI:  Patient seen this morning. Family at bedside. She had some sweats overnight but no documented fevers. Still feels a little short of air at times.     ROS:  Gen-+fevers, +chills  CV-no chest pain, no palpitations  Resp-+cough, +intermittent dyspnea  GI-no N/V/D, no abd pain    All other systems reviewed and negative except any additional pertinent positives and negatives as discussed in HPI.      Objective   Objective     Vital Signs:   Temp:  [97.4 °F (36.3 °C)-98.4 °F (36.9 °C)] 97.5 °F (36.4 °C)  Heart Rate:  [74-94] 89  Resp:  [16-20] 16  BP: (114-143)/(71-89) 131/89  Flow (L/min):  [2] 2     Physical Exam:  Gen-no acute distress  HENT-NCAT, mucous membranes moist  CV-RRR, S1 S2 normal, no m/r/g  Resp-CTAB, no wheezes or rales  Abd-soft, NT, ND, +BS  Ext-no edema  Neuro-A&Ox3, no focal deficits  Skin-no rashes  Psych-appropriate mood  No change from yesterday    Results Reviewed:  LAB RESULTS:      Lab 21  0713 07/15/21  0148 21  1906 21  1807 21  1602   WBC 2.19* 3.87  --  5.10 2.38*   HEMOGLOBIN 9.4* 9.5*  --  10.8* 11.8*   HEMATOCRIT 28.5* 28.8*  --  32.1* 35.3   PLATELETS 130* 141  --  145 113*   NEUTROS ABS 1.43* 2.87  --  3.91 1.99   IMMATURE GRANS (ABS) 0.02 0.04  --  0.06*  --    LYMPHS ABS 0.39* 0.54*  --  0.65*  --    MONOS ABS 0.32 0.42  --  0.47  --    EOS ABS 0.03 0.00  --  0.00  --    MCV 90.2 86.7  --  89.7 86.1   SED RATE  --   --   --   --  24*   CRP  --   --   --   --  7.71*   PROCALCITONIN  --   --   --  0.14 0.23   LDH  --   --   --  623* 709*   PROTIME  --   --  13.9  --   --    D DIMER QUANT  --  4.51*  --   --   --          Lab 21  0713 07/15/21  0148 21  1800  07/13/21  1602   SODIUM 136 131* 138 132*   POTASSIUM 3.9 3.7 4.1 3.8   CHLORIDE 104 100 104 95*   CO2 22.0 23.0 22.0 24.6   ANION GAP 10.0 8.0 12.0 12.4   BUN 11 10 10 8   CREATININE 0.54* 0.53* 0.63 0.71   GLUCOSE 82 102* 110* 83   CALCIUM 8.1* 8.2* 8.3* 8.7         Lab 07/16/21  0713 07/15/21  0148 07/14/21  1807 07/13/21  1602   TOTAL PROTEIN 5.4* 5.6* 6.4 6.6   ALBUMIN 2.60* 2.60* 3.00* 3.10*   GLOBULIN 2.8 3.0 3.4 3.5   ALT (SGPT) 34* 31 38* 48*   AST (SGOT) 35* 40* 49* 62*   BILIRUBIN 0.3 0.4 0.4 0.4   ALK PHOS 115 130* 158* 153*         Lab 07/14/21  1906   PROTIME 13.9   INR 1.10         Lab 07/14/21  1807   TRIGLYCERIDES 135         Lab 07/15/21  1343 07/14/21  1807   IRON 45  --    IRON SATURATION 16*  --    TIBC 279*  --    TRANSFERRIN 187*  --    FERRITIN  --  1,392.00*         Brief Urine Lab Results  (Last result in the past 365 days)      Color   Clarity   Blood   Leuk Est   Nitrite   Protein   CREAT   Urine HCG        07/07/21 1419 Dark Yellow Cloudy Negative Small (1+) Negative 30 mg/dL (1+)               Microbiology Results Abnormal     Procedure Component Value - Date/Time    Body Fluid Culture - Body Fluid, Iliac Crest, Right - Aspirate [[id number]] Collected: 07/15/21 1101    Lab Status: Preliminary result Specimen: Body Fluid from Iliac Crest, Right - Aspirate Updated: 07/16/21 1326     Body Fluid Culture No growth     Gram Stain No organisms seen    AFB Culture - Aspirate, Iliac Crest, Right - Aspirate [[id number]] Collected: 07/15/21 1100    Lab Status: Preliminary result Specimen: Iliac Crest, Right - Aspirate Updated: 07/16/21 1122     AFB Stain No acid fast bacilli seen on direct smear    COVID PRE-OP / PRE-PROCEDURE SCREENING ORDER (NO ISOLATION) - Swab, Nasopharynx [[id number]]  (Normal) Collected: 07/14/21 1852    Lab Status: Final result Specimen: Swab from Nasopharynx Updated: 07/14/21 1953    Narrative:      The following orders were created for panel order COVID PRE-OP /  PRE-PROCEDURE SCREENING ORDER (NO ISOLATION) - Swab, Nasopharynx.  Procedure                               Abnormality         Status                     ---------                               -----------         ------                     COVID-19,CEPHEID,BROOKE IN-...[[id number]]  Normal              Final result                 Please view results for these tests on the individual orders.    COVID-19,CEPHEID,BROOKE IN-HOUSE(OR EMERGENT/ADD-ON),NP SWAB IN TRANSPORT MEDIA 3-4 HR TAT - Swab, Nasopharynx [[id number]]  (Normal) Collected: 07/14/21 1852    Lab Status: Final result Specimen: Swab from Nasopharynx Updated: 07/14/21 1953     COVID19 Not Detected    Narrative:      Fact sheet for providers: https://www.fda.gov/media/516196/download     Fact sheet for patients: https://www.fda.gov/media/242055/download  Fact sheet for providers: https://www.fda.gov/media/659567/download     Fact sheet for patients: https://www.fda.gov/media/272766/download          CT Abdomen Pelvis With & Without Contrast    Result Date: 7/15/2021  EXAMINATION: CT ABDOMEN PELVIS W WO CONTRAST-  INDICATION: rule out Budd Chiari syndrome  TECHNIQUE: Axial CT of the abdomen and pelvis performed without and with IV contrast, arterial, portal venous and delayed phase per CT liver protocol with multiplanar reconstruction  The radiation dose reduction device was turned on for each scan per the ALARA (As Low as Reasonably Achievable) protocol.  COMPARISON: NONE  FINDINGS: The lung bases are grossly clear. Body wall soft tissues are unremarkable. There is no evidence of acute fracture or aggressive osseous lesion. The liver demonstrates normal and homogeneous enhancement without evidence of suspicious focal lesion. There is normal opacification of the hepatic venous structures with flow into the IVC, demonstrating no heterogeneous enhancement or other parenchymal signal abnormality to suggest venous congestion or Budd-Chiari syndrome. There is no biliary  ductal dilatation. The gallbladder is normal. Enlarged spleen measuring up to 15 cm, otherwise homogeneous and unremarkable in appearance. The pancreas and bilateral adrenal glands are homogeneous. The kidneys demonstrate symmetric nephrogram and contrast excretion without evidence of hydronephrosis or contour deforming mass. Small and large bowel loops are nondilated. There is no suspicious focal bowel wall thickening. No free fluid or pneumoperitoneum. Normal abdominal aorta. No bulky retroperitoneal adenopathy. The pelvic viscera are unremarkable.      Impression: Normal multiphase contrast-enhanced appearance of the liver without evidence of suspicious focal lesion, heterogeneous enhancement or other parenchymal abnormality to suggest Budd-Chiari syndrome.  Enlarged but otherwise homogeneous spleen measuring up to 15 cm.  No acute findings in the abdomen and pelvis otherwise.   This report was finalized on 7/15/2021 10:54 AM by Manpreet Milton.            I have reviewed the medications:  Scheduled Meds:albuterol sulfate HFA, 2 puff, Inhalation, Q6H - RT  cetirizine, 10 mg, Oral, Daily  doxycycline, 100 mg, Oral, BID  fluticasone, 1 spray, Each Nare, Daily  lidocaine PF 1%, 10 mL, Injection, Once  pantoprazole, 40 mg, Oral, Q AM  predniSONE, 40 mg, Oral, Daily With Breakfast   Followed by  [START ON 7/20/2021] predniSONE, 20 mg, Oral, Daily With Breakfast  sertraline, 100 mg, Oral, Daily  sodium chloride, 10 mL, Intravenous, Q12H      Continuous Infusions:   PRN Meds:.albuterol sulfate HFA  •  amitriptyline  •  promethazine-codeine  •  propranolol  •  sodium chloride    Assessment/Plan   Assessment & Plan     Active Hospital Problems    Diagnosis  POA   • **Splenomegaly [R16.1]  Yes   • Pancytopenia (CMS/HCC) [D61.818]  Yes   • Hypoxia [R09.02]  Yes   • Elevated liver enzymes [R74.8]  Yes   • Depression with anxiety [F41.8]  Yes   • Anxiety [F41.9]  Yes   • Primary insomnia [F51.01]  Yes      Resolved Hospital  Problems   No resolved problems to display.        Brief Hospital Course to date:  Elena Copeland is a 45 y.o. female with hx of GERD, insomnia, anxiety, and depression presents as a direct admission from Pulmonary clinic due to persistent fevers, cough, exertional dyspnea, and intermittent nausea/vomiting x 4 weeks. Had recently returned from vacation to [place] in [place] prior to symptom onset. Had COVID-19 in Dec 2020, did not require hospitalization. She has been evaluated by her PCP and is s/p course of Azithromycin, steroids, and antitussives without improvement in symptoms. Was referred to Pulmonary--CT chest done 7/9/21 showed 5 mm nodule in right lower lobe. Outpatient abdominal ultrasound showed splenomegaly and hepatic steatosis. Labs obtained showed thrombocytopenia and leukopenia, thus prompting direct hospital admission.    Splenomegaly  Thrombocytopenia, leukopenia  --Etiology unclear--hematologic vs autoimmune?  --CT A/P with three phase contrast no acute findings, no Budd-Chiari.   --Bone marrow biopsy done 7/15/21, results pending.  --Hem/Onc following. Peripheral smear is normal. Blastomyces antigen, Bordetella pertussis antibody (elevated IgG and IgM), fungal antibodies/quantitative double immunodiffusion, Histoplasma urine Ag (negative), Quantiferon TB (negative), Aspergillus antibodies, cryptococcal antigen (negative), EBV (elevated EBV IgG, normal IgM), CMV (elevated IgG and IgM), and HIV (negative) labs were obtained during Pulmonary office visit. Beta 2 microglobulin (elevated), Ehrlichia antibody panel, RMSF pending. [id number] pending.  --Her labs are slightly worse today--WBC down to 2.19, platelets down to 130. Hb stable at 9.4.    Hypoxia with bronchospasm  --RA sats 88% at rest on admission.  --CT chest with contrast done at outside facility--no report to review.  --Started on prednisone and Doxycycline.  --On 2 liters currently.     Elevated LFT's  --Fatty infiltration of  liver noted on ultrasound.  --Stable. Monitor.    Anxiety/depression  --Continue Zoloft.     Insomnia  --Continue Amitriptyline.     DVT prophylaxis:  Mechanical DVT prophylaxis orders are present.       Disposition: I expect the patient to be discharged TBD    CODE STATUS:   Code Status and Medical Interventions:   Ordered at: 07/14/21 3354     Level Of Support Discussed With:    Patient     Code Status:    CPR     Medical Interventions (Level of Support Prior to Arrest):    Full       Julieta Harp MD  07/16/21

## 2021-07-16 NOTE — PROGRESS NOTES
HEMATOLOGY/ONCOLOGY PROGRESS NOTE    Subjective      CC: Pancytopenia    SUBJECTIVE:   No acute events overnight.  Patient remains afebrile.  Did note a night sweat last evening.  States that she showered afterwards and had significant fatigue following the shower.  Continues to remain on supplemental oxygen.  Does note some shortness of breath with exertion or talking too much.  Tolerating her diet well at this time        Past Medical History, Past Surgical History, Social History, Family History have been reviewed and are without significant changes except as mentioned.      Medications:  The current medication list was reviewed in the EMR    ALLERGIES:   Allergies   Allergen Reactions   • Eggs Or Egg-Derived Products    • Sudafed [Pseudoephedrine]        ROS:  A comprehensive 10 point review of systems was performed and was negative except as mentioned.      Objective      Vitals:    07/16/21 0501 07/16/21 0633 07/16/21 0748 07/16/21 1224   BP: 143/81 128/82  131/89   BP Location: Right arm Right arm  Right arm   Patient Position: Lying Lying  Lying   Pulse: 75 75  84   Resp: 18 16  16   Temp: 98.4 °F (36.9 °C) 97.5 °F (36.4 °C)     TempSrc: Oral Oral  Oral   SpO2: 96% 97% 96%    Weight: 75.6 kg (166 lb 9.6 oz)      Height:             General: well appearing, in no acute distress  HEENT: sclerae anicteric, oropharynx clear  Lymphatics: no cervical, supraclavicular, inguinal, or axillary adenopathy  Cardiovascular: regular rate and rhythm, no murmurs  Lungs: clear to auscultation bilaterally  Abdomen: soft, nontender, nondistended.  No palpable organomegaly  Extremities: no lower extremity edema  Skin: no rashes, lesions, bruising, or petechiae  Neuro: Alert and oriented x 3. Moves all extremities.    RECENT LABS:    Results from last 7 days   Lab Units 07/16/21  0713 07/15/21  0148 07/14/21  1807   WBC 10*3/mm3 2.19* 3.87 5.10   HEMOGLOBIN g/dL 9.4* 9.5* 10.8*   PLATELETS 10*3/mm3 130* 141 145     Results from  last 7 days   Lab Units 07/16/21  0713 07/15/21  0148 07/14/21  1807   SODIUM mmol/L 136 131* 138   POTASSIUM mmol/L 3.9 3.7 4.1   CO2 mmol/L 22.0 23.0 22.0   BUN mg/dL 11 10 10   CREATININE mg/dL 0.54* 0.53* 0.63   GLUCOSE mg/dL 82 102* 110*     Results from last 7 days   Lab Units 07/16/21  0713 07/15/21  0148 07/14/21  1807   AST (SGOT) U/L 35* 40* 49*   ALT (SGPT) U/L 34* 31 38*   BILIRUBIN mg/dL 0.3 0.4 0.4   ALK PHOS U/L 115 130* 158*         CT Abdomen Pelvis With & Without Contrast    Result Date: 7/15/2021  Normal multiphase contrast-enhanced appearance of the liver without evidence of suspicious focal lesion, heterogeneous enhancement or other parenchymal abnormality to suggest Budd-Chiari syndrome.  Enlarged but otherwise homogeneous spleen measuring up to 15 cm.  No acute findings in the abdomen and pelvis otherwise.   This report was finalized on 7/15/2021 10:54 AM by Manpreet Milton.      US Abdomen Complete    Result Date: 7/9/2021  1. Increased echogenicity throughout the hepatic parenchyma consistent with parenchymal disease process such as hepatic steatosis. No focal liver lesion.  2. Splenomegaly measuring up to 16.2 cm concerning for developing portal hypertension in the setting of hepatic parenchymal process.  3. No ascites.  D:  07/09/2021 E:  07/09/2021     This report was finalized on 7/9/2021 4:44 PM by Dr. Drew Valencia.            Assessment   ASSESSMENT & PLAN:  Ms. Copeland is a 45-year-old lady with past medical history of GERD, insomnia, and anxiety who presents to [facility] with persistent fever and shortness of breath.     Pancytopenia  Transaminitis  Fever of unknown origin  Acute hypoxic respiratory failure  Splenomegaly  -On presentation patient's white count 2.38 hemoglobin 11.8 and a platelet count of 113K  -WBC ranging 3.8 and 5.1 during the admission.  Differential predominantly neutrophilic with decreased lymphocytes  -Count stable today  -Peripheral smear  showing no overt immature cells, blasts, schistocytes  -Uncertain cause of anemia at this time.  Most recent hemoglobin 9.5, likely trending down secondary to blood loss from labs versus possible hemolysis given an elevated LDH and undetectable haptoglobin  -Given her persistent fevers will check an ADAMTS 13 to rule out TTP  -Platelet count stable at 141K  -Elevated ferritin to 1400, triglycerides normal, mild splenomegaly with other increased inflammatory markers as well as fever of unknown origin could be concerning for HLH versus autoimmune process.  -Bone marrow biopsy completed on 7/15/2021.  Pathology pending  -We will rule out tickborne diseases given her leukopenia, fevers, elevated LFTs  -HIV, hepatitis, HSV, Lyme antibodies, RPR, rheumatoid factor, ALMAS within normal limits  -Would continue doxycycline and steroids at this time     Thank you for the consult.  We will continue to follow while inpatient.  Plan for close outpatient follow-up when she is appropriate for discharge    Rock Lam MD  Hematology and Oncology    7/16/2021  12:42 EDT

## 2021-07-17 ENCOUNTER — APPOINTMENT (OUTPATIENT)
Dept: CT IMAGING | Facility: HOSPITAL | Age: 46
End: 2021-07-17

## 2021-07-17 LAB
ALBUMIN SERPL-MCNC: 2.9 G/DL (ref 3.5–5.2)
ALBUMIN/GLOB SERPL: 0.9 G/DL
ALP SERPL-CCNC: 118 U/L (ref 39–117)
ALT SERPL W P-5'-P-CCNC: 37 U/L (ref 1–33)
ANION GAP SERPL CALCULATED.3IONS-SCNC: 9 MMOL/L (ref 5–15)
AST SERPL-CCNC: 29 U/L (ref 1–32)
BASOPHILS # BLD AUTO: 0.01 10*3/MM3 (ref 0–0.2)
BASOPHILS NFR BLD AUTO: 0.5 % (ref 0–1.5)
BILIRUB SERPL-MCNC: 0.3 MG/DL (ref 0–1.2)
BUN SERPL-MCNC: 8 MG/DL (ref 6–20)
BUN/CREAT SERPL: 13.8 (ref 7–25)
CALCIUM SPEC-SCNC: 8.4 MG/DL (ref 8.6–10.5)
CHLORIDE SERPL-SCNC: 102 MMOL/L (ref 98–107)
CO2 SERPL-SCNC: 25 MMOL/L (ref 22–29)
CREAT SERPL-MCNC: 0.58 MG/DL (ref 0.57–1)
D DIMER PPP FEU-MCNC: 2.52 MCGFEU/ML (ref 0–0.56)
DEPRECATED RDW RBC AUTO: 45.3 FL (ref 37–54)
EOSINOPHIL # BLD AUTO: 0.02 10*3/MM3 (ref 0–0.4)
EOSINOPHIL NFR BLD AUTO: 0.9 % (ref 0.3–6.2)
ERYTHROCYTE [DISTWIDTH] IN BLOOD BY AUTOMATED COUNT: 14.2 % (ref 12.3–15.4)
GFR SERPL CREATININE-BSD FRML MDRD: 113 ML/MIN/1.73
GLOBULIN UR ELPH-MCNC: 3.1 GM/DL
GLUCOSE SERPL-MCNC: 91 MG/DL (ref 65–99)
HCT VFR BLD AUTO: 31.6 % (ref 34–46.6)
HGB BLD-MCNC: 10.3 G/DL (ref 12–15.9)
IMM GRANULOCYTES # BLD AUTO: 0.03 10*3/MM3 (ref 0–0.05)
IMM GRANULOCYTES NFR BLD AUTO: 1.4 % (ref 0–0.5)
KAPPA LC FREE SER-MCNC: 31.1 MG/L (ref 3.3–19.4)
KAPPA LC FREE/LAMBDA FREE SER: 0.85 {RATIO} (ref 0.26–1.65)
LAMBDA LC FREE SERPL-MCNC: 36.4 MG/L (ref 5.7–26.3)
LYMPHOCYTES # BLD AUTO: 0.43 10*3/MM3 (ref 0.7–3.1)
LYMPHOCYTES NFR BLD AUTO: 19.5 % (ref 19.6–45.3)
MCH RBC QN AUTO: 29.3 PG (ref 26.6–33)
MCHC RBC AUTO-ENTMCNC: 32.6 G/DL (ref 31.5–35.7)
MCV RBC AUTO: 90 FL (ref 79–97)
MONOCYTES # BLD AUTO: 0.28 10*3/MM3 (ref 0.1–0.9)
MONOCYTES NFR BLD AUTO: 12.7 % (ref 5–12)
NEUTROPHILS NFR BLD AUTO: 1.43 10*3/MM3 (ref 1.7–7)
NEUTROPHILS NFR BLD AUTO: 65 % (ref 42.7–76)
NRBC BLD AUTO-RTO: 0 /100 WBC (ref 0–0.2)
NT-PROBNP SERPL-MCNC: 442.7 PG/ML (ref 0–450)
PLATELET # BLD AUTO: 154 10*3/MM3 (ref 140–450)
PMV BLD AUTO: 10.3 FL (ref 6–12)
POTASSIUM SERPL-SCNC: 4.1 MMOL/L (ref 3.5–5.2)
PROT SERPL-MCNC: 6 G/DL (ref 6–8.5)
R RICKETTSI IGG SER QL IA: NEGATIVE
R RICKETTSI IGM SER-ACNC: 0.17 INDEX (ref 0–0.89)
RBC # BLD AUTO: 3.51 10*6/MM3 (ref 3.77–5.28)
SODIUM SERPL-SCNC: 136 MMOL/L (ref 136–145)
TROPONIN T SERPL-MCNC: <0.01 NG/ML (ref 0–0.03)
WBC # BLD AUTO: 2.2 10*3/MM3 (ref 3.4–10.8)

## 2021-07-17 PROCEDURE — 63710000001 PREDNISONE PER 1 MG: Performed by: INTERNAL MEDICINE

## 2021-07-17 PROCEDURE — 71275 CT ANGIOGRAPHY CHEST: CPT

## 2021-07-17 PROCEDURE — 85025 COMPLETE CBC W/AUTO DIFF WBC: CPT | Performed by: INTERNAL MEDICINE

## 2021-07-17 PROCEDURE — 99233 SBSQ HOSP IP/OBS HIGH 50: CPT | Performed by: INTERNAL MEDICINE

## 2021-07-17 PROCEDURE — 85379 FIBRIN DEGRADATION QUANT: CPT | Performed by: INTERNAL MEDICINE

## 2021-07-17 PROCEDURE — 99232 SBSQ HOSP IP/OBS MODERATE 35: CPT | Performed by: INTERNAL MEDICINE

## 2021-07-17 PROCEDURE — 84484 ASSAY OF TROPONIN QUANT: CPT | Performed by: INTERNAL MEDICINE

## 2021-07-17 PROCEDURE — 83880 ASSAY OF NATRIURETIC PEPTIDE: CPT | Performed by: INTERNAL MEDICINE

## 2021-07-17 PROCEDURE — 0 IOPAMIDOL PER 1 ML: Performed by: INTERNAL MEDICINE

## 2021-07-17 PROCEDURE — 93005 ELECTROCARDIOGRAM TRACING: CPT | Performed by: INTERNAL MEDICINE

## 2021-07-17 PROCEDURE — 80053 COMPREHEN METABOLIC PANEL: CPT | Performed by: INTERNAL MEDICINE

## 2021-07-17 RX ORDER — ALBUTEROL SULFATE 90 UG/1
2 AEROSOL, METERED RESPIRATORY (INHALATION) EVERY 6 HOURS PRN
Status: DISCONTINUED | OUTPATIENT
Start: 2021-07-17 | End: 2021-07-18 | Stop reason: HOSPADM

## 2021-07-17 RX ORDER — PREDNISONE 20 MG/1
20 TABLET ORAL
Status: DISCONTINUED | OUTPATIENT
Start: 2021-07-18 | End: 2021-07-18 | Stop reason: HOSPADM

## 2021-07-17 RX ORDER — ALPRAZOLAM 0.25 MG/1
0.25 TABLET ORAL ONCE
Status: COMPLETED | OUTPATIENT
Start: 2021-07-17 | End: 2021-07-17

## 2021-07-17 RX ORDER — FERROUS SULFATE 325(65) MG
325 TABLET ORAL
Status: DISCONTINUED | OUTPATIENT
Start: 2021-07-17 | End: 2021-07-18 | Stop reason: HOSPADM

## 2021-07-17 RX ADMIN — PANTOPRAZOLE SODIUM 40 MG: 40 TABLET, DELAYED RELEASE ORAL at 06:49

## 2021-07-17 RX ADMIN — CETIRIZINE HYDROCHLORIDE 10 MG: 10 TABLET, FILM COATED ORAL at 09:07

## 2021-07-17 RX ADMIN — ALPRAZOLAM 0.25 MG: 0.25 TABLET ORAL at 09:07

## 2021-07-17 RX ADMIN — SODIUM CHLORIDE, PRESERVATIVE FREE 10 ML: 5 INJECTION INTRAVENOUS at 09:08

## 2021-07-17 RX ADMIN — DOXYCYCLINE 100 MG: 100 CAPSULE ORAL at 09:07

## 2021-07-17 RX ADMIN — AMITRIPTYLINE HYDROCHLORIDE 50 MG: 50 TABLET, FILM COATED ORAL at 21:28

## 2021-07-17 RX ADMIN — IOPAMIDOL 75 ML: 755 INJECTION, SOLUTION INTRAVENOUS at 14:59

## 2021-07-17 RX ADMIN — PREDNISONE 40 MG: 20 TABLET ORAL at 09:06

## 2021-07-17 RX ADMIN — FLUTICASONE PROPIONATE 1 SPRAY: 50 SPRAY, METERED NASAL at 09:07

## 2021-07-17 RX ADMIN — SERTRALINE HYDROCHLORIDE 100 MG: 100 TABLET ORAL at 09:07

## 2021-07-17 RX ADMIN — SODIUM CHLORIDE, PRESERVATIVE FREE 10 ML: 5 INJECTION INTRAVENOUS at 21:30

## 2021-07-17 RX ADMIN — FERROUS SULFATE TAB 325 MG (65 MG ELEMENTAL FE) 325 MG: 325 (65 FE) TAB at 13:28

## 2021-07-17 NOTE — PROGRESS NOTES
HEMATOLOGY/ONCOLOGY PROGRESS NOTE    Subjective      CC: Pancytopenia, fever of unknown origin    SUBJECTIVE:   No acute events overnight.  Patient resting comfortably in bed this morning.  States that she was having more anxiety this morning took an as needed Xanax.  States that she is tired from this.  Continues to require oxygen by nasal cannula to keep her oxygen saturations up.  States she is tolerating her diet well        Past Medical History, Past Surgical History, Social History, Family History have been reviewed and are without significant changes except as mentioned.      Medications:  The current medication list was reviewed in the EMR    ALLERGIES:   Allergies   Allergen Reactions   • Eggs Or Egg-Derived Products    • Sudafed [Pseudoephedrine]        ROS:  A comprehensive 10 point review of systems was performed and was negative except as mentioned.      Objective      Vitals:    07/16/21 1934 07/16/21 2330 07/17/21 0350 07/17/21 0750   BP:  145/100 158/100 140/95   BP Location:  Right arm Right arm Right arm   Patient Position:  Lying Lying Lying   Pulse:  71 67 77   Resp: 18 18 18 18   Temp:  98.4 °F (36.9 °C) 98.2 °F (36.8 °C) 98.3 °F (36.8 °C)   TempSrc:  Oral Oral Oral   SpO2:  97% 96% 96%   Weight:       Height:          General: Laying in bed comfortably, in no acute distress  HEENT: sclerae anicteric, oropharynx clear  Lymphatics: no cervical, supraclavicular, inguinal, or axillary adenopathy  Cardiovascular: regular rate and rhythm, no murmurs  Lungs: clear to auscultation bilaterally  Abdomen: soft, nontender, nondistended.  No palpable organomegaly  Extremities: no lower extremity edema  Skin: no rashes, lesions, bruising, or petechiae  Neuro: Alert and oriented x 3. Moves all extremities.    RECENT LABS:    Results from last 7 days   Lab Units 07/17/21  0631 07/16/21  0713 07/15/21  0148   WBC 10*3/mm3 2.20* 2.19* 3.87   HEMOGLOBIN g/dL 10.3* 9.4* 9.5*   PLATELETS 10*3/mm3 154 130* 141      Results from last 7 days   Lab Units 07/17/21  0631 07/16/21  0713 07/15/21  0148   SODIUM mmol/L 136 136 131*   POTASSIUM mmol/L 4.1 3.9 3.7   CO2 mmol/L 25.0 22.0 23.0   BUN mg/dL 8 11 10   CREATININE mg/dL 0.58 0.54* 0.53*   GLUCOSE mg/dL 91 82 102*     Results from last 7 days   Lab Units 07/17/21  0631 07/16/21  0713 07/15/21  0148   AST (SGOT) U/L 29 35* 40*   ALT (SGPT) U/L 37* 34* 31   BILIRUBIN mg/dL 0.3 0.3 0.4   ALK PHOS U/L 118* 115 130*         CT Abdomen Pelvis With & Without Contrast    Result Date: 7/15/2021  Normal multiphase contrast-enhanced appearance of the liver without evidence of suspicious focal lesion, heterogeneous enhancement or other parenchymal abnormality to suggest Budd-Chiari syndrome.  Enlarged but otherwise homogeneous spleen measuring up to 15 cm.  No acute findings in the abdomen and pelvis otherwise.   This report was finalized on 7/15/2021 10:54 AM by Manpreet Milton.      CT Bone marrow biopsy and aspiration    Result Date: 7/16/2021  Successful CT-guided bone marrow biopsy as above.  This report was finalized on 7/16/2021 6:45 PM by Angélica Tripp.            Assessment   ASSESSMENT & PLAN:  Ms. Copeland is a 45-year-old lady with past medical history of GERD, insomnia, and anxiety who presents to  with persistent fever and shortness of breath.     Pancytopenia  Transaminitis  Fever of unknown origin  Acute hypoxic respiratory failure  Splenomegaly  -On presentation patient's white count 2.38 hemoglobin 11.8 and a platelet count of 113K  -WBC ranging 2.1 and 5.1 during the admission.  Differential predominantly neutrophilic with decreased lymphocytes  -Peripheral smear showing no overt immature cells, blasts, schistocytes  -Repeat haptoglobin 42, low concern for hemolysis at this time  -ADAMTS 13 pending to rule out TTP given her persistent fevers and initially elevated LDH and low haptoglobin  -Elevated ferritin to 1400, triglycerides normal, mild  splenomegaly with other increased inflammatory markers as well as fever of unknown origin could be concerning for HLH versus autoimmune process.  -Bone marrow biopsy completed on 7/15/2021.  Pathology showing no evidence of malignancy including leukemia or lymphoma.  Did note 5% noncaseating granulomas which could be related to inflammation or infection  -Extensive infectious and rheumatologic work-up negative thus far  -Would continue doxycycline and steroids at this time  -At this time, patient does not have an active hematologic malignancy causing her symptoms.  Recommend further infectious disease and rheumatologic/autoimmune work-up  -Would consider infectious disease and rheumatology consult    Iron deficiency anemia  -Patient noted to have mild iron deficiency on initial labs  -Bone marrow biopsy showing low iron stores  -We will start ferrous sulfate 325 mg today     Thank you for the consult.  We will continue to follow while inpatient.  Plan for close outpatient follow-up when she is appropriate for discharge    Rock Lam MD  Hematology and Oncology    7/17/2021  11:06 EDT

## 2021-07-17 NOTE — PROGRESS NOTES
[facility] Medicine Services  PROGRESS NOTE    Patient Name: Elena Copeland  : 1975  MRN: [id number]    Date of Admission: 2021  Primary Care Physician: Jenn Mcclellan APRN    Subjective   Subjective     CC:  F/U fevers, cough    HPI:  Patientmoved overnight to a new room. Had a spell of anxiousness this morning, Still requiring oxygen , less wheezing    ROS:  Gen--fevers, +chills  CV-no chest pain, no palpitations  Resp-+cough, +intermittent dyspnea with any exertion  GI-no N/V/D, no abd pain    All other systems reviewed and negative except any additional pertinent positives and negatives as discussed in HPI.      Objective   Objective     Vital Signs:   Temp:  [97.8 °F (36.6 °C)-98.9 °F (37.2 °C)] 98.3 °F (36.8 °C)  Heart Rate:  [67-89] 77  Resp:  [16-18] 18  BP: (113-158)/() 140/95  Flow (L/min):  [2] 2     Physical Exam:  Patient is alert and talkative was sleeping  Neck is without mass or JVD  Heart is Reg wo murmur  Lungs are unlabored, shallow, no wheezing  Abd is soft without HSM or mass, not tender or distended  MAEW  Skin is without rash  Neurologic exam in nonfocal   Mood is appropriate    Results Reviewed:  LAB RESULTS:      Lab 21  0844 21  0631 21  0713 07/15/21  0148 21  1906 21  1807 21  1602   WBC  --  2.20* 2.19* 3.87  --  5.10 2.38*   HEMOGLOBIN  --  10.3* 9.4* 9.5*  --  10.8* 11.8*   HEMATOCRIT  --  31.6* 28.5* 28.8*  --  32.1* 35.3   PLATELETS  --  154 130* 141  --  145 113*   NEUTROS ABS  --  1.43* 1.43* 2.87  --  3.91 1.99   IMMATURE GRANS (ABS)  --  0.03 0.02 0.04  --  0.06*  --    LYMPHS ABS  --  0.43* 0.39* 0.54*  --  0.65*  --    MONOS ABS  --  0.28 0.32 0.42  --  0.47  --    EOS ABS  --  0.02 0.03 0.00  --  0.00  --    MCV  --  90.0 90.2 86.7  --  89.7 86.1   SED RATE  --   --   --   --   --   --  24*   CRP  --   --   --   --   --   --  7.71*   PROCALCITONIN  --   --   --   --   --  0.14 0.23    LDH  --   --   --   --   --  623* 709*   PROTIME  --   --   --   --  13.9  --   --    D DIMER QUANT 2.52*  --   --  4.51*  --   --   --          Lab 07/17/21  0631 07/16/21  0713 07/15/21  0148 07/14/21 1807 07/13/21  1602   SODIUM 136 136 131* 138 132*   POTASSIUM 4.1 3.9 3.7 4.1 3.8   CHLORIDE 102 104 100 104 95*   CO2 25.0 22.0 23.0 22.0 24.6   ANION GAP 9.0 10.0 8.0 12.0 12.4   BUN 8 11 10 10 8   CREATININE 0.58 0.54* 0.53* 0.63 0.71   GLUCOSE 91 82 102* 110* 83   CALCIUM 8.4* 8.1* 8.2* 8.3* 8.7         Lab 07/17/21  0631 07/16/21  0713 07/15/21  0148 07/14/21  1807 07/13/21  1602   TOTAL PROTEIN 6.0 5.4* 5.6* 6.4 6.6   ALBUMIN 2.90* 2.60* 2.60* 3.00* 3.10*   GLOBULIN 3.1 2.8 3.0 3.4 3.5   ALT (SGPT) 37* 34* 31 38* 48*   AST (SGOT) 29 35* 40* 49* 62*   BILIRUBIN 0.3 0.3 0.4 0.4 0.4   ALK PHOS 118* 115 130* 158* 153*         Lab 07/17/21  0631 07/14/21  1906   PROBNP 442.7  --    TROPONIN T <0.010  --    PROTIME  --  13.9   INR  --  1.10         Lab 07/14/21  1807   TRIGLYCERIDES 135         Lab 07/15/21  1343 07/14/21 1807   IRON 45  --    IRON SATURATION 16*  --    TIBC 279*  --    TRANSFERRIN 187*  --    FERRITIN  --  1,392.00*         Brief Urine Lab Results  (Last result in the past 365 days)      Color   Clarity   Blood   Leuk Est   Nitrite   Protein   CREAT   Urine HCG        07/07/21 1419 Dark Yellow Cloudy Negative Small (1+) Negative 30 mg/dL (1+)               Microbiology Results Abnormal     Procedure Component Value - Date/Time    Body Fluid Culture - Body Fluid, Iliac Crest, Right - Aspirate [[id number]] Collected: 07/15/21 1101    Lab Status: Preliminary result Specimen: Body Fluid from Iliac Crest, Right - Aspirate Updated: 07/17/21 0910     Body Fluid Culture No growth at 2 days     Gram Stain No organisms seen    Fungus Smear - Aspirate, Iliac Crest, Right - Aspirate [[id number]] Collected: 07/15/21 1101    Lab Status: Final result Specimen: Iliac Crest, Right - Aspirate Updated: 07/16/21  1455     Fungal Stain No fungal elements seen    AFB Culture - Aspirate, Iliac Crest, Right - Aspirate [[id number]] Collected: 07/15/21 1100    Lab Status: Preliminary result Specimen: Iliac Crest, Right - Aspirate Updated: 07/16/21 1122     AFB Stain No acid fast bacilli seen on direct smear    COVID PRE-OP / PRE-PROCEDURE SCREENING ORDER (NO ISOLATION) - Swab, Nasopharynx [[id number]]  (Normal) Collected: 07/14/21 1852    Lab Status: Final result Specimen: Swab from Nasopharynx Updated: 07/14/21 1953    Narrative:      The following orders were created for panel order COVID PRE-OP / PRE-PROCEDURE SCREENING ORDER (NO ISOLATION) - Swab, Nasopharynx.  Procedure                               Abnormality         Status                     ---------                               -----------         ------                     COVID-19,CEPHEID,BROOKE IN-...[[id number]]  Normal              Final result                 Please view results for these tests on the individual orders.    COVID-19,CEPHEID,BROOKE IN-HOUSE(OR EMERGENT/ADD-ON),NP SWAB IN TRANSPORT MEDIA 3-4 HR TAT - Swab, Nasopharynx [[id number]]  (Normal) Collected: 07/14/21 1852    Lab Status: Final result Specimen: Swab from Nasopharynx Updated: 07/14/21 1953     COVID19 Not Detected    Narrative:      Fact sheet for providers: https://www.fda.gov/media/816523/download     Fact sheet for patients: https://www.fda.gov/media/720361/download  Fact sheet for providers: https://www.fda.gov/media/717399/download     Fact sheet for patients: https://www.fda.gov/media/753285/download          No radiology results from the last 24 hrs        I have reviewed the medications:  Scheduled Meds:cetirizine, 10 mg, Oral, Daily  ferrous sulfate, 325 mg, Oral, Daily With Breakfast  fluticasone, 1 spray, Each Nare, Daily  lidocaine PF 1%, 10 mL, Injection, Once  pantoprazole, 40 mg, Oral, Q AM  predniSONE, 40 mg, Oral, Daily With Breakfast   Followed by  [START ON 7/20/2021] predniSONE, 20  mg, Oral, Daily With Breakfast  sertraline, 100 mg, Oral, Daily  sodium chloride, 10 mL, Intravenous, Q12H      Continuous Infusions:   PRN Meds:.•  albuterol sulfate HFA  •  albuterol sulfate HFA  •  amitriptyline  •  promethazine-codeine  •  propranolol  •  sodium chloride    Assessment/Plan   Assessment & Plan     Active Hospital Problems    Diagnosis  POA   • **Splenomegaly [R16.1]  Yes   • Pancytopenia (CMS/HCC) [D61.818]  Yes   • Hypoxia [R09.02]  Yes   • Elevated liver enzymes [R74.8]  Yes   • Depression with anxiety [F41.8]  Yes   • Anxiety [F41.9]  Yes   • Primary insomnia [F51.01]  Yes      Resolved Hospital Problems   No resolved problems to display.        Brief Hospital Course to date:  Elena Copeland is a 45 y.o. female with hx of GERD, insomnia, anxiety, and depression presents as a direct admission from Pulmonary clinic due to persistent fevers, cough, exertional dyspnea, and intermittent nausea/vomiting x 4 weeks. Had recently returned from vacation to [facility] in [place] prior to symptom onset. Had COVID-19 in Dec 2020, did not require hospitalization. She has been evaluated by her PCP and is s/p course of Azithromycin, steroids, and antitussives without improvement in symptoms. Was referred to Pulmonary--CT chest done 7/9/21 showed 5 mm nodule in right lower lobe. Outpatient abdominal ultrasound showed splenomegaly and hepatic steatosis. Labs obtained showed thrombocytopenia and leukopenia, thus prompting direct hospital admission.    Splenomegaly  Thrombocytopenia, leukopenia  --Etiology unclear--hematologic vs autoimmune?  --CT A/P with three phase contrast no acute findings, no Budd-Chiari.   --Bone marrow biopsy done 7/15/21, results pending.  --Hem/Onc following. Peripheral smear is normal. Blastomyces antigen, Bordetella pertussis antibody (elevated IgG and IgM), fungal antibodies/quantitative double immunodiffusion, Histoplasma urine Ag (negative), Quantiferon TB (negative),  Aspergillus antibodies, cryptococcal antigen (negative), EBV (elevated EBV IgG, normal IgM), CMV (elevated IgG and IgM), and HIV (negative) labs were obtained during Pulmonary office visit. Beta 2 microglobulin (elevated), Ehrlichia antibody panel, RMSF pending. [id number] pending.  --Her labs are slightly worse today--WBC down to 2.19, platelets down to 130. Hb stable at 9.4.    Hypoxia with bronchospasm  --RA sats 88% at rest on admission.  --CT chest with contrast done at outside facility--no report to review.  --wean prednisone and Doxycycline.  --On 2 liters currently.   --check CTA  As ddimer persistently elevated    Elevated LFT's  --Fatty infiltration of liver noted on ultrasound.  --Stable. Monitor.    Anxiety/depression  --Continue Zoloft.   S/p xanax 0.25mg x 1    Insomnia  --Continue Amitriptyline.     DVT prophylaxis:  Mechanical DVT prophylaxis orders are present.       Disposition: I expect the patient to be discharged TBD    CODE STATUS:   Code Status and Medical Interventions:   Ordered at: 07/14/21 1359     Level Of Support Discussed With:    Patient     Code Status:    CPR     Medical Interventions (Level of Support Prior to Arrest):    Full       Gilma Joyner MD  07/17/21

## 2021-07-18 ENCOUNTER — READMISSION MANAGEMENT (OUTPATIENT)
Dept: CALL CENTER | Facility: HOSPITAL | Age: 46
End: 2021-07-18

## 2021-07-18 VITALS
RESPIRATION RATE: 18 BRPM | HEIGHT: 65 IN | TEMPERATURE: 97.9 F | WEIGHT: 186 LBS | HEART RATE: 95 BPM | BODY MASS INDEX: 30.99 KG/M2 | OXYGEN SATURATION: 91 % | SYSTOLIC BLOOD PRESSURE: 121 MMHG | DIASTOLIC BLOOD PRESSURE: 83 MMHG

## 2021-07-18 LAB
A FLAVUS AB SER QL ID: NEGATIVE
A FLAVUS AB SER QL ID: NEGATIVE
A FUMIGATUS AB SER QL ID: NEGATIVE
A FUMIGATUS AB SER QL ID: NEGATIVE
A NIGER AB SER QL ID: NEGATIVE
A NIGER AB SER QL ID: NEGATIVE
ALBUMIN SERPL-MCNC: 3 G/DL (ref 3.5–5.2)
ALBUMIN/GLOB SERPL: 1 G/DL
ALP SERPL-CCNC: 114 U/L (ref 39–117)
ALT SERPL W P-5'-P-CCNC: 43 U/L (ref 1–33)
ANION GAP SERPL CALCULATED.3IONS-SCNC: 11 MMOL/L (ref 5–15)
AST SERPL-CCNC: 31 U/L (ref 1–32)
B DERMAT AB TITR SER: NEGATIVE {TITER}
BASOPHILS # BLD AUTO: 0.01 10*3/MM3 (ref 0–0.2)
BASOPHILS NFR BLD AUTO: 0.4 % (ref 0–1.5)
BILIRUB SERPL-MCNC: 0.3 MG/DL (ref 0–1.2)
BUN SERPL-MCNC: 14 MG/DL (ref 6–20)
BUN/CREAT SERPL: 22.2 (ref 7–25)
CALCIUM SPEC-SCNC: 9 MG/DL (ref 8.6–10.5)
CHLORIDE SERPL-SCNC: 100 MMOL/L (ref 98–107)
CO2 SERPL-SCNC: 24 MMOL/L (ref 22–29)
CREAT SERPL-MCNC: 0.63 MG/DL (ref 0.57–1)
DEPRECATED RDW RBC AUTO: 44.8 FL (ref 37–54)
EOSINOPHIL # BLD AUTO: 0.02 10*3/MM3 (ref 0–0.4)
EOSINOPHIL NFR BLD AUTO: 0.8 % (ref 0.3–6.2)
ERYTHROCYTE [DISTWIDTH] IN BLOOD BY AUTOMATED COUNT: 14.1 % (ref 12.3–15.4)
GFR SERPL CREATININE-BSD FRML MDRD: 103 ML/MIN/1.73
GLOBULIN UR ELPH-MCNC: 3 GM/DL
GLUCOSE SERPL-MCNC: 94 MG/DL (ref 65–99)
HCT VFR BLD AUTO: 32.3 % (ref 34–46.6)
HGB BLD-MCNC: 10.7 G/DL (ref 12–15.9)
IMM GRANULOCYTES # BLD AUTO: 0.03 10*3/MM3 (ref 0–0.05)
IMM GRANULOCYTES NFR BLD AUTO: 1.2 % (ref 0–0.5)
LYMPHOCYTES # BLD AUTO: 0.43 10*3/MM3 (ref 0.7–3.1)
LYMPHOCYTES NFR BLD AUTO: 16.7 % (ref 19.6–45.3)
MCH RBC QN AUTO: 29.8 PG (ref 26.6–33)
MCHC RBC AUTO-ENTMCNC: 33.1 G/DL (ref 31.5–35.7)
MCV RBC AUTO: 90 FL (ref 79–97)
MONOCYTES # BLD AUTO: 0.37 10*3/MM3 (ref 0.1–0.9)
MONOCYTES NFR BLD AUTO: 14.3 % (ref 5–12)
NEUTROPHILS NFR BLD AUTO: 1.72 10*3/MM3 (ref 1.7–7)
NEUTROPHILS NFR BLD AUTO: 66.6 % (ref 42.7–76)
NRBC BLD AUTO-RTO: 0 /100 WBC (ref 0–0.2)
PLATELET # BLD AUTO: 158 10*3/MM3 (ref 140–450)
PMV BLD AUTO: 10.5 FL (ref 6–12)
POTASSIUM SERPL-SCNC: 4 MMOL/L (ref 3.5–5.2)
PROT SERPL-MCNC: 6 G/DL (ref 6–8.5)
RBC # BLD AUTO: 3.59 10*6/MM3 (ref 3.77–5.28)
SODIUM SERPL-SCNC: 135 MMOL/L (ref 136–145)
WBC # BLD AUTO: 2.58 10*3/MM3 (ref 3.4–10.8)

## 2021-07-18 PROCEDURE — 63710000001 PREDNISONE PER 1 MG: Performed by: INTERNAL MEDICINE

## 2021-07-18 PROCEDURE — 85025 COMPLETE CBC W/AUTO DIFF WBC: CPT | Performed by: INTERNAL MEDICINE

## 2021-07-18 PROCEDURE — 80053 COMPREHEN METABOLIC PANEL: CPT | Performed by: INTERNAL MEDICINE

## 2021-07-18 PROCEDURE — 99239 HOSP IP/OBS DSCHRG MGMT >30: CPT | Performed by: INTERNAL MEDICINE

## 2021-07-18 RX ORDER — RIBOFLAVIN (VITAMIN B2) 100 MG
250 TABLET ORAL DAILY
Qty: 30 TABLET | Refills: 1 | Status: SHIPPED | OUTPATIENT
Start: 2021-07-18

## 2021-07-18 RX ORDER — DOXYCYCLINE HYCLATE 50 MG/1
324 CAPSULE, GELATIN COATED ORAL
Qty: 30 TABLET | Refills: 1 | Status: SHIPPED | OUTPATIENT
Start: 2021-07-18

## 2021-07-18 RX ORDER — PREDNISONE 20 MG/1
TABLET ORAL
Qty: 21 TABLET | Refills: 1 | Status: SHIPPED | OUTPATIENT
Start: 2021-07-18 | End: 2021-07-30 | Stop reason: HOSPADM

## 2021-07-18 RX ADMIN — SODIUM CHLORIDE, PRESERVATIVE FREE 10 ML: 5 INJECTION INTRAVENOUS at 09:49

## 2021-07-18 RX ADMIN — CETIRIZINE HYDROCHLORIDE 10 MG: 10 TABLET, FILM COATED ORAL at 09:48

## 2021-07-18 RX ADMIN — PANTOPRAZOLE SODIUM 40 MG: 40 TABLET, DELAYED RELEASE ORAL at 06:18

## 2021-07-18 RX ADMIN — FERROUS SULFATE TAB 325 MG (65 MG ELEMENTAL FE) 325 MG: 325 (65 FE) TAB at 09:48

## 2021-07-18 RX ADMIN — PREDNISONE 20 MG: 20 TABLET ORAL at 09:49

## 2021-07-18 RX ADMIN — SERTRALINE HYDROCHLORIDE 100 MG: 100 TABLET ORAL at 09:48

## 2021-07-18 RX ADMIN — FLUTICASONE PROPIONATE 1 SPRAY: 50 SPRAY, METERED NASAL at 09:49

## 2021-07-18 NOTE — OUTREACH NOTE
Prep Survey      Responses   Restoration facility patient discharged from?  [facility]   Is LACE score < 7 ?  No   Emergency Room discharge w/ pulse ox?  No   Eligibility  [facility]   Date of Admission  07/14/21   Date of Discharge  07/18/21   Discharge Disposition  Home or Self Care   Discharge diagnosis  splenomegaly, pancytopenia, hypoxia with brohchospasm   Does the patient have one of the following disease processes/diagnoses(primary or secondary)?  Other   Does the patient have Home health ordered?  No   Is there a DME ordered?  No   Prep survey completed?  Yes          Jody Vo RN

## 2021-07-18 NOTE — CASE MANAGEMENT/SOCIAL WORK
Continued Stay Note  [facility]     Patient Name: Elena Copeland  MRN: [id number]  Today's Date: 7/18/2021    Admit Date: 7/14/2021    Discharge Plan     Row Name 07/18/21 1211       Plan    Plan  Home    Patient/Family in Agreement with Plan  yes    Plan Comments  Spoke with patient over the phone.  She denied dc needs, family will transport.    Final Discharge Disposition Code  01 - home or self-care        Discharge Codes    No documentation.       Expected Discharge Date and Time     Expected Discharge Date Expected Discharge Time    Jul 18, 2021             Kylee Monet RN

## 2021-07-18 NOTE — DISCHARGE SUMMARY
[facility] Medicine Services  DISCHARGE SUMMARY    Patient Name: Elena Copeland  : 1975  MRN: [id number]    Date of Admission: 2021  5:09 PM  Date of Discharge: 21    Primary Care Physician: Jenn Mcclellan APRN    Consults     Date and Time Order Name Status Description    7/15/2021 12:35 AM Inpatient Hematology & Oncology Consult Completed           Hospital Course     Presenting Problem:   Splenomegaly [R16.1]  Pancytopenia (CMS/HCC) [D61.818]    Active Hospital Problems    Diagnosis  POA   • **Splenomegaly [R16.1]  Yes   • Pancytopenia (CMS/HCC) [D61.818]  Yes   • Hypoxia [R09.02]  Yes   • Elevated liver enzymes [R74.8]  Yes   • Depression with anxiety [F41.8]  Yes   • Anxiety [F41.9]  Yes   • Primary insomnia [F51.01]  Yes      Resolved Hospital Problems   No resolved problems to display.          Hospital Course:  Elena Copeland is a 45 y.o. female with hx of GERD, insomnia, anxiety, and depression presents as a direct admission from Pulmonary clinic due to persistent fevers, cough, exertional dyspnea, and intermittent nausea/vomiting x 4 weeks. Had recently returned from vacation to [facility] in [place] prior to symptom onset. Had COVID-19 in Dec 2020, did not require hospitalization. She has been evaluated by her PCP and is s/p course of Azithromycin, steroids, and antitussives without improvement in symptoms. Was referred to Pulmonary--CT chest done 21 showed 5 mm nodule in right lower lobe.   Outpatient abdominal ultrasound showed splenomegaly and hepatic steatosis. Labs obtained showed thrombocytopenia and leukopenia, thus prompting direct hospital admission 21.   No Conclusive diagnosis identified after HemeOnc consults  Possible late pertussis or viral syndrome..  If persists, would see [facility] in clinic or Rheumatology    Splenomegaly  Thrombocytopenia, leukopenia  --Etiology unclear--hematologic vs autoimmune?  --CT A/P with three  phase contrast no acute findings, no Budd-Chiari.   --Bone marrow biopsy done 7/15/21, results pending.  --Hem/Onc following. Peripheral smear is normal. Blastomyces antigen, Bordetella pertussis antibody (elevated IgG and IgM), fungal antibodies/quantitative double immunodiffusion, Histoplasma urine Ag (negative), Quantiferon TB (negative), Aspergillus antibodies, cryptococcal antigen (negative), EBV (elevated EBV IgG, normal IgM), CMV (elevated IgG and IgM), and HIV (negative) labs were obtained during Pulmonary office visit. Beta 2 microglobulin (elevated), Ehrlichia antibody panel, RMSF pending. [id number] pending.  --Her labs are slightly worse today--WBC down to 2.19, platelets down to 130. Hb stable at 9.4.     Hypoxia with bronchospasm  --RA sats 88% at rest on admission.  --CT chest with contrast done at outside facility--no report to review.  --wean prednisone and Doxycycline.  --On 2 liters currently.   --check CTA  As ddimer persistently elevated     Elevated LFT's  --Fatty infiltration of liver noted on ultrasound.  --Stable. Monitor.     Anxiety/depression  --Continue Zoloft.   S/p xanax 0.25mg x 1     Insomnia  --Continue Amitriptyline.      Need to continue to monitor counts and pulse ox at home  Splenomegaly  Thrombocytopenia, leukopenia  --Etiology unclear--hematologic vs autoimmune?  --CT A/P with three phase contrast no acute findings, no Budd-Chiari.   --Bone marrow biopsy done 7/15/21, results pending.  --Hem/Onc following. Peripheral smear is normal.   Blastomyces antigen,  Bordetella pertussis antibody (elevated IgG and IgM),   fungal antibodies/quantitative double immunodiffusion,  Histoplasma urine Ag (negative),   Quantiferon TB (negative),   Aspergillus antibodies,   cryptococcal antigen (negative),   EBV (elevated EBV IgG, normal IgM),   CMV (elevated IgG and IgM), and   HIV (negative)   Pulmonary office visit.   Beta 2 microglobulin (elevated),   Ehrlichia antibody panel,   RMSF  negative.  [id number] pending.    Blood counts have stabilized,  Bone  Marrow is negative for malignancy     Hypoxia with bronchospasm  --RA sats 88% at rest on admission.-- improved to 92% on RA today  --CTA without PE some thickened airways  --4 more days PO prednisone and Doxycycline.  --stable on RA today     Elevated LFT's  --Fatty infiltration of liver noted on ultrasound.  --Stable. Monitor.     Anxiety/depression  --Continue Zoloft.   S/p xanax 0.25mg x 1     Insomnia  --Continue Amitriptyline.      Discharge Follow Up Recommendations for outpatient labs/diagnostics:   Ehrlichia and ADAMTS still pending    Day of Discharge     HPI:   Patient feeling better, still with some sweats    Review of Systems  negative    Vital Signs:   Temp:  [97.9 °F (36.6 °C)-98.5 °F (36.9 °C)] 98.5 °F (36.9 °C)  Heart Rate:  [70-92] 79  Resp:  [16-18] 16  BP: (118-145)/(71-90) 135/84     Physical Exam:  Patient is alert and talkative looks better today  Neck is without mass or JVD  Heart is Reg wo murmur  Lungs are clear wo wheeze or crackle  Abd is soft without HSM or mass, not tender or distended  MAEW  Skin is without rash STILL HOT, sweaty  Neurologic exam in nonfocal   Mood is appropriate      Pertinent  and/or Most Recent Results     LAB RESULTS:      Lab 07/18/21  0632 07/17/21  0844 07/17/21  0631 07/16/21  0713 07/15/21  0148 07/14/21  1906 07/14/21  1807 07/13/21  1602   WBC 2.58*  --  2.20* 2.19* 3.87  --  5.10 2.38*   HEMOGLOBIN 10.7*  --  10.3* 9.4* 9.5*  --  10.8* 11.8*   HEMATOCRIT 32.3*  --  31.6* 28.5* 28.8*  --  32.1* 35.3   PLATELETS 158  --  154 130* 141  --  145 113*   NEUTROS ABS 1.72  --  1.43* 1.43* 2.87  --  3.91 1.99   IMMATURE GRANS (ABS) 0.03  --  0.03 0.02 0.04  --  0.06*  --    LYMPHS ABS 0.43*  --  0.43* 0.39* 0.54*  --  0.65*  --    MONOS ABS 0.37  --  0.28 0.32 0.42  --  0.47  --    EOS ABS 0.02  --  0.02 0.03 0.00  --  0.00  --    MCV 90.0  --  90.0 90.2 86.7  --  89.7 86.1   SED RATE  --   --   --    --   --   --   --  24*   CRP  --   --   --   --   --   --   --  7.71*   PROCALCITONIN  --   --   --   --   --   --  0.14 0.23   LDH  --   --   --   --   --   --  623* 709*   PROTIME  --   --   --   --   --  13.9  --   --    D DIMER QUANT  --  2.52*  --   --  4.51*  --   --   --          Lab 07/18/21  0632 07/17/21  0631 07/16/21  0713 07/15/21  0148 07/14/21  1807   SODIUM 135* 136 136 131* 138   POTASSIUM 4.0 4.1 3.9 3.7 4.1   CHLORIDE 100 102 104 100 104   CO2 24.0 25.0 22.0 23.0 22.0   ANION GAP 11.0 9.0 10.0 8.0 12.0   BUN 14 8 11 10 10   CREATININE 0.63 0.58 0.54* 0.53* 0.63   GLUCOSE 94 91 82 102* 110*   CALCIUM 9.0 8.4* 8.1* 8.2* 8.3*         Lab 07/18/21  0632 07/17/21  0631 07/16/21  0713 07/15/21  0148 07/14/21  1807   TOTAL PROTEIN 6.0 6.0 5.4* 5.6* 6.4   ALBUMIN 3.00* 2.90* 2.60* 2.60* 3.00*   GLOBULIN 3.0 3.1 2.8 3.0 3.4   ALT (SGPT) 43* 37* 34* 31 38*   AST (SGOT) 31 29 35* 40* 49*   BILIRUBIN 0.3 0.3 0.3 0.4 0.4   ALK PHOS 114 118* 115 130* 158*         Lab 07/17/21  0631 07/14/21  1906   PROBNP 442.7  --    TROPONIN T <0.010  --    PROTIME  --  13.9   INR  --  1.10         Lab 07/14/21  1807   TRIGLYCERIDES 135         Lab 07/15/21  1343 07/14/21  1807   IRON 45  --    IRON SATURATION 16*  --    TIBC 279*  --    TRANSFERRIN 187*  --    FERRITIN  --  1,392.00*         Brief Urine Lab Results  (Last result in the past 365 days)      Color   Clarity   Blood   Leuk Est   Nitrite   Protein   CREAT   Urine HCG        07/07/21 1419 Dark Yellow Cloudy Negative Small (1+) Negative 30 mg/dL (1+)             Microbiology Results (last 10 days)     Procedure Component Value - Date/Time    Body Fluid Culture - Body Fluid, Iliac Crest, Right - Aspirate [[id number]] Collected: 07/15/21 1101    Lab Status: Preliminary result Specimen: Body Fluid from Iliac Crest, Right - Aspirate Updated: 07/18/21 0721     Body Fluid Culture No growth at 3 days     Gram Stain No organisms seen    Fungus Smear - Aspirate, Iliac  Crest, Right - Aspirate [[id number]] Collected: 07/15/21 1101    Lab Status: Final result Specimen: Iliac Crest, Right - Aspirate Updated: 07/16/21 1455     Fungal Stain No fungal elements seen    AFB Culture - Aspirate, Iliac Crest, Right - Aspirate [[id number]] Collected: 07/15/21 1100    Lab Status: Preliminary result Specimen: Iliac Crest, Right - Aspirate Updated: 07/16/21 1122     AFB Stain No acid fast bacilli seen on direct smear    COVID PRE-OP / PRE-PROCEDURE SCREENING ORDER (NO ISOLATION) - Swab, Nasopharynx [[id number]]  (Normal) Collected: 07/14/21 1852    Lab Status: Final result Specimen: Swab from Nasopharynx Updated: 07/14/21 1953    Narrative:      The following orders were created for panel order COVID PRE-OP / PRE-PROCEDURE SCREENING ORDER (NO ISOLATION) - Swab, Nasopharynx.  Procedure                               Abnormality         Status                     ---------                               -----------         ------                     COVID-19,CEPHEID,[facility] IN-...[[id number]]  Normal              Final result                 Please view results for these tests on the individual orders.    COVID-19,CEPHEID,BROOKE IN-HOUSE(OR EMERGENT/ADD-ON),NP SWAB IN TRANSPORT MEDIA 3-4 HR TAT - Swab, Nasopharynx [[id number]]  (Normal) Collected: 07/14/21 1852    Lab Status: Final result Specimen: Swab from Nasopharynx Updated: 07/14/21 1953     COVID19 Not Detected    Narrative:      Fact sheet for providers: https://www.fda.gov/media/023518/download     Fact sheet for patients: https://www.fda.gov/media/853739/download  Fact sheet for providers: https://www.fda.gov/media/716153/download     Fact sheet for patients: https://www.fda.gov/media/842578/download          CT Abdomen Pelvis With & Without Contrast    Result Date: 7/15/2021  EXAMINATION: CT ABDOMEN PELVIS W WO CONTRAST-  INDICATION: rule out Budd Chiari syndrome  TECHNIQUE: Axial CT of the abdomen and pelvis performed without and with IV contrast,  arterial, portal venous and delayed phase per CT liver protocol with multiplanar reconstruction  The radiation dose reduction device was turned on for each scan per the ALARA (As Low as Reasonably Achievable) protocol.  COMPARISON: NONE  FINDINGS: The lung bases are grossly clear. Body wall soft tissues are unremarkable. There is no evidence of acute fracture or aggressive osseous lesion. The liver demonstrates normal and homogeneous enhancement without evidence of suspicious focal lesion. There is normal opacification of the hepatic venous structures with flow into the IVC, demonstrating no heterogeneous enhancement or other parenchymal signal abnormality to suggest venous congestion or Budd-Chiari syndrome. There is no biliary ductal dilatation. The gallbladder is normal. Enlarged spleen measuring up to 15 cm, otherwise homogeneous and unremarkable in appearance. The pancreas and bilateral adrenal glands are homogeneous. The kidneys demonstrate symmetric nephrogram and contrast excretion without evidence of hydronephrosis or contour deforming mass. Small and large bowel loops are nondilated. There is no suspicious focal bowel wall thickening. No free fluid or pneumoperitoneum. Normal abdominal aorta. No bulky retroperitoneal adenopathy. The pelvic viscera are unremarkable.      Normal multiphase contrast-enhanced appearance of the liver without evidence of suspicious focal lesion, heterogeneous enhancement or other parenchymal abnormality to suggest Budd-Chiari syndrome.  Enlarged but otherwise homogeneous spleen measuring up to 15 cm.  No acute findings in the abdomen and pelvis otherwise.   This report was finalized on 7/15/2021 10:54 AM by Manpreet Milton.      US Abdomen Complete    Result Date: 7/9/2021  EXAMINATION: US ABDOMEN, COMPLETE-07/09/2021:  INDICATION: Upper abdomen to visualize liver and spleen due to elevated liver enzymes and low platelet count.; R74.8-Abnormal levels of other serum enzymes;  D69.6-Thrombocytopenia, unspecified.  TECHNIQUE: Ultrasound of the abdomen, complete.  COMPARISON: NONE.  FINDINGS:  The visualized portions of the aorta and IVC are grossly unremarkable.  The visualized portions of the pancreas with questionable increased echogenicity, however, no focal mass lesion evident.  The liver with increased echogenicity throughout the hepatic parenchyma, however, no focal liver lesion.  The gallbladder is present with low-level internal echoes of sludge without cholelithiasis or wall thickening. No pericholecystic fluid.  The common bile duct measures 6 mm in diameter.  The right kidney measures 12.3 cm in length without evidence of hydronephrosis, contour deforming mass or obvious calculi.  The left kidney measures 11.8 cm in length without evidence of hydronephrosis, contour deforming mass or obvious calculi.  The spleen is enlarged at 16.2 cm.      1. Increased echogenicity throughout the hepatic parenchyma consistent with parenchymal disease process such as hepatic steatosis. No focal liver lesion.  2. Splenomegaly measuring up to 16.2 cm concerning for developing portal hypertension in the setting of hepatic parenchymal process.  3. No ascites.  D:  07/09/2021 E:  07/09/2021     This report was finalized on 7/9/2021 4:44 PM by Dr. Drew Valencia.      CT Angiogram Chest    Result Date: 7/17/2021  EXAMINATION: CT ANGIOGRAM CHEST-  INDICATION: PE suspected, low/intermediate prob, positive D-dimer  TECHNIQUE: Axial pulmonary arterial phase IV contrast enhanced CT angiogram of the chest per PE protocol. Two-dimensional reconstructions were postprocessed.  The radiation dose reduction device was turned on for each scan per the ALARA (As Low as Reasonably Achievable) protocol.  COMPARISON: NONE  FINDINGS: No pathologic axillary adenopathy or other worrisome body wall soft tissue finding in the chest. No acute findings in the partially imaged upper abdomen. No pleural or pericardial effusion.  No pathologic mediastinal or hilar adenopathy. The osseous structures demonstrate no evidence of acute fracture or aggressive osseous lesion. The pulmonary arteries are well-opacified and without evidence of filling defect concerning for acute pulmonary embolus. Evaluation of the lung parenchyma demonstrates some bilateral central predominant areas of interlobular septal thickening and minimal groundglass opacity with appearance favoring pulmonary edema pattern over possible multifocal pneumonia. A peripheral pleural adjacent 11 mm nodule is present in the left upper lobe.      No acute pulmonary embolus. Bilateral central predominant areas of fairly symmetric interlobular septal thickening and minimal groundglass opacity, with appearance favoring edema pattern over multifocal atypical pneumonia. Findings not typical of Covid 19 pneumonia. There is 11 mm pleural-based nodule in the left upper lobe which while likely benign would be best followed with 3-6 month follow-up chest CT to ensure stability.   This report was finalized on 7/17/2021 3:31 PM by Manpreet Milton.      CT Bone marrow biopsy and aspiration    Result Date: 7/16/2021  EXAMINATION: CT GUIDED BIOPSY BONE MARROW-  INDICATION: Pancytopenia, referred to interventional radiology for bone marrow biopsy.  TECHNIQUE: After informed written consent, the patient was placed in the prone position on the CT table.  CT of the upper pelvis was performed and an appropriate access point was chosen at skin overlying the right posterior superior iliac spine. The lower back was prepped and draped in normal sterile fashion. After local infiltration of 1% lidocaine and administration of intravenous sedation, a small nick was made in the skin and an 11-gauge Jamshidi needle was advanced until tip abutted the iliac spine. 15 mL of bone marrow aspirate was extracted in a heparinized syringe and sent for sampling. Subsequently, the Jamshidi needle was advanced  approximately 4 cm into the iliac bone and a bone marrow plug was extracted. 3 minutes of manual compression was applied with successful hemostasis and a sterile dressing was applied. The patient tolerated the procedure well without evidence of immediate complication.  COMPARISON: NONE  SEDATION: Versed and fentanyl intravenous. Sedation was continuously monitored by intensive care nurse under my direct supervision. Sedation commenced at 905 and procedure was completed at 931       Successful CT-guided bone marrow biopsy as above.  This report was finalized on 7/16/2021 6:45 PM by Angélica Tripp.      CT outside films    Result Date: 7/13/2021  This procedure was auto-finalized with no dictation required.                  Plan for Follow-up of Pending Labs/Results:   Pending Labs     Order Current Status    [id number] Activity In process    Ehrlichia Antibody Panel In process    Fungus Culture - Aspirate, Iliac Crest, Right - Aspirate In process    Protein Elec + Interp, Serum In process    AFB Culture - Aspirate, Iliac Crest, Right - Aspirate Preliminary result    Body Fluid Culture - Body Fluid, Iliac Crest, Right - Aspirate Preliminary result        Discharge Details        Discharge Medications      New Medications      Instructions Start Date   ascorbic acid 100 MG tablet  Commonly known as: VITAMIN C   250 mg, Oral, Daily      ferrous gluconate 324 MG tablet  Commonly known as: FERGON   324 mg, Oral, Daily With Breakfast         Changes to Medications      Instructions Start Date   predniSONE 20 MG tablet  Commonly known as: DELTASONE  What changed: additional instructions   1 tab a day for 4 more days         Continue These Medications      Instructions Start Date   albuterol (2.5 MG/3ML) 0.083% nebulizer solution  Commonly known as: PROVENTIL   2.5 mg, Nebulization, Every 4 Hours PRN      amitriptyline 25 MG tablet  Commonly known as: ELAVIL   TAKE 1-2 TABLETS BY MOUTH 30 MINUTES BEFORE BEDTIME.      cetirizine  10 MG tablet  Commonly known as: zyrTEC   10 mg, Oral, Daily      [employer] Fluticasone Propionate 50 MCG/ACT nasal spray  Generic drug: fluticasone   1 spray, Each Nare, Daily      ibuprofen 200 MG tablet  Commonly known as: ADVIL,MOTRIN   400 mg, Oral, Every 6 Hours PRN      omeprazole 20 MG capsule  Commonly known as: PrilOSEC   20 mg, Oral, Daily      promethazine-codeine 6.25-10 MG/5ML solution  Commonly known as: PHENERGAN with CODEINE   5 mL, Oral, Every 4 Hours PRN      propranolol 20 MG tablet  Commonly known as: INDERAL   Take 1/2 to 1 tablet by mouth 3 times a day as needed for anxiety.      sertraline 100 MG tablet  Commonly known as: Zoloft   100 mg, Oral, Daily      vitamin D3 125 MCG (5000 UT) capsule capsule   2,000 Units, Oral, Daily         ASK your doctor about these medications      Instructions Start Date   doxycycline 100 MG capsule  Commonly known as: MONODOX  Ask about: Should I take this medication?   100 mg, Oral, 2 Times Daily      QUEtiapine 25 MG tablet  Commonly known as: SEROquel   TAKE 1 2 TABLETS 30 MINUTES BEFORE BEDTIME AS NEEDED FOR SLEEP.             Allergies   Allergen Reactions   • Eggs Or Egg-Derived Products    • Sudafed [Pseudoephedrine]          Discharge Disposition:  Home or Self Care    Diet:  Hospital:  Diet Order   Procedures   • Diet Regular       Activity:      Restrictions or Other Recommendations:         CODE STATUS:    Code Status and Medical Interventions:   Ordered at: 07/14/21 2236     Level Of Support Discussed With:    Patient     Code Status:    CPR     Medical Interventions (Level of Support Prior to Arrest):    Full       Future Appointments   Date Time Provider Department Center   7/22/2021  1:00 PM [facility] CT 1  [facility]   8/10/2021  9:00 AM Jenn Mcclellan APRN [facility]   11/19/2021  9:00 AM Jenn Mcclellan APRN [facility]       Additional Instructions for the Follow-ups that You Need to Schedule      Discharge Follow-up with PCP   As directed       Currently Documented PCP:    Jenn Mcclellan APRN    PCP Phone Number:    [phone]     Follow Up Details: THis week, cbc                     Gilma Joyner MD  07/18/21      Time Spent on Discharge:  I spent  45  minutes on this discharge activity which included: face-to-face encounter with the patient, reviewing the data in the system, coordination of the care with the nursing staff as well as consultants, documentation, and entering orders.

## 2021-07-18 NOTE — PLAN OF CARE
Goal Outcome Evaluation:  Plan of Care Reviewed With: patient        Progress: no change  Outcome Summary: pt rested well throughout night, no c/o pain, no c/o n/v/d, weaned to 1L, tolerated; vss, pt very hopeful after results of bone marrow biopsy, will continue to monitor.

## 2021-07-19 ENCOUNTER — TRANSITIONAL CARE MANAGEMENT TELEPHONE ENCOUNTER (OUTPATIENT)
Dept: CALL CENTER | Facility: HOSPITAL | Age: 46
End: 2021-07-19

## 2021-07-19 LAB
ALBUMIN SERPL ELPH-MCNC: 2.4 G/DL (ref 2.9–4.4)
ALBUMIN/GLOB SERPL: 0.9 {RATIO} (ref 0.7–1.7)
ALPHA1 GLOB SERPL ELPH-MCNC: 0.4 G/DL (ref 0–0.4)
ALPHA2 GLOB SERPL ELPH-MCNC: 0.5 G/DL (ref 0.4–1)
B-GLOBULIN SERPL ELPH-MCNC: 0.8 G/DL (ref 0.7–1.3)
GAMMA GLOB SERPL ELPH-MCNC: 1.1 G/DL (ref 0.4–1.8)
GLOBULIN SER CALC-MCNC: 2.8 G/DL (ref 2.2–3.9)
LABORATORY COMMENT REPORT: ABNORMAL
LABORATORY COMMENT REPORT: NORMAL
M PROTEIN SERPL ELPH-MCNC: ABNORMAL G/DL
MVISTA(R) BLASTOMYCES AG: 0.91 NG/ML
PROT PATTERN SERPL ELPH-IMP: ABNORMAL
PROT SERPL-MCNC: 5.2 G/DL (ref 6–8.5)
SPECIMEN SOURCE: NORMAL
VWF CP ACT/NOR PPP CHRO: 86 %

## 2021-07-19 NOTE — OUTREACH NOTE
"Call Center TCM Note      Responses   [facility] patient discharged from?  [place]   Does the patient have one of the following disease processes/diagnoses(primary or secondary)?  Other   TCM attempt successful?  No [\"N/A\" is listed on verbal release]   Unsuccessful attempts  Attempt 1          Mary Wilson RN    7/19/2021, 12:58 EDT      "

## 2021-07-19 NOTE — OUTREACH NOTE
Call Center TCM Note      Responses   [facility] patient discharged from?  [place]   Does the patient have one of the following disease processes/diagnoses(primary or secondary)?  Other   TCM attempt successful?  No   Unsuccessful attempts  Attempt 2          Mary Wilson RN    7/19/2021, 13:29 EDT

## 2021-07-20 ENCOUNTER — TRANSITIONAL CARE MANAGEMENT TELEPHONE ENCOUNTER (OUTPATIENT)
Dept: CALL CENTER | Facility: HOSPITAL | Age: 46
End: 2021-07-20

## 2021-07-20 LAB
BACTERIA FLD CULT: NORMAL
GRAM STN SPEC: NORMAL

## 2021-07-20 NOTE — OUTREACH NOTE
Call Center TCM Note      Responses   [facility] patient discharged from?  [place]   Does the patient have one of the following disease processes/diagnoses(primary or secondary)?  Other   TCM attempt successful?  No   Unsuccessful attempts  Attempt 3          Franchesca Cash RN    7/20/2021, 08:41 EDT

## 2021-07-21 ENCOUNTER — HOSPITAL ENCOUNTER (INPATIENT)
Facility: HOSPITAL | Age: 46
LOS: 9 days | Discharge: HOME | End: 2021-07-30
Attending: INTERNAL MEDICINE | Admitting: INTERNAL MEDICINE

## 2021-07-21 DIAGNOSIS — B49 DEEP FUNGAL INFECTION: ICD-10-CM

## 2021-07-21 DIAGNOSIS — J96.01 ACUTE RESPIRATORY FAILURE WITH HYPOXEMIA (HCC): ICD-10-CM

## 2021-07-21 DIAGNOSIS — B49 FUNGAL GRANULOMA: Primary | ICD-10-CM

## 2021-07-21 PROBLEM — Z86.16 PERSONAL HISTORY OF COVID-19: Status: ACTIVE | Noted: 2021-07-21

## 2021-07-21 PROBLEM — R09.02 HYPOXIA: Status: RESOLVED | Noted: 2021-07-14 | Resolved: 2021-07-21

## 2021-07-21 LAB
ALBUMIN SERPL-MCNC: 3.6 G/DL (ref 3.5–5.2)
ALBUMIN/GLOB SERPL: 1 G/DL
ALP SERPL-CCNC: 130 U/L (ref 39–117)
ALT SERPL W P-5'-P-CCNC: 60 U/L (ref 1–33)
ANION GAP SERPL CALCULATED.3IONS-SCNC: 17 MMOL/L (ref 5–15)
AST SERPL-CCNC: 40 U/L (ref 1–32)
BASOPHILS # BLD AUTO: 0.01 10*3/MM3 (ref 0–0.2)
BASOPHILS NFR BLD AUTO: 0.3 % (ref 0–1.5)
BILIRUB SERPL-MCNC: 0.3 MG/DL (ref 0–1.2)
BUN SERPL-MCNC: 12 MG/DL (ref 6–20)
BUN/CREAT SERPL: 17.6 (ref 7–25)
CALCIUM SPEC-SCNC: 9.2 MG/DL (ref 8.6–10.5)
CHLORIDE SERPL-SCNC: 99 MMOL/L (ref 98–107)
CO2 SERPL-SCNC: 19 MMOL/L (ref 22–29)
CREAT SERPL-MCNC: 0.68 MG/DL (ref 0.57–1)
DEPRECATED RDW RBC AUTO: 43.4 FL (ref 37–54)
EOSINOPHIL # BLD AUTO: 0.01 10*3/MM3 (ref 0–0.4)
EOSINOPHIL NFR BLD AUTO: 0.3 % (ref 0.3–6.2)
ERYTHROCYTE [DISTWIDTH] IN BLOOD BY AUTOMATED COUNT: 14.1 % (ref 12.3–15.4)
FERRITIN SERPL-MCNC: 599.9 NG/ML (ref 13–150)
GFR SERPL CREATININE-BSD FRML MDRD: 94 ML/MIN/1.73
GLOBULIN UR ELPH-MCNC: 3.5 GM/DL
GLUCOSE SERPL-MCNC: 178 MG/DL (ref 65–99)
HCT VFR BLD AUTO: 35.2 % (ref 34–46.6)
HGB BLD-MCNC: 11.6 G/DL (ref 12–15.9)
IMM GRANULOCYTES # BLD AUTO: 0.05 10*3/MM3 (ref 0–0.05)
IMM GRANULOCYTES NFR BLD AUTO: 1.6 % (ref 0–0.5)
LDH SERPL-CCNC: 348 U/L (ref 135–214)
LYMPHOCYTES # BLD AUTO: 0.42 10*3/MM3 (ref 0.7–3.1)
LYMPHOCYTES NFR BLD AUTO: 13.4 % (ref 19.6–45.3)
MCH RBC QN AUTO: 28.2 PG (ref 26.6–33)
MCHC RBC AUTO-ENTMCNC: 33 G/DL (ref 31.5–35.7)
MCV RBC AUTO: 85.4 FL (ref 79–97)
MONOCYTES # BLD AUTO: 0.15 10*3/MM3 (ref 0.1–0.9)
MONOCYTES NFR BLD AUTO: 4.8 % (ref 5–12)
NEUTROPHILS NFR BLD AUTO: 2.5 10*3/MM3 (ref 1.7–7)
NEUTROPHILS NFR BLD AUTO: 79.6 % (ref 42.7–76)
NRBC BLD AUTO-RTO: 0 /100 WBC (ref 0–0.2)
PLATELET # BLD AUTO: 190 10*3/MM3 (ref 140–450)
PMV BLD AUTO: 10.7 FL (ref 6–12)
POTASSIUM SERPL-SCNC: 4.8 MMOL/L (ref 3.5–5.2)
PROT SERPL-MCNC: 7.1 G/DL (ref 6–8.5)
QT INTERVAL: 384 MS
QTC INTERVAL: 451 MS
RBC # BLD AUTO: 4.12 10*6/MM3 (ref 3.77–5.28)
SODIUM SERPL-SCNC: 135 MMOL/L (ref 136–145)
WBC # BLD AUTO: 3.14 10*3/MM3 (ref 3.4–10.8)

## 2021-07-21 PROCEDURE — 83615 LACTATE (LD) (LDH) ENZYME: CPT | Performed by: INTERNAL MEDICINE

## 2021-07-21 PROCEDURE — 86698 HISTOPLASMA ANTIBODY: CPT

## 2021-07-21 PROCEDURE — 87385 HISTOPLASMA CAPSUL AG IA: CPT | Performed by: INTERNAL MEDICINE

## 2021-07-21 PROCEDURE — 82785 ASSAY OF IGE: CPT | Performed by: INTERNAL MEDICINE

## 2021-07-21 PROCEDURE — 80053 COMPREHEN METABOLIC PANEL: CPT | Performed by: INTERNAL MEDICINE

## 2021-07-21 PROCEDURE — 87103 BLOOD FUNGUS CULTURE: CPT | Performed by: INTERNAL MEDICINE

## 2021-07-21 PROCEDURE — 82784 ASSAY IGA/IGD/IGG/IGM EACH: CPT | Performed by: INTERNAL MEDICINE

## 2021-07-21 PROCEDURE — 87449 NOS EACH ORGANISM AG IA: CPT | Performed by: INTERNAL MEDICINE

## 2021-07-21 PROCEDURE — 85025 COMPLETE CBC W/AUTO DIFF WBC: CPT | Performed by: INTERNAL MEDICINE

## 2021-07-21 PROCEDURE — 99223 1ST HOSP IP/OBS HIGH 75: CPT | Performed by: INTERNAL MEDICINE

## 2021-07-21 PROCEDURE — 82728 ASSAY OF FERRITIN: CPT | Performed by: INTERNAL MEDICINE

## 2021-07-21 RX ORDER — ASCORBIC ACID 500 MG
250 TABLET ORAL DAILY
Status: DISCONTINUED | OUTPATIENT
Start: 2021-07-22 | End: 2021-07-30 | Stop reason: HOSPADM

## 2021-07-21 RX ORDER — PANTOPRAZOLE SODIUM 40 MG/1
40 TABLET, DELAYED RELEASE ORAL EVERY MORNING
Refills: 5 | Status: DISCONTINUED | OUTPATIENT
Start: 2021-07-22 | End: 2021-07-30 | Stop reason: HOSPADM

## 2021-07-21 RX ORDER — AMITRIPTYLINE HYDROCHLORIDE 25 MG/1
25 TABLET, FILM COATED ORAL NIGHTLY
Status: DISCONTINUED | OUTPATIENT
Start: 2021-07-21 | End: 2021-07-30 | Stop reason: HOSPADM

## 2021-07-21 RX ORDER — PROPRANOLOL HYDROCHLORIDE 10 MG/1
10 TABLET ORAL 3 TIMES DAILY PRN
Status: DISCONTINUED | OUTPATIENT
Start: 2021-07-21 | End: 2021-07-30 | Stop reason: HOSPADM

## 2021-07-21 RX ORDER — DIPHENHYDRAMINE HCL 50 MG
50 CAPSULE ORAL DAILY PRN
Status: DISCONTINUED | OUTPATIENT
Start: 2021-07-21 | End: 2021-07-30 | Stop reason: HOSPADM

## 2021-07-21 RX ORDER — MEPERIDINE HYDROCHLORIDE 25 MG/ML
12.5 INJECTION INTRAMUSCULAR; INTRAVENOUS; SUBCUTANEOUS
Status: DISPENSED | OUTPATIENT
Start: 2021-07-21 | End: 2021-07-23

## 2021-07-21 RX ORDER — DIPHENHYDRAMINE HCL 50 MG
50 CAPSULE ORAL EVERY 24 HOURS
Status: DISCONTINUED | OUTPATIENT
Start: 2021-07-22 | End: 2021-07-30 | Stop reason: HOSPADM

## 2021-07-21 RX ORDER — IBUPROFEN 400 MG/1
400 TABLET ORAL EVERY 6 HOURS PRN
Status: DISCONTINUED | OUTPATIENT
Start: 2021-07-21 | End: 2021-07-24

## 2021-07-21 RX ORDER — PROMETHAZINE HYDROCHLORIDE AND CODEINE PHOSPHATE 6.25; 1 MG/5ML; MG/5ML
5 SOLUTION ORAL EVERY 4 HOURS PRN
Status: DISCONTINUED | OUTPATIENT
Start: 2021-07-21 | End: 2021-07-30 | Stop reason: HOSPADM

## 2021-07-21 RX ORDER — PREDNISONE 1 MG/1
10 TABLET ORAL
Status: DISCONTINUED | OUTPATIENT
Start: 2021-07-22 | End: 2021-07-21

## 2021-07-21 RX ORDER — MEPERIDINE HYDROCHLORIDE 25 MG/ML
25 INJECTION INTRAMUSCULAR; INTRAVENOUS; SUBCUTANEOUS
Status: ACTIVE | OUTPATIENT
Start: 2021-07-21 | End: 2021-07-23

## 2021-07-21 RX ORDER — CETIRIZINE HYDROCHLORIDE 10 MG/1
10 TABLET ORAL DAILY
Status: DISCONTINUED | OUTPATIENT
Start: 2021-07-22 | End: 2021-07-24

## 2021-07-21 RX ORDER — ACETAMINOPHEN 325 MG/1
650 TABLET ORAL EVERY 24 HOURS
Status: DISCONTINUED | OUTPATIENT
Start: 2021-07-21 | End: 2021-07-22

## 2021-07-21 RX ORDER — ALBUTEROL SULFATE 2.5 MG/3ML
2.5 SOLUTION RESPIRATORY (INHALATION) EVERY 4 HOURS PRN
Status: DISCONTINUED | OUTPATIENT
Start: 2021-07-21 | End: 2021-07-30 | Stop reason: HOSPADM

## 2021-07-21 RX ORDER — MEPERIDINE HYDROCHLORIDE 25 MG/ML
25 INJECTION INTRAMUSCULAR; INTRAVENOUS; SUBCUTANEOUS EVERY 24 HOURS
Status: DISCONTINUED | OUTPATIENT
Start: 2021-07-22 | End: 2021-07-22

## 2021-07-21 RX ORDER — SERTRALINE HYDROCHLORIDE 100 MG/1
100 TABLET, FILM COATED ORAL DAILY
Status: DISCONTINUED | OUTPATIENT
Start: 2021-07-22 | End: 2021-07-30 | Stop reason: HOSPADM

## 2021-07-21 RX ADMIN — ACETAMINOPHEN 650 MG: 325 TABLET ORAL at 21:49

## 2021-07-21 RX ADMIN — AMITRIPTYLINE HYDROCHLORIDE 25 MG: 25 TABLET, FILM COATED ORAL at 21:49

## 2021-07-22 ENCOUNTER — HOSPITAL ENCOUNTER (OUTPATIENT)
Dept: CT IMAGING | Facility: HOSPITAL | Age: 46
Discharge: HOME | End: 2021-07-22

## 2021-07-22 ENCOUNTER — READMISSION MANAGEMENT (OUTPATIENT)
Dept: CALL CENTER | Facility: HOSPITAL | Age: 46
End: 2021-07-22

## 2021-07-22 ENCOUNTER — APPOINTMENT (OUTPATIENT)
Dept: ULTRASOUND IMAGING | Facility: HOSPITAL | Age: 46
End: 2021-07-22

## 2021-07-22 LAB
ALBUMIN SERPL-MCNC: 3.3 G/DL (ref 3.5–5.2)
ALBUMIN/GLOB SERPL: 1 G/DL
ALP SERPL-CCNC: 111 U/L (ref 39–117)
ALT SERPL W P-5'-P-CCNC: 52 U/L (ref 1–33)
ANION GAP SERPL CALCULATED.3IONS-SCNC: 10 MMOL/L (ref 5–15)
AST SERPL-CCNC: 33 U/L (ref 1–32)
BASOPHILS # BLD AUTO: 0 10*3/MM3 (ref 0–0.2)
BASOPHILS NFR BLD AUTO: 0 % (ref 0–1.5)
BILIRUB SERPL-MCNC: 0.3 MG/DL (ref 0–1.2)
BUN SERPL-MCNC: 12 MG/DL (ref 6–20)
BUN/CREAT SERPL: 18.2 (ref 7–25)
CALCIUM SPEC-SCNC: 8.8 MG/DL (ref 8.6–10.5)
CHLORIDE SERPL-SCNC: 100 MMOL/L (ref 98–107)
CO2 SERPL-SCNC: 27 MMOL/L (ref 22–29)
CREAT SERPL-MCNC: 0.66 MG/DL (ref 0.57–1)
DEPRECATED RDW RBC AUTO: 44.1 FL (ref 37–54)
EOSINOPHIL # BLD AUTO: 0.01 10*3/MM3 (ref 0–0.4)
EOSINOPHIL NFR BLD AUTO: 0.6 % (ref 0.3–6.2)
ERYTHROCYTE [DISTWIDTH] IN BLOOD BY AUTOMATED COUNT: 14 % (ref 12.3–15.4)
GFR SERPL CREATININE-BSD FRML MDRD: 98 ML/MIN/1.73
GLOBULIN UR ELPH-MCNC: 3.3 GM/DL
GLUCOSE SERPL-MCNC: 79 MG/DL (ref 65–99)
HCT VFR BLD AUTO: 33.4 % (ref 34–46.6)
HGB BLD-MCNC: 10.8 G/DL (ref 12–15.9)
IMM GRANULOCYTES # BLD AUTO: 0.03 10*3/MM3 (ref 0–0.05)
IMM GRANULOCYTES NFR BLD AUTO: 1.7 % (ref 0–0.5)
LYMPHOCYTES # BLD AUTO: 0.31 10*3/MM3 (ref 0.7–3.1)
LYMPHOCYTES NFR BLD AUTO: 17.4 % (ref 19.6–45.3)
MAGNESIUM SERPL-MCNC: 2.1 MG/DL (ref 1.6–2.6)
MCH RBC QN AUTO: 28.7 PG (ref 26.6–33)
MCHC RBC AUTO-ENTMCNC: 32.3 G/DL (ref 31.5–35.7)
MCV RBC AUTO: 88.8 FL (ref 79–97)
MONOCYTES # BLD AUTO: 0.24 10*3/MM3 (ref 0.1–0.9)
MONOCYTES NFR BLD AUTO: 13.5 % (ref 5–12)
NEUTROPHILS NFR BLD AUTO: 1.19 10*3/MM3 (ref 1.7–7)
NEUTROPHILS NFR BLD AUTO: 66.8 % (ref 42.7–76)
NRBC BLD AUTO-RTO: 0 /100 WBC (ref 0–0.2)
PLATELET # BLD AUTO: 156 10*3/MM3 (ref 140–450)
PMV BLD AUTO: 10.7 FL (ref 6–12)
POTASSIUM SERPL-SCNC: 4.3 MMOL/L (ref 3.5–5.2)
PROT SERPL-MCNC: 6.6 G/DL (ref 6–8.5)
RBC # BLD AUTO: 3.76 10*6/MM3 (ref 3.77–5.28)
SODIUM SERPL-SCNC: 137 MMOL/L (ref 136–145)
WBC # BLD AUTO: 1.78 10*3/MM3 (ref 3.4–10.8)

## 2021-07-22 PROCEDURE — 80053 COMPREHEN METABOLIC PANEL: CPT | Performed by: INTERNAL MEDICINE

## 2021-07-22 PROCEDURE — 85025 COMPLETE CBC W/AUTO DIFF WBC: CPT | Performed by: INTERNAL MEDICINE

## 2021-07-22 PROCEDURE — 99233 SBSQ HOSP IP/OBS HIGH 50: CPT | Performed by: INTERNAL MEDICINE

## 2021-07-22 PROCEDURE — 25010000002 AMPHOTERICIN B LIPOSOME PER 10 MG: Performed by: INTERNAL MEDICINE

## 2021-07-22 PROCEDURE — 25010000003 MEPERIDINE PER 100 MG: Performed by: INTERNAL MEDICINE

## 2021-07-22 PROCEDURE — 63710000001 DIPHENHYDRAMINE PER 50 MG: Performed by: INTERNAL MEDICINE

## 2021-07-22 PROCEDURE — 83735 ASSAY OF MAGNESIUM: CPT | Performed by: INTERNAL MEDICINE

## 2021-07-22 RX ORDER — ACETAMINOPHEN 325 MG/1
650 TABLET ORAL ONCE
Status: COMPLETED | OUTPATIENT
Start: 2021-07-22 | End: 2021-07-22

## 2021-07-22 RX ORDER — ACETAMINOPHEN 325 MG/1
650 TABLET ORAL EVERY 24 HOURS
Status: DISCONTINUED | OUTPATIENT
Start: 2021-07-23 | End: 2021-07-30 | Stop reason: HOSPADM

## 2021-07-22 RX ORDER — ACETAMINOPHEN 325 MG/1
650 TABLET ORAL EVERY 6 HOURS PRN
Status: DISCONTINUED | OUTPATIENT
Start: 2021-07-22 | End: 2021-07-30 | Stop reason: HOSPADM

## 2021-07-22 RX ORDER — FERROUS SULFATE 325(65) MG
325 TABLET ORAL
Status: DISCONTINUED | OUTPATIENT
Start: 2021-07-22 | End: 2021-07-30 | Stop reason: HOSPADM

## 2021-07-22 RX ADMIN — AMITRIPTYLINE HYDROCHLORIDE 25 MG: 25 TABLET, FILM COATED ORAL at 20:29

## 2021-07-22 RX ADMIN — PANTOPRAZOLE SODIUM 40 MG: 40 TABLET, DELAYED RELEASE ORAL at 06:52

## 2021-07-22 RX ADMIN — ACETAMINOPHEN 650 MG: 325 TABLET ORAL at 08:39

## 2021-07-22 RX ADMIN — AMPHOTERICIN B 240 MG: 50 INJECTION, POWDER, LYOPHILIZED, FOR SOLUTION INTRAVENOUS at 09:31

## 2021-07-22 RX ADMIN — Medication 5000 UNITS: at 08:38

## 2021-07-22 RX ADMIN — MEPERIDINE HYDROCHLORIDE 25 MG: 25 INJECTION INTRAMUSCULAR; INTRAVENOUS; SUBCUTANEOUS at 08:39

## 2021-07-22 RX ADMIN — SERTRALINE HYDROCHLORIDE 100 MG: 100 TABLET ORAL at 08:38

## 2021-07-22 RX ADMIN — IBUPROFEN 400 MG: 400 TABLET ORAL at 16:14

## 2021-07-22 RX ADMIN — SODIUM CHLORIDE 500 ML: 9 INJECTION, SOLUTION INTRAVENOUS at 06:51

## 2021-07-22 RX ADMIN — DIPHENHYDRAMINE HYDROCHLORIDE 50 MG: 50 CAPSULE ORAL at 08:39

## 2021-07-22 RX ADMIN — ACETAMINOPHEN 650 MG: 325 TABLET ORAL at 23:18

## 2021-07-22 NOTE — OUTREACH NOTE
Medical Week 2 Survey      Responses   [facility] patient discharged from?  [place]   Does the patient have one of the following disease processes/diagnoses(primary or secondary)?  Other   Week 2 attempt successful?  No   Revoke  Readmitted          Carmen Burgess RN

## 2021-07-23 LAB
ANION GAP SERPL CALCULATED.3IONS-SCNC: 10 MMOL/L (ref 5–15)
BUN SERPL-MCNC: 12 MG/DL (ref 6–20)
BUN/CREAT SERPL: 15.8 (ref 7–25)
CALCIUM SPEC-SCNC: 8.5 MG/DL (ref 8.6–10.5)
CHLORIDE SERPL-SCNC: 96 MMOL/L (ref 98–107)
CO2 SERPL-SCNC: 25 MMOL/L (ref 22–29)
CREAT SERPL-MCNC: 0.76 MG/DL (ref 0.57–1)
GFR SERPL CREATININE-BSD FRML MDRD: 83 ML/MIN/1.73
GLUCOSE SERPL-MCNC: 94 MG/DL (ref 65–99)
MAGNESIUM SERPL-MCNC: 1.7 MG/DL (ref 1.6–2.6)
POTASSIUM SERPL-SCNC: 4.2 MMOL/L (ref 3.5–5.2)
SODIUM SERPL-SCNC: 131 MMOL/L (ref 136–145)

## 2021-07-23 PROCEDURE — 25010000003 MEPERIDINE PER 100 MG: Performed by: INTERNAL MEDICINE

## 2021-07-23 PROCEDURE — 25010000002 AMPHOTERICIN B LIPOSOME PER 10 MG: Performed by: INTERNAL MEDICINE

## 2021-07-23 PROCEDURE — 80048 BASIC METABOLIC PNL TOTAL CA: CPT | Performed by: INTERNAL MEDICINE

## 2021-07-23 PROCEDURE — 83735 ASSAY OF MAGNESIUM: CPT | Performed by: INTERNAL MEDICINE

## 2021-07-23 PROCEDURE — 86361 T CELL ABSOLUTE COUNT: CPT | Performed by: INTERNAL MEDICINE

## 2021-07-23 PROCEDURE — 63710000001 DIPHENHYDRAMINE PER 50 MG: Performed by: INTERNAL MEDICINE

## 2021-07-23 PROCEDURE — 99232 SBSQ HOSP IP/OBS MODERATE 35: CPT | Performed by: INTERNAL MEDICINE

## 2021-07-23 PROCEDURE — 86635 COCCIDIOIDES ANTIBODY: CPT | Performed by: INTERNAL MEDICINE

## 2021-07-23 RX ORDER — ONDANSETRON 2 MG/ML
4 INJECTION INTRAMUSCULAR; INTRAVENOUS EVERY 6 HOURS PRN
Status: DISCONTINUED | OUTPATIENT
Start: 2021-07-23 | End: 2021-07-30 | Stop reason: HOSPADM

## 2021-07-23 RX ADMIN — DIPHENHYDRAMINE HYDROCHLORIDE 50 MG: 50 CAPSULE ORAL at 08:41

## 2021-07-23 RX ADMIN — SERTRALINE HYDROCHLORIDE 100 MG: 100 TABLET ORAL at 08:41

## 2021-07-23 RX ADMIN — OXYCODONE HYDROCHLORIDE AND ACETAMINOPHEN 250 MG: 500 TABLET ORAL at 08:41

## 2021-07-23 RX ADMIN — AMITRIPTYLINE HYDROCHLORIDE 25 MG: 25 TABLET, FILM COATED ORAL at 20:11

## 2021-07-23 RX ADMIN — MEPERIDINE HYDROCHLORIDE 12.5 MG: 25 INJECTION INTRAMUSCULAR; INTRAVENOUS; SUBCUTANEOUS at 12:06

## 2021-07-23 RX ADMIN — ACETAMINOPHEN 650 MG: 325 TABLET ORAL at 18:59

## 2021-07-23 RX ADMIN — Medication 5000 UNITS: at 08:40

## 2021-07-23 RX ADMIN — PANTOPRAZOLE SODIUM 40 MG: 40 TABLET, DELAYED RELEASE ORAL at 06:12

## 2021-07-23 RX ADMIN — FERROUS SULFATE TAB 325 MG (65 MG ELEMENTAL FE) 325 MG: 325 (65 FE) TAB at 08:41

## 2021-07-23 RX ADMIN — ACETAMINOPHEN 650 MG: 325 TABLET ORAL at 08:40

## 2021-07-23 RX ADMIN — SODIUM CHLORIDE 500 ML: 9 INJECTION, SOLUTION INTRAVENOUS at 09:29

## 2021-07-23 RX ADMIN — AMPHOTERICIN B 240 MG: 50 INJECTION, POWDER, LYOPHILIZED, FOR SOLUTION INTRAVENOUS at 11:16

## 2021-07-24 LAB
ANION GAP SERPL CALCULATED.3IONS-SCNC: 8 MMOL/L (ref 5–15)
BUN SERPL-MCNC: 11 MG/DL (ref 6–20)
BUN/CREAT SERPL: 15.9 (ref 7–25)
CALCIUM SPEC-SCNC: 7.9 MG/DL (ref 8.6–10.5)
CHLORIDE SERPL-SCNC: 103 MMOL/L (ref 98–107)
CO2 SERPL-SCNC: 22 MMOL/L (ref 22–29)
CREAT SERPL-MCNC: 0.69 MG/DL (ref 0.57–1)
DEPRECATED RDW RBC AUTO: 45.4 FL (ref 37–54)
ERYTHROCYTE [DISTWIDTH] IN BLOOD BY AUTOMATED COUNT: 14.2 % (ref 12.3–15.4)
GFR SERPL CREATININE-BSD FRML MDRD: 93 ML/MIN/1.73
GLUCOSE SERPL-MCNC: 128 MG/DL (ref 65–99)
HCT VFR BLD AUTO: 30.1 % (ref 34–46.6)
HGB BLD-MCNC: 9.6 G/DL (ref 12–15.9)
MAGNESIUM SERPL-MCNC: 2 MG/DL (ref 1.6–2.6)
MCH RBC QN AUTO: 28.2 PG (ref 26.6–33)
MCHC RBC AUTO-ENTMCNC: 31.9 G/DL (ref 31.5–35.7)
MCV RBC AUTO: 88.3 FL (ref 79–97)
PLATELET # BLD AUTO: 99 10*3/MM3 (ref 140–450)
PMV BLD AUTO: 10.8 FL (ref 6–12)
POTASSIUM SERPL-SCNC: 4 MMOL/L (ref 3.5–5.2)
RBC # BLD AUTO: 3.41 10*6/MM3 (ref 3.77–5.28)
SODIUM SERPL-SCNC: 133 MMOL/L (ref 136–145)
WBC # BLD AUTO: 2.26 10*3/MM3 (ref 3.4–10.8)

## 2021-07-24 PROCEDURE — 83735 ASSAY OF MAGNESIUM: CPT | Performed by: INTERNAL MEDICINE

## 2021-07-24 PROCEDURE — 25010000002 AMPHOTERICIN B LIPOSOME PER 10 MG: Performed by: INTERNAL MEDICINE

## 2021-07-24 PROCEDURE — 80048 BASIC METABOLIC PNL TOTAL CA: CPT | Performed by: INTERNAL MEDICINE

## 2021-07-24 PROCEDURE — 85027 COMPLETE CBC AUTOMATED: CPT | Performed by: INTERNAL MEDICINE

## 2021-07-24 PROCEDURE — 99233 SBSQ HOSP IP/OBS HIGH 50: CPT | Performed by: HOSPITALIST

## 2021-07-24 PROCEDURE — 63710000001 DIPHENHYDRAMINE PER 50 MG: Performed by: INTERNAL MEDICINE

## 2021-07-24 PROCEDURE — 25010000002 ONDANSETRON PER 1 MG: Performed by: INTERNAL MEDICINE

## 2021-07-24 RX ORDER — CETIRIZINE HYDROCHLORIDE 10 MG/1
10 TABLET ORAL DAILY PRN
Status: DISCONTINUED | OUTPATIENT
Start: 2021-07-24 | End: 2021-07-30 | Stop reason: HOSPADM

## 2021-07-24 RX ORDER — MEPERIDINE HYDROCHLORIDE 25 MG/ML
25 INJECTION INTRAMUSCULAR; INTRAVENOUS; SUBCUTANEOUS ONCE
Status: DISCONTINUED | OUTPATIENT
Start: 2021-07-24 | End: 2021-07-26

## 2021-07-24 RX ADMIN — SODIUM CHLORIDE 500 ML: 9 INJECTION, SOLUTION INTRAVENOUS at 09:41

## 2021-07-24 RX ADMIN — ACETAMINOPHEN 650 MG: 325 TABLET ORAL at 09:35

## 2021-07-24 RX ADMIN — AMITRIPTYLINE HYDROCHLORIDE 25 MG: 25 TABLET, FILM COATED ORAL at 20:08

## 2021-07-24 RX ADMIN — FERROUS SULFATE TAB 325 MG (65 MG ELEMENTAL FE) 325 MG: 325 (65 FE) TAB at 09:36

## 2021-07-24 RX ADMIN — Medication 5000 UNITS: at 09:35

## 2021-07-24 RX ADMIN — DIPHENHYDRAMINE HYDROCHLORIDE 50 MG: 50 CAPSULE ORAL at 10:07

## 2021-07-24 RX ADMIN — SERTRALINE HYDROCHLORIDE 100 MG: 100 TABLET ORAL at 09:36

## 2021-07-24 RX ADMIN — ONDANSETRON 4 MG: 2 INJECTION INTRAMUSCULAR; INTRAVENOUS at 10:07

## 2021-07-24 RX ADMIN — OXYCODONE HYDROCHLORIDE AND ACETAMINOPHEN 250 MG: 500 TABLET ORAL at 09:35

## 2021-07-24 RX ADMIN — ACETAMINOPHEN 650 MG: 325 TABLET ORAL at 18:15

## 2021-07-24 RX ADMIN — AMPHOTERICIN B 240 MG: 50 INJECTION, POWDER, LYOPHILIZED, FOR SOLUTION INTRAVENOUS at 10:39

## 2021-07-24 RX ADMIN — PANTOPRAZOLE SODIUM 40 MG: 40 TABLET, DELAYED RELEASE ORAL at 06:17

## 2021-07-25 LAB
ANION GAP SERPL CALCULATED.3IONS-SCNC: 10 MMOL/L (ref 5–15)
BUN SERPL-MCNC: 11 MG/DL (ref 6–20)
BUN/CREAT SERPL: 15.3 (ref 7–25)
CALCIUM SPEC-SCNC: 8.9 MG/DL (ref 8.6–10.5)
CHLORIDE SERPL-SCNC: 105 MMOL/L (ref 98–107)
CO2 SERPL-SCNC: 22 MMOL/L (ref 22–29)
CREAT SERPL-MCNC: 0.72 MG/DL (ref 0.57–1)
DEPRECATED RDW RBC AUTO: 43.8 FL (ref 37–54)
ERYTHROCYTE [DISTWIDTH] IN BLOOD BY AUTOMATED COUNT: 14.4 % (ref 12.3–15.4)
GFR SERPL CREATININE-BSD FRML MDRD: 88 ML/MIN/1.73
GLUCOSE SERPL-MCNC: 86 MG/DL (ref 65–99)
HCT VFR BLD AUTO: 29.8 % (ref 34–46.6)
HGB BLD-MCNC: 9.9 G/DL (ref 12–15.9)
IGA SERPL-MCNC: 247 MG/DL (ref 87–352)
IGE SERPL-ACNC: 151 IU/ML (ref 6–495)
IGG SERPL-MCNC: 1138 MG/DL (ref 586–1602)
IGM SERPL-MCNC: 253 MG/DL (ref 26–217)
MAGNESIUM SERPL-MCNC: 1.8 MG/DL (ref 1.6–2.6)
MCH RBC QN AUTO: 28.7 PG (ref 26.6–33)
MCHC RBC AUTO-ENTMCNC: 33.2 G/DL (ref 31.5–35.7)
MCV RBC AUTO: 86.4 FL (ref 79–97)
PHOSPHATE SERPL-MCNC: 5.7 MG/DL (ref 2.5–4.5)
PLATELET # BLD AUTO: 121 10*3/MM3 (ref 140–450)
PMV BLD AUTO: 10.9 FL (ref 6–12)
POTASSIUM SERPL-SCNC: 4.1 MMOL/L (ref 3.5–5.2)
RBC # BLD AUTO: 3.45 10*6/MM3 (ref 3.77–5.28)
SODIUM SERPL-SCNC: 137 MMOL/L (ref 136–145)
WBC # BLD AUTO: 1.85 10*3/MM3 (ref 3.4–10.8)

## 2021-07-25 PROCEDURE — 94799 UNLISTED PULMONARY SVC/PX: CPT

## 2021-07-25 PROCEDURE — 25010000002 ONDANSETRON PER 1 MG: Performed by: INTERNAL MEDICINE

## 2021-07-25 PROCEDURE — 83735 ASSAY OF MAGNESIUM: CPT | Performed by: INTERNAL MEDICINE

## 2021-07-25 PROCEDURE — 99232 SBSQ HOSP IP/OBS MODERATE 35: CPT | Performed by: HOSPITALIST

## 2021-07-25 PROCEDURE — 85027 COMPLETE CBC AUTOMATED: CPT | Performed by: HOSPITALIST

## 2021-07-25 PROCEDURE — 63710000001 DIPHENHYDRAMINE PER 50 MG: Performed by: INTERNAL MEDICINE

## 2021-07-25 PROCEDURE — 25010000002 AMPHOTERICIN B LIPOSOME PER 10 MG: Performed by: INTERNAL MEDICINE

## 2021-07-25 PROCEDURE — 84100 ASSAY OF PHOSPHORUS: CPT | Performed by: INTERNAL MEDICINE

## 2021-07-25 PROCEDURE — 80048 BASIC METABOLIC PNL TOTAL CA: CPT | Performed by: INTERNAL MEDICINE

## 2021-07-25 RX ORDER — BENZONATATE 100 MG/1
100 CAPSULE ORAL 3 TIMES DAILY PRN
Status: DISCONTINUED | OUTPATIENT
Start: 2021-07-25 | End: 2021-07-30 | Stop reason: HOSPADM

## 2021-07-25 RX ORDER — GUAIFENESIN 600 MG/1
600 TABLET, EXTENDED RELEASE ORAL EVERY 12 HOURS SCHEDULED
Status: DISCONTINUED | OUTPATIENT
Start: 2021-07-25 | End: 2021-07-30 | Stop reason: HOSPADM

## 2021-07-25 RX ADMIN — ACETAMINOPHEN 650 MG: 325 TABLET ORAL at 08:36

## 2021-07-25 RX ADMIN — FERROUS SULFATE TAB 325 MG (65 MG ELEMENTAL FE) 325 MG: 325 (65 FE) TAB at 08:35

## 2021-07-25 RX ADMIN — ACETAMINOPHEN 650 MG: 325 TABLET ORAL at 16:30

## 2021-07-25 RX ADMIN — AMPHOTERICIN B 240 MG: 50 INJECTION, POWDER, LYOPHILIZED, FOR SOLUTION INTRAVENOUS at 09:14

## 2021-07-25 RX ADMIN — BENZONATATE 100 MG: 100 CAPSULE ORAL at 20:12

## 2021-07-25 RX ADMIN — SERTRALINE HYDROCHLORIDE 100 MG: 100 TABLET ORAL at 08:35

## 2021-07-25 RX ADMIN — AMITRIPTYLINE HYDROCHLORIDE 25 MG: 25 TABLET, FILM COATED ORAL at 20:12

## 2021-07-25 RX ADMIN — BENZONATATE 100 MG: 100 CAPSULE ORAL at 09:26

## 2021-07-25 RX ADMIN — OXYCODONE HYDROCHLORIDE AND ACETAMINOPHEN 250 MG: 500 TABLET ORAL at 08:34

## 2021-07-25 RX ADMIN — SODIUM CHLORIDE 500 ML: 9 INJECTION, SOLUTION INTRAVENOUS at 07:55

## 2021-07-25 RX ADMIN — Medication 5000 UNITS: at 08:34

## 2021-07-25 RX ADMIN — DIPHENHYDRAMINE HYDROCHLORIDE 50 MG: 50 CAPSULE ORAL at 08:35

## 2021-07-25 RX ADMIN — ONDANSETRON 4 MG: 2 INJECTION INTRAMUSCULAR; INTRAVENOUS at 08:36

## 2021-07-26 ENCOUNTER — APPOINTMENT (OUTPATIENT)
Dept: GENERAL RADIOLOGY | Facility: HOSPITAL | Age: 46
End: 2021-07-26

## 2021-07-26 LAB
A PHAGOCYTOPH IGG TITR SER IF: NEGATIVE {TITER}
A PHAGOCYTOPH IGM TITR SER IF: NEGATIVE {TITER}
ALBUMIN SERPL-MCNC: 2.9 G/DL (ref 3.5–5.2)
ALBUMIN/GLOB SERPL: 1 G/DL
ALP SERPL-CCNC: 135 U/L (ref 39–117)
ALT SERPL W P-5'-P-CCNC: 43 U/L (ref 1–33)
ANION GAP SERPL CALCULATED.3IONS-SCNC: 12 MMOL/L (ref 5–15)
AST SERPL-CCNC: 36 U/L (ref 1–32)
B-HCG UR QL: NEGATIVE
BASOPHILS # BLD AUTO: 0 X10E3/UL (ref 0–0.2)
BASOPHILS # BLD MANUAL: 0 10*3/MM3 (ref 0–0.2)
BASOPHILS NFR BLD AUTO: 0 % (ref 0–1.5)
BASOPHILS NFR BLD AUTO: 2 %
BILIRUB SERPL-MCNC: 0.3 MG/DL (ref 0–1.2)
BUN SERPL-MCNC: 10 MG/DL (ref 6–20)
BUN/CREAT SERPL: 14.5 (ref 7–25)
CALCIUM SPEC-SCNC: 9.3 MG/DL (ref 8.6–10.5)
CD3+CD4+ CELLS # BLD: 125 /UL (ref 359–1519)
CD3+CD4+ CELLS NFR BLD: 62.6 % (ref 30.8–58.5)
CHLORIDE SERPL-SCNC: 101 MMOL/L (ref 98–107)
CO2 SERPL-SCNC: 22 MMOL/L (ref 22–29)
CREAT SERPL-MCNC: 0.69 MG/DL (ref 0.57–1)
CYTO UR: NORMAL
DEPRECATED RDW RBC AUTO: 42.6 FL (ref 37–54)
E CHAFFEENSIS IGG TITR SER IF: NEGATIVE {TITER}
E CHAFFEENSIS IGM TITR SER IF: NEGATIVE {TITER}
EOSINOPHIL # BLD AUTO: 0 X10E3/UL (ref 0–0.4)
EOSINOPHIL # BLD MANUAL: 0 10*3/MM3 (ref 0–0.4)
EOSINOPHIL NFR BLD AUTO: 0 %
EOSINOPHIL NFR BLD MANUAL: 0 % (ref 0.3–6.2)
ERYTHROCYTE [DISTWIDTH] IN BLOOD BY AUTOMATED COUNT: 13.9 % (ref 12.3–15.4)
ERYTHROCYTE [DISTWIDTH] IN BLOOD BY AUTOMATED COUNT: 14.1 % (ref 11.7–15.4)
GFR SERPL CREATININE-BSD FRML MDRD: 93 ML/MIN/1.73
GLOBULIN UR ELPH-MCNC: 3 GM/DL
GLUCOSE SERPL-MCNC: 89 MG/DL (ref 65–99)
HCT VFR BLD AUTO: 29.6 % (ref 34–46.6)
HCT VFR BLD AUTO: 31.7 % (ref 34–46.6)
HGB BLD-MCNC: 10.6 G/DL (ref 11.1–15.9)
HGB BLD-MCNC: 9.8 G/DL (ref 12–15.9)
IMMATURE CELLS: ABNORMAL
LAB AP CASE REPORT: NORMAL
LAB AP CLINICAL INFORMATION: NORMAL
LAB AP CYTOGENETICS REPORT,ADDENDUM: NORMAL
LAB AP DIAGNOSIS COMMENT: NORMAL
LAB AP FLOW CYTOMETRY SUMMARY: NORMAL
LYMPHOCYTES # BLD AUTO: 0.2 X10E3/UL (ref 0.7–3.1)
LYMPHOCYTES # BLD MANUAL: 0.76 10*3/MM3 (ref 0.7–3.1)
LYMPHOCYTES NFR BLD AUTO: 10 %
LYMPHOCYTES NFR BLD MANUAL: 35 % (ref 19.6–45.3)
LYMPHOCYTES NFR BLD MANUAL: 8 % (ref 5–12)
Lab: NORMAL
MAGNESIUM SERPL-MCNC: 1.6 MG/DL (ref 1.6–2.6)
MCH RBC QN AUTO: 28.1 PG (ref 26.6–33)
MCH RBC QN AUTO: 28.1 PG (ref 26.6–33)
MCHC RBC AUTO-ENTMCNC: 33.1 G/DL (ref 31.5–35.7)
MCHC RBC AUTO-ENTMCNC: 33.4 G/DL (ref 31.5–35.7)
MCV RBC AUTO: 84 FL (ref 79–97)
MCV RBC AUTO: 84.8 FL (ref 79–97)
METAMYELOCYTES NFR BLD MANUAL: 2 % (ref 0–0)
MONOCYTES # BLD AUTO: 0.16 10*3/MM3 (ref 0.1–0.9)
MONOCYTES # BLD AUTO: 0.2 X10E3/UL (ref 0.1–0.9)
MONOCYTES NFR BLD AUTO: 9 %
MORPHOLOGY BLD-IMP: ABNORMAL
MVISTA(R) BLASTOMYCES AG: 2.82 NG/ML
MYELOCYTES NFR BLD: 1 % (ref 0–0)
NEUTROPHILS # BLD AUTO: 0.99 10*3/MM3 (ref 1.7–7)
NEUTROPHILS # BLD AUTO: 1.4 X10E3/UL (ref 1.4–7)
NEUTROPHILS NFR BLD AUTO: 78 %
NEUTROPHILS NFR BLD MANUAL: 35 % (ref 42.7–76)
NEUTS BAND NFR BLD MANUAL: 16 % (ref 0–5)
PATH REPORT.FINAL DX SPEC: NORMAL
PATH REPORT.GROSS SPEC: NORMAL
PHOSPHATE SERPL-MCNC: 5.9 MG/DL (ref 2.5–4.5)
PLAT MORPH BLD: NORMAL
PLATELET # BLD AUTO: 118 10*3/MM3 (ref 140–450)
PLATELET # BLD AUTO: 125 X10E3/UL (ref 150–450)
PMV BLD AUTO: 10.1 FL (ref 6–12)
POTASSIUM SERPL-SCNC: 4.1 MMOL/L (ref 3.5–5.2)
PROT SERPL-MCNC: 5.9 G/DL (ref 6–8.5)
RBC # BLD AUTO: 3.49 10*6/MM3 (ref 3.77–5.28)
RBC # BLD AUTO: 3.77 X10E6/UL (ref 3.77–5.28)
RBC MORPH BLD: NORMAL
SMUDGE CELLS BLD QL SMEAR: ABNORMAL
SODIUM SERPL-SCNC: 135 MMOL/L (ref 136–145)
SPECIMEN SOURCE: NORMAL
VARIANT LYMPHS NFR BLD MANUAL: 4 % (ref 0–5)
WBC # BLD AUTO: 1.8 X10E3/UL (ref 3.4–10.8)
WBC # BLD AUTO: 1.94 10*3/MM3 (ref 3.4–10.8)

## 2021-07-26 PROCEDURE — 81025 URINE PREGNANCY TEST: CPT | Performed by: INTERNAL MEDICINE

## 2021-07-26 PROCEDURE — 80053 COMPREHEN METABOLIC PANEL: CPT | Performed by: HOSPITALIST

## 2021-07-26 PROCEDURE — 85025 COMPLETE CBC W/AUTO DIFF WBC: CPT | Performed by: HOSPITALIST

## 2021-07-26 PROCEDURE — 25010000003 MAGNESIUM SULFATE 4 GM/100ML SOLUTION: Performed by: PHYSICIAN ASSISTANT

## 2021-07-26 PROCEDURE — 25010000002 ONDANSETRON PER 1 MG: Performed by: INTERNAL MEDICINE

## 2021-07-26 PROCEDURE — 99232 SBSQ HOSP IP/OBS MODERATE 35: CPT | Performed by: PHYSICIAN ASSISTANT

## 2021-07-26 PROCEDURE — 83735 ASSAY OF MAGNESIUM: CPT | Performed by: INTERNAL MEDICINE

## 2021-07-26 PROCEDURE — 25010000002 AMPHOTERICIN B LIPOSOME PER 10 MG: Performed by: INTERNAL MEDICINE

## 2021-07-26 PROCEDURE — 85007 BL SMEAR W/DIFF WBC COUNT: CPT | Performed by: HOSPITALIST

## 2021-07-26 PROCEDURE — 84100 ASSAY OF PHOSPHORUS: CPT | Performed by: PHYSICIAN ASSISTANT

## 2021-07-26 PROCEDURE — 63710000001 DIPHENHYDRAMINE PER 50 MG: Performed by: INTERNAL MEDICINE

## 2021-07-26 PROCEDURE — 71046 X-RAY EXAM CHEST 2 VIEWS: CPT

## 2021-07-26 RX ORDER — MAGNESIUM SULFATE HEPTAHYDRATE 40 MG/ML
2 INJECTION, SOLUTION INTRAVENOUS AS NEEDED
Status: DISCONTINUED | OUTPATIENT
Start: 2021-07-26 | End: 2021-07-30 | Stop reason: HOSPADM

## 2021-07-26 RX ORDER — MAGNESIUM SULFATE HEPTAHYDRATE 40 MG/ML
4 INJECTION, SOLUTION INTRAVENOUS AS NEEDED
Status: DISCONTINUED | OUTPATIENT
Start: 2021-07-26 | End: 2021-07-30 | Stop reason: HOSPADM

## 2021-07-26 RX ADMIN — OXYCODONE HYDROCHLORIDE AND ACETAMINOPHEN 250 MG: 500 TABLET ORAL at 09:18

## 2021-07-26 RX ADMIN — ACETAMINOPHEN 650 MG: 325 TABLET ORAL at 17:12

## 2021-07-26 RX ADMIN — MAGNESIUM SULFATE HEPTAHYDRATE 4 G: 40 INJECTION, SOLUTION INTRAVENOUS at 13:55

## 2021-07-26 RX ADMIN — FERROUS SULFATE TAB 325 MG (65 MG ELEMENTAL FE) 325 MG: 325 (65 FE) TAB at 09:18

## 2021-07-26 RX ADMIN — PANTOPRAZOLE SODIUM 40 MG: 40 TABLET, DELAYED RELEASE ORAL at 06:00

## 2021-07-26 RX ADMIN — ACETAMINOPHEN 650 MG: 325 TABLET ORAL at 09:18

## 2021-07-26 RX ADMIN — SODIUM CHLORIDE 500 ML: 9 INJECTION, SOLUTION INTRAVENOUS at 08:41

## 2021-07-26 RX ADMIN — Medication 5000 UNITS: at 09:18

## 2021-07-26 RX ADMIN — AMPHOTERICIN B 240 MG: 50 INJECTION, POWDER, LYOPHILIZED, FOR SOLUTION INTRAVENOUS at 09:51

## 2021-07-26 RX ADMIN — SERTRALINE HYDROCHLORIDE 100 MG: 100 TABLET ORAL at 09:19

## 2021-07-26 RX ADMIN — DIPHENHYDRAMINE HYDROCHLORIDE 50 MG: 50 CAPSULE ORAL at 09:17

## 2021-07-26 RX ADMIN — AMITRIPTYLINE HYDROCHLORIDE 25 MG: 25 TABLET, FILM COATED ORAL at 20:20

## 2021-07-26 RX ADMIN — ONDANSETRON 4 MG: 2 INJECTION INTRAMUSCULAR; INTRAVENOUS at 09:18

## 2021-07-26 RX ADMIN — BENZONATATE 100 MG: 100 CAPSULE ORAL at 20:20

## 2021-07-27 ENCOUNTER — APPOINTMENT (OUTPATIENT)
Dept: NUCLEAR MEDICINE | Facility: HOSPITAL | Age: 46
End: 2021-07-27

## 2021-07-27 ENCOUNTER — APPOINTMENT (OUTPATIENT)
Dept: MRI IMAGING | Facility: HOSPITAL | Age: 46
End: 2021-07-27

## 2021-07-27 ENCOUNTER — APPOINTMENT (OUTPATIENT)
Dept: CT IMAGING | Facility: HOSPITAL | Age: 46
End: 2021-07-27

## 2021-07-27 LAB
ANION GAP SERPL CALCULATED.3IONS-SCNC: 10 MMOL/L (ref 5–15)
BASOPHILS # BLD MANUAL: 0.02 10*3/MM3 (ref 0–0.2)
BASOPHILS NFR BLD AUTO: 1 % (ref 0–1.5)
BUN SERPL-MCNC: 11 MG/DL (ref 6–20)
BUN/CREAT SERPL: 15.1 (ref 7–25)
CALCIUM SPEC-SCNC: 9.6 MG/DL (ref 8.6–10.5)
CHLORIDE SERPL-SCNC: 99 MMOL/L (ref 98–107)
CO2 SERPL-SCNC: 24 MMOL/L (ref 22–29)
CREAT SERPL-MCNC: 0.73 MG/DL (ref 0.57–1)
DEPRECATED RDW RBC AUTO: 43 FL (ref 37–54)
EOSINOPHIL # BLD MANUAL: 0.04 10*3/MM3 (ref 0–0.4)
EOSINOPHIL NFR BLD MANUAL: 2 % (ref 0.3–6.2)
ERYTHROCYTE [DISTWIDTH] IN BLOOD BY AUTOMATED COUNT: 13.7 % (ref 12.3–15.4)
GFR SERPL CREATININE-BSD FRML MDRD: 87 ML/MIN/1.73
GLUCOSE SERPL-MCNC: 130 MG/DL (ref 65–99)
H CAPSUL AG SER IA-MCNC: 1.5 NG/ML
H CAPSUL AG SER QL IA: POSITIVE
HCT VFR BLD AUTO: 28.5 % (ref 34–46.6)
HGB BLD-MCNC: 9.3 G/DL (ref 12–15.9)
LYMPHOCYTES # BLD MANUAL: 0.41 10*3/MM3 (ref 0.7–3.1)
LYMPHOCYTES NFR BLD MANUAL: 10 % (ref 5–12)
LYMPHOCYTES NFR BLD MANUAL: 15 % (ref 19.6–45.3)
Lab: ABNORMAL
MAGNESIUM SERPL-MCNC: 1.7 MG/DL (ref 1.6–2.6)
MCH RBC QN AUTO: 28.5 PG (ref 26.6–33)
MCHC RBC AUTO-ENTMCNC: 32.6 G/DL (ref 31.5–35.7)
MCV RBC AUTO: 87.4 FL (ref 79–97)
METAMYELOCYTES NFR BLD MANUAL: 1 % (ref 0–0)
MONOCYTES # BLD AUTO: 0.22 10*3/MM3 (ref 0.1–0.9)
NEUTROPHILS # BLD AUTO: 1.45 10*3/MM3 (ref 1.7–7)
NEUTROPHILS NFR BLD MANUAL: 47 % (ref 42.7–76)
NEUTS BAND NFR BLD MANUAL: 20 % (ref 0–5)
PHOSPHATE SERPL-MCNC: 5.2 MG/DL (ref 2.5–4.5)
PLATELET # BLD AUTO: 112 10*3/MM3 (ref 140–450)
PMV BLD AUTO: 10 FL (ref 6–12)
POTASSIUM SERPL-SCNC: 3.8 MMOL/L (ref 3.5–5.2)
QT INTERVAL: 386 MS
QTC INTERVAL: 422 MS
RBC # BLD AUTO: 3.26 10*6/MM3 (ref 3.77–5.28)
RBC MORPH BLD: NORMAL
SMALL PLATELETS BLD QL SMEAR: ABNORMAL
SMUDGE CELLS BLD QL SMEAR: ABNORMAL
SODIUM SERPL-SCNC: 133 MMOL/L (ref 136–145)
VARIANT LYMPHS NFR BLD MANUAL: 4 % (ref 0–5)
WBC # BLD AUTO: 2.17 10*3/MM3 (ref 3.4–10.8)

## 2021-07-27 PROCEDURE — 70553 MRI BRAIN STEM W/O & W/DYE: CPT

## 2021-07-27 PROCEDURE — 63710000001 DIPHENHYDRAMINE PER 50 MG: Performed by: INTERNAL MEDICINE

## 2021-07-27 PROCEDURE — A9577 INJ MULTIHANCE: HCPCS | Performed by: HOSPITALIST

## 2021-07-27 PROCEDURE — 78306 BONE IMAGING WHOLE BODY: CPT

## 2021-07-27 PROCEDURE — 25010000002 ONDANSETRON PER 1 MG: Performed by: INTERNAL MEDICINE

## 2021-07-27 PROCEDURE — 93005 ELECTROCARDIOGRAM TRACING: CPT | Performed by: PHYSICIAN ASSISTANT

## 2021-07-27 PROCEDURE — 78315 BONE IMAGING 3 PHASE: CPT

## 2021-07-27 PROCEDURE — 0 GADOBENATE DIMEGLUMINE 529 MG/ML SOLUTION: Performed by: HOSPITALIST

## 2021-07-27 PROCEDURE — 83735 ASSAY OF MAGNESIUM: CPT | Performed by: INTERNAL MEDICINE

## 2021-07-27 PROCEDURE — 93010 ELECTROCARDIOGRAM REPORT: CPT | Performed by: INTERNAL MEDICINE

## 2021-07-27 PROCEDURE — 85007 BL SMEAR W/DIFF WBC COUNT: CPT | Performed by: PHYSICIAN ASSISTANT

## 2021-07-27 PROCEDURE — 80048 BASIC METABOLIC PNL TOTAL CA: CPT | Performed by: INTERNAL MEDICINE

## 2021-07-27 PROCEDURE — 85025 COMPLETE CBC W/AUTO DIFF WBC: CPT | Performed by: PHYSICIAN ASSISTANT

## 2021-07-27 PROCEDURE — A9503 TC99M MEDRONATE: HCPCS | Performed by: HOSPITALIST

## 2021-07-27 PROCEDURE — 25010000003 MAGNESIUM SULFATE 4 GM/100ML SOLUTION: Performed by: PHYSICIAN ASSISTANT

## 2021-07-27 PROCEDURE — 0 TECHNETIUM MEDRONATE KIT: Performed by: HOSPITALIST

## 2021-07-27 PROCEDURE — 25010000002 AMPHOTERICIN B LIPOSOME PER 10 MG: Performed by: INTERNAL MEDICINE

## 2021-07-27 PROCEDURE — 99233 SBSQ HOSP IP/OBS HIGH 50: CPT | Performed by: PHYSICIAN ASSISTANT

## 2021-07-27 PROCEDURE — 71250 CT THORAX DX C-: CPT

## 2021-07-27 PROCEDURE — 84100 ASSAY OF PHOSPHORUS: CPT | Performed by: PHYSICIAN ASSISTANT

## 2021-07-27 RX ORDER — TC 99M MEDRONATE 20 MG/10ML
23.9 INJECTION, POWDER, LYOPHILIZED, FOR SOLUTION INTRAVENOUS
Status: COMPLETED | OUTPATIENT
Start: 2021-07-27 | End: 2021-07-27

## 2021-07-27 RX ORDER — TEMAZEPAM 15 MG/1
15 CAPSULE ORAL NIGHTLY
Status: DISCONTINUED | OUTPATIENT
Start: 2021-07-27 | End: 2021-07-28

## 2021-07-27 RX ADMIN — DIPHENHYDRAMINE HYDROCHLORIDE 50 MG: 50 CAPSULE ORAL at 08:47

## 2021-07-27 RX ADMIN — SERTRALINE HYDROCHLORIDE 100 MG: 100 TABLET ORAL at 08:14

## 2021-07-27 RX ADMIN — Medication 23.9 MILLICURIE: at 12:10

## 2021-07-27 RX ADMIN — ACETAMINOPHEN 650 MG: 325 TABLET ORAL at 08:47

## 2021-07-27 RX ADMIN — AMITRIPTYLINE HYDROCHLORIDE 25 MG: 25 TABLET, FILM COATED ORAL at 20:49

## 2021-07-27 RX ADMIN — FERROUS SULFATE TAB 325 MG (65 MG ELEMENTAL FE) 325 MG: 325 (65 FE) TAB at 08:14

## 2021-07-27 RX ADMIN — GADOBENATE DIMEGLUMINE 15 ML: 529 INJECTION, SOLUTION INTRAVENOUS at 04:00

## 2021-07-27 RX ADMIN — ITRACONAZOLE 2 CAPSULE: 65 CAPSULE, GELATIN COATED ORAL at 20:49

## 2021-07-27 RX ADMIN — AMPHOTERICIN B 240 MG: 50 INJECTION, POWDER, LYOPHILIZED, FOR SOLUTION INTRAVENOUS at 09:26

## 2021-07-27 RX ADMIN — SODIUM CHLORIDE 500 ML: 9 INJECTION, SOLUTION INTRAVENOUS at 08:10

## 2021-07-27 RX ADMIN — GUAIFENESIN 600 MG: 600 TABLET, EXTENDED RELEASE ORAL at 20:50

## 2021-07-27 RX ADMIN — MAGNESIUM SULFATE HEPTAHYDRATE 4 G: 40 INJECTION, SOLUTION INTRAVENOUS at 14:15

## 2021-07-27 RX ADMIN — TEMAZEPAM 15 MG: 15 CAPSULE ORAL at 20:49

## 2021-07-27 RX ADMIN — OXYCODONE HYDROCHLORIDE AND ACETAMINOPHEN 250 MG: 500 TABLET ORAL at 08:13

## 2021-07-27 RX ADMIN — Medication 5000 UNITS: at 08:14

## 2021-07-27 RX ADMIN — Medication: at 20:50

## 2021-07-27 RX ADMIN — PANTOPRAZOLE SODIUM 40 MG: 40 TABLET, DELAYED RELEASE ORAL at 06:03

## 2021-07-27 RX ADMIN — ONDANSETRON 4 MG: 2 INJECTION INTRAMUSCULAR; INTRAVENOUS at 08:47

## 2021-07-27 RX ADMIN — ITRACONAZOLE 2 CAPSULE: 65 CAPSULE, GELATIN COATED ORAL at 16:43

## 2021-07-28 LAB
ANION GAP SERPL CALCULATED.3IONS-SCNC: 12 MMOL/L (ref 5–15)
BASOPHILS # BLD AUTO: 0.01 10*3/MM3 (ref 0–0.2)
BASOPHILS NFR BLD AUTO: 0.5 % (ref 0–1.5)
BUN SERPL-MCNC: 13 MG/DL (ref 6–20)
BUN/CREAT SERPL: 18.1 (ref 7–25)
CALCIUM SPEC-SCNC: 8.7 MG/DL (ref 8.6–10.5)
CHLORIDE SERPL-SCNC: 103 MMOL/L (ref 98–107)
CLUMPED PLATELETS: PRESENT
CO2 SERPL-SCNC: 22 MMOL/L (ref 22–29)
CREAT SERPL-MCNC: 0.72 MG/DL (ref 0.57–1)
DEPRECATED RDW RBC AUTO: 43.2 FL (ref 37–54)
EOSINOPHIL # BLD AUTO: 0.11 10*3/MM3 (ref 0–0.4)
EOSINOPHIL NFR BLD AUTO: 5 % (ref 0.3–6.2)
ERYTHROCYTE [DISTWIDTH] IN BLOOD BY AUTOMATED COUNT: 13.7 % (ref 12.3–15.4)
GFR SERPL CREATININE-BSD FRML MDRD: 88 ML/MIN/1.73
GLUCOSE SERPL-MCNC: 111 MG/DL (ref 65–99)
HCT VFR BLD AUTO: 27.1 % (ref 34–46.6)
HGB BLD-MCNC: 8.7 G/DL (ref 12–15.9)
IMM GRANULOCYTES # BLD AUTO: 0.06 10*3/MM3 (ref 0–0.05)
IMM GRANULOCYTES NFR BLD AUTO: 2.7 % (ref 0–0.5)
LYMPHOCYTES # BLD AUTO: 0.55 10*3/MM3 (ref 0.7–3.1)
LYMPHOCYTES NFR BLD AUTO: 25 % (ref 19.6–45.3)
MAGNESIUM SERPL-MCNC: 1.9 MG/DL (ref 1.6–2.6)
MCH RBC QN AUTO: 28.3 PG (ref 26.6–33)
MCHC RBC AUTO-ENTMCNC: 32.1 G/DL (ref 31.5–35.7)
MCV RBC AUTO: 88.3 FL (ref 79–97)
MONOCYTES # BLD AUTO: 0.24 10*3/MM3 (ref 0.1–0.9)
MONOCYTES NFR BLD AUTO: 10.9 % (ref 5–12)
NEUTROPHILS NFR BLD AUTO: 1.23 10*3/MM3 (ref 1.7–7)
NEUTROPHILS NFR BLD AUTO: 55.9 % (ref 42.7–76)
NRBC BLD AUTO-RTO: 0 /100 WBC (ref 0–0.2)
PHOSPHATE SERPL-MCNC: 4.8 MG/DL (ref 2.5–4.5)
PLATELET # BLD AUTO: 104 10*3/MM3 (ref 140–450)
PMV BLD AUTO: 10.3 FL (ref 6–12)
POTASSIUM SERPL-SCNC: 3.8 MMOL/L (ref 3.5–5.2)
PTH-INTACT SERPL-MCNC: 6.8 PG/ML (ref 15–65)
RBC # BLD AUTO: 3.07 10*6/MM3 (ref 3.77–5.28)
RBC MORPH BLD: NORMAL
SODIUM SERPL-SCNC: 137 MMOL/L (ref 136–145)
WBC # BLD AUTO: 2.2 10*3/MM3 (ref 3.4–10.8)
WBC MORPH BLD: NORMAL

## 2021-07-28 PROCEDURE — 84100 ASSAY OF PHOSPHORUS: CPT | Performed by: PHYSICIAN ASSISTANT

## 2021-07-28 PROCEDURE — 80048 BASIC METABOLIC PNL TOTAL CA: CPT | Performed by: INTERNAL MEDICINE

## 2021-07-28 PROCEDURE — 85007 BL SMEAR W/DIFF WBC COUNT: CPT | Performed by: PHYSICIAN ASSISTANT

## 2021-07-28 PROCEDURE — 83735 ASSAY OF MAGNESIUM: CPT | Performed by: INTERNAL MEDICINE

## 2021-07-28 PROCEDURE — 25010000003 MAGNESIUM SULFATE 4 GM/100ML SOLUTION: Performed by: PHYSICIAN ASSISTANT

## 2021-07-28 PROCEDURE — 63710000001 DIPHENHYDRAMINE PER 50 MG: Performed by: INTERNAL MEDICINE

## 2021-07-28 PROCEDURE — 85025 COMPLETE CBC W/AUTO DIFF WBC: CPT | Performed by: PHYSICIAN ASSISTANT

## 2021-07-28 PROCEDURE — 25010000002 AMPHOTERICIN B LIPOSOME PER 10 MG: Performed by: INTERNAL MEDICINE

## 2021-07-28 PROCEDURE — 25010000002 ONDANSETRON PER 1 MG: Performed by: INTERNAL MEDICINE

## 2021-07-28 PROCEDURE — 83970 ASSAY OF PARATHORMONE: CPT | Performed by: PHYSICIAN ASSISTANT

## 2021-07-28 PROCEDURE — 99232 SBSQ HOSP IP/OBS MODERATE 35: CPT | Performed by: PHYSICIAN ASSISTANT

## 2021-07-28 RX ORDER — OXYMETAZOLINE HYDROCHLORIDE 0.05 G/100ML
3 SPRAY NASAL 2 TIMES DAILY PRN
Status: DISCONTINUED | OUTPATIENT
Start: 2021-07-28 | End: 2021-07-30 | Stop reason: HOSPADM

## 2021-07-28 RX ORDER — DOCUSATE SODIUM 100 MG/1
200 CAPSULE, LIQUID FILLED ORAL DAILY
Status: DISCONTINUED | OUTPATIENT
Start: 2021-07-28 | End: 2021-07-30 | Stop reason: HOSPADM

## 2021-07-28 RX ORDER — TEMAZEPAM 15 MG/1
30 CAPSULE ORAL NIGHTLY
Status: DISCONTINUED | OUTPATIENT
Start: 2021-07-28 | End: 2021-07-30 | Stop reason: HOSPADM

## 2021-07-28 RX ORDER — SULFAMETHOXAZOLE AND TRIMETHOPRIM 800; 160 MG/1; MG/1
1 TABLET ORAL 3 TIMES WEEKLY
Status: DISCONTINUED | OUTPATIENT
Start: 2021-07-28 | End: 2021-07-30 | Stop reason: HOSPADM

## 2021-07-28 RX ADMIN — SERTRALINE HYDROCHLORIDE 100 MG: 100 TABLET ORAL at 08:59

## 2021-07-28 RX ADMIN — PANTOPRAZOLE SODIUM 40 MG: 40 TABLET, DELAYED RELEASE ORAL at 06:31

## 2021-07-28 RX ADMIN — MAGNESIUM SULFATE HEPTAHYDRATE 4 G: 40 INJECTION, SOLUTION INTRAVENOUS at 12:48

## 2021-07-28 RX ADMIN — Medication: at 21:05

## 2021-07-28 RX ADMIN — AMPHOTERICIN B 240 MG: 50 INJECTION, POWDER, LYOPHILIZED, FOR SOLUTION INTRAVENOUS at 10:32

## 2021-07-28 RX ADMIN — FERROUS SULFATE TAB 325 MG (65 MG ELEMENTAL FE) 325 MG: 325 (65 FE) TAB at 08:58

## 2021-07-28 RX ADMIN — SULFAMETHOXAZOLE AND TRIMETHOPRIM 160 MG: 800; 160 TABLET ORAL at 17:02

## 2021-07-28 RX ADMIN — ACETAMINOPHEN 650 MG: 325 TABLET ORAL at 08:58

## 2021-07-28 RX ADMIN — OXYCODONE HYDROCHLORIDE AND ACETAMINOPHEN 250 MG: 500 TABLET ORAL at 08:58

## 2021-07-28 RX ADMIN — DIPHENHYDRAMINE HYDROCHLORIDE 50 MG: 50 CAPSULE ORAL at 09:00

## 2021-07-28 RX ADMIN — ITRACONAZOLE 2 CAPSULE: 65 CAPSULE, GELATIN COATED ORAL at 09:00

## 2021-07-28 RX ADMIN — ONDANSETRON 4 MG: 2 INJECTION INTRAMUSCULAR; INTRAVENOUS at 09:39

## 2021-07-28 RX ADMIN — Medication 5000 UNITS: at 08:58

## 2021-07-28 RX ADMIN — AMITRIPTYLINE HYDROCHLORIDE 25 MG: 25 TABLET, FILM COATED ORAL at 21:05

## 2021-07-28 RX ADMIN — ITRACONAZOLE 2 CAPSULE: 65 CAPSULE, GELATIN COATED ORAL at 17:05

## 2021-07-28 RX ADMIN — TEMAZEPAM 30 MG: 15 CAPSULE ORAL at 21:05

## 2021-07-28 RX ADMIN — SODIUM CHLORIDE 500 ML: 9 INJECTION, SOLUTION INTRAVENOUS at 09:03

## 2021-07-28 RX ADMIN — ITRACONAZOLE 2 CAPSULE: 65 CAPSULE, GELATIN COATED ORAL at 21:05

## 2021-07-28 RX ADMIN — DOCUSATE SODIUM 200 MG: 100 CAPSULE, LIQUID FILLED ORAL at 12:48

## 2021-07-28 RX ADMIN — MAGNESIUM HYDROXIDE 10 ML: 2400 SUSPENSION ORAL at 12:48

## 2021-07-29 LAB
ANION GAP SERPL CALCULATED.3IONS-SCNC: 8 MMOL/L (ref 5–15)
BASOPHILS # BLD MANUAL: 0 10*3/MM3 (ref 0–0.2)
BASOPHILS NFR BLD AUTO: 0 % (ref 0–1.5)
BUN SERPL-MCNC: 12 MG/DL (ref 6–20)
BUN/CREAT SERPL: 15.8 (ref 7–25)
CALCIUM SPEC-SCNC: 8.5 MG/DL (ref 8.6–10.5)
CHLORIDE SERPL-SCNC: 105 MMOL/L (ref 98–107)
CO2 SERPL-SCNC: 26 MMOL/L (ref 22–29)
COCCIDIOIDES AB TITR SER CF: NORMAL {TITER}
CREAT SERPL-MCNC: 0.76 MG/DL (ref 0.57–1)
DEPRECATED RDW RBC AUTO: 43.6 FL (ref 37–54)
EOSINOPHIL # BLD MANUAL: 0.1 10*3/MM3 (ref 0–0.4)
EOSINOPHIL NFR BLD MANUAL: 4 % (ref 0.3–6.2)
ERYTHROCYTE [DISTWIDTH] IN BLOOD BY AUTOMATED COUNT: 14 % (ref 12.3–15.4)
GFR SERPL CREATININE-BSD FRML MDRD: 83 ML/MIN/1.73
GLUCOSE SERPL-MCNC: 86 MG/DL (ref 65–99)
HCT VFR BLD AUTO: 26.6 % (ref 34–46.6)
HGB BLD-MCNC: 8.7 G/DL (ref 12–15.9)
LYMPHOCYTES # BLD MANUAL: 0.59 10*3/MM3 (ref 0.7–3.1)
LYMPHOCYTES NFR BLD MANUAL: 24 % (ref 19.6–45.3)
LYMPHOCYTES NFR BLD MANUAL: 6 % (ref 5–12)
MAGNESIUM SERPL-MCNC: 2.2 MG/DL (ref 1.6–2.6)
MCH RBC QN AUTO: 28.3 PG (ref 26.6–33)
MCHC RBC AUTO-ENTMCNC: 32.7 G/DL (ref 31.5–35.7)
MCV RBC AUTO: 86.6 FL (ref 79–97)
MONOCYTES # BLD AUTO: 0.15 10*3/MM3 (ref 0.1–0.9)
NEUTROPHILS # BLD AUTO: 1.61 10*3/MM3 (ref 1.7–7)
NEUTROPHILS NFR BLD MANUAL: 50 % (ref 42.7–76)
NEUTS BAND NFR BLD MANUAL: 16 % (ref 0–5)
NRBC SPEC MANUAL: 0 /100 WBC (ref 0–0.2)
PHOSPHATE SERPL-MCNC: 4.3 MG/DL (ref 2.5–4.5)
PLAT MORPH BLD: NORMAL
PLATELET # BLD AUTO: 112 10*3/MM3 (ref 140–450)
PMV BLD AUTO: 9.8 FL (ref 6–12)
POTASSIUM SERPL-SCNC: 4.1 MMOL/L (ref 3.5–5.2)
RBC # BLD AUTO: 3.07 10*6/MM3 (ref 3.77–5.28)
RBC MORPH BLD: NORMAL
REF LAB TEST RESULTS: NORMAL
SODIUM SERPL-SCNC: 139 MMOL/L (ref 136–145)
WBC # BLD AUTO: 2.44 10*3/MM3 (ref 3.4–10.8)
WBC MORPH BLD: NORMAL

## 2021-07-29 PROCEDURE — 85025 COMPLETE CBC W/AUTO DIFF WBC: CPT | Performed by: PHYSICIAN ASSISTANT

## 2021-07-29 PROCEDURE — 99233 SBSQ HOSP IP/OBS HIGH 50: CPT | Performed by: INTERNAL MEDICINE

## 2021-07-29 PROCEDURE — 85007 BL SMEAR W/DIFF WBC COUNT: CPT | Performed by: PHYSICIAN ASSISTANT

## 2021-07-29 PROCEDURE — 94640 AIRWAY INHALATION TREATMENT: CPT

## 2021-07-29 PROCEDURE — 86778 TOXOPLASMA ANTIBODY IGM: CPT | Performed by: INTERNAL MEDICINE

## 2021-07-29 PROCEDURE — 63710000001 DIPHENHYDRAMINE PER 50 MG: Performed by: INTERNAL MEDICINE

## 2021-07-29 PROCEDURE — 94799 UNLISTED PULMONARY SVC/PX: CPT

## 2021-07-29 PROCEDURE — 25010000002 AMPHOTERICIN B LIPOSOME PER 10 MG: Performed by: INTERNAL MEDICINE

## 2021-07-29 PROCEDURE — 80048 BASIC METABOLIC PNL TOTAL CA: CPT | Performed by: INTERNAL MEDICINE

## 2021-07-29 PROCEDURE — 83735 ASSAY OF MAGNESIUM: CPT | Performed by: INTERNAL MEDICINE

## 2021-07-29 PROCEDURE — 25010000002 ONDANSETRON PER 1 MG: Performed by: INTERNAL MEDICINE

## 2021-07-29 PROCEDURE — 86777 TOXOPLASMA ANTIBODY: CPT | Performed by: INTERNAL MEDICINE

## 2021-07-29 PROCEDURE — 84100 ASSAY OF PHOSPHORUS: CPT | Performed by: PHYSICIAN ASSISTANT

## 2021-07-29 RX ORDER — BISACODYL 10 MG
10 SUPPOSITORY, RECTAL RECTAL DAILY PRN
Status: DISCONTINUED | OUTPATIENT
Start: 2021-07-29 | End: 2021-07-30 | Stop reason: HOSPADM

## 2021-07-29 RX ORDER — BISACODYL 5 MG/1
5 TABLET, DELAYED RELEASE ORAL DAILY PRN
Status: DISCONTINUED | OUTPATIENT
Start: 2021-07-29 | End: 2021-07-30 | Stop reason: HOSPADM

## 2021-07-29 RX ORDER — POLYETHYLENE GLYCOL 3350 17 G/17G
17 POWDER, FOR SOLUTION ORAL DAILY PRN
Status: DISCONTINUED | OUTPATIENT
Start: 2021-07-29 | End: 2021-07-30 | Stop reason: HOSPADM

## 2021-07-29 RX ORDER — AMOXICILLIN 250 MG
2 CAPSULE ORAL 2 TIMES DAILY
Status: DISCONTINUED | OUTPATIENT
Start: 2021-07-29 | End: 2021-07-30 | Stop reason: HOSPADM

## 2021-07-29 RX ORDER — SODIUM CHLORIDE FOR INHALATION 7 %
4 VIAL, NEBULIZER (ML) INHALATION ONCE
Status: COMPLETED | OUTPATIENT
Start: 2021-07-29 | End: 2021-07-29

## 2021-07-29 RX ADMIN — TEMAZEPAM 30 MG: 15 CAPSULE ORAL at 20:48

## 2021-07-29 RX ADMIN — DOCUSATE SODIUM 200 MG: 100 CAPSULE, LIQUID FILLED ORAL at 08:46

## 2021-07-29 RX ADMIN — OXYCODONE HYDROCHLORIDE AND ACETAMINOPHEN 250 MG: 500 TABLET ORAL at 08:46

## 2021-07-29 RX ADMIN — ONDANSETRON 4 MG: 2 INJECTION INTRAMUSCULAR; INTRAVENOUS at 09:38

## 2021-07-29 RX ADMIN — POLYETHYLENE GLYCOL 3350 17 G: 17 POWDER, FOR SOLUTION ORAL at 22:22

## 2021-07-29 RX ADMIN — AMITRIPTYLINE HYDROCHLORIDE 25 MG: 25 TABLET, FILM COATED ORAL at 20:48

## 2021-07-29 RX ADMIN — PANTOPRAZOLE SODIUM 40 MG: 40 TABLET, DELAYED RELEASE ORAL at 06:39

## 2021-07-29 RX ADMIN — DIPHENHYDRAMINE HYDROCHLORIDE 50 MG: 50 CAPSULE ORAL at 08:46

## 2021-07-29 RX ADMIN — SODIUM CHLORIDE 4 ML: 7 NEBU SOLN,3 % NEBU at 12:16

## 2021-07-29 RX ADMIN — ITRACONAZOLE 2 CAPSULE: 65 CAPSULE, GELATIN COATED ORAL at 08:47

## 2021-07-29 RX ADMIN — BENZONATATE 100 MG: 100 CAPSULE ORAL at 10:26

## 2021-07-29 RX ADMIN — AMPHOTERICIN B 240 MG: 50 INJECTION, POWDER, LYOPHILIZED, FOR SOLUTION INTRAVENOUS at 09:58

## 2021-07-29 RX ADMIN — SERTRALINE HYDROCHLORIDE 100 MG: 100 TABLET ORAL at 08:45

## 2021-07-29 RX ADMIN — ACETAMINOPHEN 650 MG: 325 TABLET ORAL at 08:45

## 2021-07-29 RX ADMIN — Medication: at 20:48

## 2021-07-29 RX ADMIN — ITRACONAZOLE 2 CAPSULE: 65 CAPSULE, GELATIN COATED ORAL at 16:54

## 2021-07-29 RX ADMIN — Medication 5000 UNITS: at 08:46

## 2021-07-29 RX ADMIN — SODIUM CHLORIDE 500 ML: 9 INJECTION, SOLUTION INTRAVENOUS at 08:46

## 2021-07-29 RX ADMIN — ITRACONAZOLE 2 CAPSULE: 65 CAPSULE, GELATIN COATED ORAL at 20:48

## 2021-07-29 RX ADMIN — DOCUSATE SODIUM 50MG AND SENNOSIDES 8.6MG 2 TABLET: 8.6; 5 TABLET, FILM COATED ORAL at 22:22

## 2021-07-29 RX ADMIN — FERROUS SULFATE TAB 325 MG (65 MG ELEMENTAL FE) 325 MG: 325 (65 FE) TAB at 08:46

## 2021-07-30 ENCOUNTER — READMISSION MANAGEMENT (OUTPATIENT)
Dept: CALL CENTER | Facility: HOSPITAL | Age: 46
End: 2021-07-30

## 2021-07-30 VITALS
WEIGHT: 178.4 LBS | BODY MASS INDEX: 29.72 KG/M2 | HEIGHT: 65 IN | TEMPERATURE: 97.9 F | HEART RATE: 91 BPM | DIASTOLIC BLOOD PRESSURE: 74 MMHG | SYSTOLIC BLOOD PRESSURE: 106 MMHG | RESPIRATION RATE: 16 BRPM | OXYGEN SATURATION: 94 %

## 2021-07-30 LAB
ALBUMIN SERPL-MCNC: 3.1 G/DL (ref 3.5–5.2)
ALBUMIN/GLOB SERPL: 1 G/DL
ALP SERPL-CCNC: 134 U/L (ref 39–117)
ALT SERPL W P-5'-P-CCNC: 38 U/L (ref 1–33)
ANION GAP SERPL CALCULATED.3IONS-SCNC: 10 MMOL/L (ref 5–15)
AST SERPL-CCNC: 38 U/L (ref 1–32)
BASOPHILS # BLD AUTO: 0.01 10*3/MM3 (ref 0–0.2)
BASOPHILS NFR BLD AUTO: 0.4 % (ref 0–1.5)
BILIRUB SERPL-MCNC: 0.3 MG/DL (ref 0–1.2)
BUN SERPL-MCNC: 16 MG/DL (ref 6–20)
BUN/CREAT SERPL: 19.3 (ref 7–25)
CALCIUM SPEC-SCNC: 9.4 MG/DL (ref 8.6–10.5)
CHLORIDE SERPL-SCNC: 104 MMOL/L (ref 98–107)
CO2 SERPL-SCNC: 25 MMOL/L (ref 22–29)
CREAT SERPL-MCNC: 0.83 MG/DL (ref 0.57–1)
DEPRECATED RDW RBC AUTO: 44.2 FL (ref 37–54)
EOSINOPHIL # BLD AUTO: 0.12 10*3/MM3 (ref 0–0.4)
EOSINOPHIL NFR BLD AUTO: 5.2 % (ref 0.3–6.2)
ERYTHROCYTE [DISTWIDTH] IN BLOOD BY AUTOMATED COUNT: 14.2 % (ref 12.3–15.4)
GFR SERPL CREATININE-BSD FRML MDRD: 75 ML/MIN/1.73
GLOBULIN UR ELPH-MCNC: 3 GM/DL
GLUCOSE SERPL-MCNC: 87 MG/DL (ref 65–99)
HCT VFR BLD AUTO: 27.5 % (ref 34–46.6)
HGB BLD-MCNC: 8.9 G/DL (ref 12–15.9)
IMM GRANULOCYTES # BLD AUTO: 0.03 10*3/MM3 (ref 0–0.05)
IMM GRANULOCYTES NFR BLD AUTO: 1.3 % (ref 0–0.5)
LABORATORY COMMENT REPORT: NORMAL
LYMPHOCYTES # BLD AUTO: 0.49 10*3/MM3 (ref 0.7–3.1)
LYMPHOCYTES NFR BLD AUTO: 21.4 % (ref 19.6–45.3)
MAGNESIUM SERPL-MCNC: 1.9 MG/DL (ref 1.6–2.6)
MCH RBC QN AUTO: 27.8 PG (ref 26.6–33)
MCHC RBC AUTO-ENTMCNC: 32.4 G/DL (ref 31.5–35.7)
MCV RBC AUTO: 85.9 FL (ref 79–97)
MONOCYTES # BLD AUTO: 0.28 10*3/MM3 (ref 0.1–0.9)
MONOCYTES NFR BLD AUTO: 12.2 % (ref 5–12)
NEUTROPHILS NFR BLD AUTO: 1.36 10*3/MM3 (ref 1.7–7)
NEUTROPHILS NFR BLD AUTO: 59.5 % (ref 42.7–76)
NRBC BLD AUTO-RTO: 0 /100 WBC (ref 0–0.2)
PHOSPHATE SERPL-MCNC: 6.1 MG/DL (ref 2.5–4.5)
PLATELET # BLD AUTO: 130 10*3/MM3 (ref 140–450)
PMV BLD AUTO: 9.9 FL (ref 6–12)
POTASSIUM SERPL-SCNC: 4.1 MMOL/L (ref 3.5–5.2)
PROT SERPL-MCNC: 6.1 G/DL (ref 6–8.5)
RBC # BLD AUTO: 3.2 10*6/MM3 (ref 3.77–5.28)
SODIUM SERPL-SCNC: 139 MMOL/L (ref 136–145)
T GONDII IGG SERPL IA-ACNC: <3 IU/ML (ref 0–7.1)
T GONDII IGM SER IA-ACNC: <3 AU/ML (ref 0–7.9)
WBC # BLD AUTO: 2.29 10*3/MM3 (ref 3.4–10.8)

## 2021-07-30 PROCEDURE — 84100 ASSAY OF PHOSPHORUS: CPT | Performed by: PHYSICIAN ASSISTANT

## 2021-07-30 PROCEDURE — 85025 COMPLETE CBC W/AUTO DIFF WBC: CPT | Performed by: PHYSICIAN ASSISTANT

## 2021-07-30 PROCEDURE — 63710000001 DIPHENHYDRAMINE PER 50 MG: Performed by: INTERNAL MEDICINE

## 2021-07-30 PROCEDURE — 25010000002 AMPHOTERICIN B LIPOSOME PER 10 MG: Performed by: INTERNAL MEDICINE

## 2021-07-30 PROCEDURE — 25010000002 ONDANSETRON PER 1 MG: Performed by: INTERNAL MEDICINE

## 2021-07-30 PROCEDURE — 83735 ASSAY OF MAGNESIUM: CPT | Performed by: INTERNAL MEDICINE

## 2021-07-30 PROCEDURE — 25010000003 MAGNESIUM SULFATE 4 GM/100ML SOLUTION: Performed by: PHYSICIAN ASSISTANT

## 2021-07-30 PROCEDURE — 80053 COMPREHEN METABOLIC PANEL: CPT | Performed by: INTERNAL MEDICINE

## 2021-07-30 PROCEDURE — 99239 HOSP IP/OBS DSCHRG MGMT >30: CPT | Performed by: INTERNAL MEDICINE

## 2021-07-30 RX ORDER — SULFAMETHOXAZOLE AND TRIMETHOPRIM 800; 160 MG/1; MG/1
1 TABLET ORAL 3 TIMES WEEKLY
Qty: 12 TABLET | Refills: 0 | Status: SHIPPED | OUTPATIENT
Start: 2021-08-02 | End: 2021-09-01

## 2021-07-30 RX ORDER — OXYMETAZOLINE HYDROCHLORIDE 0.05 G/100ML
3 SPRAY NASAL 2 TIMES DAILY PRN
Qty: 30 ML | Refills: 2 | Status: SHIPPED | OUTPATIENT
Start: 2021-07-30 | End: 2021-07-31

## 2021-07-30 RX ORDER — TEMAZEPAM 30 MG/1
30 CAPSULE ORAL NIGHTLY
Qty: 4 CAPSULE | Refills: 0 | Status: SHIPPED | OUTPATIENT
Start: 2021-07-30 | End: 2021-08-03

## 2021-07-30 RX ADMIN — ITRACONAZOLE 2 CAPSULE: 65 CAPSULE, GELATIN COATED ORAL at 09:29

## 2021-07-30 RX ADMIN — GUAIFENESIN 600 MG: 600 TABLET, EXTENDED RELEASE ORAL at 09:29

## 2021-07-30 RX ADMIN — SERTRALINE HYDROCHLORIDE 100 MG: 100 TABLET ORAL at 09:30

## 2021-07-30 RX ADMIN — AMPHOTERICIN B 240 MG: 50 INJECTION, POWDER, LYOPHILIZED, FOR SOLUTION INTRAVENOUS at 10:35

## 2021-07-30 RX ADMIN — DOCUSATE SODIUM 50MG AND SENNOSIDES 8.6MG 2 TABLET: 8.6; 5 TABLET, FILM COATED ORAL at 09:29

## 2021-07-30 RX ADMIN — OXYCODONE HYDROCHLORIDE AND ACETAMINOPHEN 250 MG: 500 TABLET ORAL at 09:31

## 2021-07-30 RX ADMIN — SULFAMETHOXAZOLE AND TRIMETHOPRIM 160 MG: 800; 160 TABLET ORAL at 09:30

## 2021-07-30 RX ADMIN — ONDANSETRON 4 MG: 2 INJECTION INTRAMUSCULAR; INTRAVENOUS at 10:00

## 2021-07-30 RX ADMIN — SODIUM CHLORIDE 500 ML: 9 INJECTION, SOLUTION INTRAVENOUS at 09:23

## 2021-07-30 RX ADMIN — FERROUS SULFATE TAB 325 MG (65 MG ELEMENTAL FE) 325 MG: 325 (65 FE) TAB at 09:29

## 2021-07-30 RX ADMIN — Medication 5000 UNITS: at 09:30

## 2021-07-30 RX ADMIN — PANTOPRAZOLE SODIUM 40 MG: 40 TABLET, DELAYED RELEASE ORAL at 06:30

## 2021-07-30 RX ADMIN — DOCUSATE SODIUM 200 MG: 100 CAPSULE, LIQUID FILLED ORAL at 09:30

## 2021-07-30 RX ADMIN — MAGNESIUM SULFATE HEPTAHYDRATE 4 G: 40 INJECTION, SOLUTION INTRAVENOUS at 12:53

## 2021-07-30 RX ADMIN — DIPHENHYDRAMINE HYDROCHLORIDE 50 MG: 50 CAPSULE ORAL at 10:01

## 2021-07-30 RX ADMIN — ACETAMINOPHEN 650 MG: 325 TABLET ORAL at 09:28

## 2021-08-02 ENCOUNTER — TRANSITIONAL CARE MANAGEMENT TELEPHONE ENCOUNTER (OUTPATIENT)
Dept: CALL CENTER | Facility: HOSPITAL | Age: 46
End: 2021-08-02

## 2021-08-02 NOTE — OUTREACH NOTE
Call Center TCM Note      Responses   [facility] patient discharged from?  [place]   Does the patient have one of the following disease processes/diagnoses(primary or secondary)?  Other   TCM attempt successful?  Yes [Nobody listed on verbal consent]   Discharge diagnosis  Disseminated fungal infection   Does the patient have a primary care provider?   Yes   Does the patient have an appointment with their PCP within 7 days of discharge?  Greater than 7 days   Comments regarding PCP  hospital [place] [facility] appt on 8/9/21 at 1:00 PM   What is preventing the patient from scheduling follow up appointments within 7 days of discharge?  Earlier appointment not available   Nursing Interventions  Verified appointment date/time/provider   Has the patient kept scheduled appointments due by today?  N/A          Rayna Rose RN    8/2/2021, 12:00 EDT

## 2021-08-04 ENCOUNTER — TRANSCRIBE ORDERS (OUTPATIENT)
Dept: LAB | Facility: HOSPITAL | Age: 46
End: 2021-08-04

## 2021-08-04 ENCOUNTER — LAB (OUTPATIENT)
Dept: LAB | Facility: HOSPITAL | Age: 46
End: 2021-08-04

## 2021-08-04 DIAGNOSIS — J96.01 ACUTE RESPIRATORY FAILURE WITH HYPOXIA (HCC): ICD-10-CM

## 2021-08-04 DIAGNOSIS — B39.3 DISSEMINATED HISTOPLASMOSIS CAPSULATI: Primary | ICD-10-CM

## 2021-08-04 DIAGNOSIS — B39.3 DISSEMINATED HISTOPLASMOSIS CAPSULATI: ICD-10-CM

## 2021-08-04 DIAGNOSIS — R74.01 NONSPECIFIC ELEVATION OF LEVELS OF TRANSAMINASE OR LACTIC ACID DEHYDROGENASE (LDH): ICD-10-CM

## 2021-08-04 DIAGNOSIS — B40.7 NORTH AMERICAN DISSEMINATED BLASTOMYCOSIS: ICD-10-CM

## 2021-08-04 DIAGNOSIS — R74.02 NONSPECIFIC ELEVATION OF LEVELS OF TRANSAMINASE OR LACTIC ACID DEHYDROGENASE (LDH): ICD-10-CM

## 2021-08-04 LAB
ALBUMIN SERPL-MCNC: 3.9 G/DL (ref 3.5–5.2)
ALBUMIN/GLOB SERPL: 1.1 G/DL
ALP SERPL-CCNC: 155 U/L (ref 39–117)
ALT SERPL W P-5'-P-CCNC: 31 U/L (ref 1–33)
ANION GAP SERPL CALCULATED.3IONS-SCNC: 12 MMOL/L (ref 5–15)
AST SERPL-CCNC: 29 U/L (ref 1–32)
BASOPHILS # BLD AUTO: 0.01 10*3/MM3 (ref 0–0.2)
BASOPHILS NFR BLD AUTO: 0.4 % (ref 0–1.5)
BILIRUB SERPL-MCNC: 0.3 MG/DL (ref 0–1.2)
BUN SERPL-MCNC: 15 MG/DL (ref 6–20)
BUN/CREAT SERPL: 12.8 (ref 7–25)
CALCIUM SPEC-SCNC: 10.1 MG/DL (ref 8.6–10.5)
CHLORIDE SERPL-SCNC: 102 MMOL/L (ref 98–107)
CO2 SERPL-SCNC: 21 MMOL/L (ref 22–29)
CREAT SERPL-MCNC: 1.17 MG/DL (ref 0.57–1)
DEPRECATED RDW RBC AUTO: 46.2 FL (ref 37–54)
EOSINOPHIL # BLD AUTO: 0.11 10*3/MM3 (ref 0–0.4)
EOSINOPHIL NFR BLD AUTO: 4.2 % (ref 0.3–6.2)
ERYTHROCYTE [DISTWIDTH] IN BLOOD BY AUTOMATED COUNT: 15.3 % (ref 12.3–15.4)
GFR SERPL CREATININE-BSD FRML MDRD: 50 ML/MIN/1.73
GLOBULIN UR ELPH-MCNC: 3.4 GM/DL
GLUCOSE SERPL-MCNC: 102 MG/DL (ref 65–99)
HCT VFR BLD AUTO: 31.5 % (ref 34–46.6)
HGB BLD-MCNC: 10.3 G/DL (ref 12–15.9)
IMM GRANULOCYTES # BLD AUTO: 0.01 10*3/MM3 (ref 0–0.05)
IMM GRANULOCYTES NFR BLD AUTO: 0.4 % (ref 0–0.5)
LYMPHOCYTES # BLD AUTO: 0.49 10*3/MM3 (ref 0.7–3.1)
LYMPHOCYTES NFR BLD AUTO: 18.8 % (ref 19.6–45.3)
MCH RBC QN AUTO: 27.8 PG (ref 26.6–33)
MCHC RBC AUTO-ENTMCNC: 32.7 G/DL (ref 31.5–35.7)
MCV RBC AUTO: 85.1 FL (ref 79–97)
MONOCYTES # BLD AUTO: 0.27 10*3/MM3 (ref 0.1–0.9)
MONOCYTES NFR BLD AUTO: 10.3 % (ref 5–12)
NEUTROPHILS NFR BLD AUTO: 1.72 10*3/MM3 (ref 1.7–7)
NEUTROPHILS NFR BLD AUTO: 65.9 % (ref 42.7–76)
NRBC BLD AUTO-RTO: 0 /100 WBC (ref 0–0.2)
PLATELET # BLD AUTO: 191 10*3/MM3 (ref 140–450)
PMV BLD AUTO: 9.1 FL (ref 6–12)
POTASSIUM SERPL-SCNC: 4.3 MMOL/L (ref 3.5–5.2)
PROT SERPL-MCNC: 7.3 G/DL (ref 6–8.5)
RBC # BLD AUTO: 3.7 10*6/MM3 (ref 3.77–5.28)
SODIUM SERPL-SCNC: 135 MMOL/L (ref 136–145)
WBC # BLD AUTO: 2.61 10*3/MM3 (ref 3.4–10.8)

## 2021-08-04 PROCEDURE — 85025 COMPLETE CBC W/AUTO DIFF WBC: CPT

## 2021-08-04 PROCEDURE — 36415 COLL VENOUS BLD VENIPUNCTURE: CPT

## 2021-08-04 PROCEDURE — 80053 COMPREHEN METABOLIC PANEL: CPT

## 2021-08-09 ENCOUNTER — OFFICE VISIT (OUTPATIENT)
Dept: ONCOLOGY | Facility: CLINIC | Age: 46
End: 2021-08-09

## 2021-08-09 VITALS
WEIGHT: 180.1 LBS | RESPIRATION RATE: 16 BRPM | TEMPERATURE: 98.6 F | BODY MASS INDEX: 30.01 KG/M2 | HEART RATE: 96 BPM | HEIGHT: 65 IN | OXYGEN SATURATION: 98 % | DIASTOLIC BLOOD PRESSURE: 85 MMHG | SYSTOLIC BLOOD PRESSURE: 120 MMHG

## 2021-08-09 DIAGNOSIS — D61.818 PANCYTOPENIA (HCC): Primary | ICD-10-CM

## 2021-08-09 PROCEDURE — 99214 OFFICE O/P EST MOD 30 MIN: CPT | Performed by: INTERNAL MEDICINE

## 2021-08-09 NOTE — PROGRESS NOTES
Follow Up Office Visit      Date: 2021     Patient Name: Elena Copeland  MRN: [id number]  : 1975  Referring Physician: Hospital follow-up    Chief Complaint: Follow-up for pancytopenia    History of Present Illness:  Ms. Copeland is a 45-year-old lady with past medical history of GERD, insomnia, and anxiety who presents to [facility] with persistent fever and shortness of breath.  She states for the past month she has been having fevers daily up to 103.  She has noted a 10 pound weight loss during this time as well as nightly night sweats where she has to change her bed sheets her pajamas.  She had a measure oxygen saturation of 88% on room air.  She has been seen by her PCP who has treated her with a course of azithromycin, steroids, cough medicine with no improvement of her symptoms.  She was referred to pulmonary where she had a CT scan of her chest which showed changes concerning of possible viral pneumonia as well as a 5 mm nodule.  She was also noted to have splenomegaly on imaging.  She denies any tick bites, but states that she was recently in the [place] prior to having her symptoms.  Thus far, labs and imaging have been consistent with splenomegaly as well as elevated inflammatory markers including D-dimer, ferritin, CRP, ESR.  She has a mild elevation in her LFTs as well as anemia with leukopenia and mild thrombocytopenia.    She underwent a bone marrow biopsy with aspiration which not reveal any evidence of malignancy.  However she was noted to have a budding yeast in her bone marrow.  She was subsequently readmitted to the hospital for infectious disease work-up where she was found to be both positive for Blastomyces and histoplasma.  She received induction amphotericin and is currently on itraconazole per ID    Interval History:  Presents to clinic for follow-up.  Overall feeling better since getting her antifungal therapy.  Requiring less oxygen at this time.   Notes that she still has some fatigue with exertion but is able to do more than she has been over the past couple months.  Otherwise doing well continues to follow with ID    Oncology History:    Oncology/Hematology History    No history exists.       Subjective      Review of Systems:   Constitutional: Negative for fevers, chills, or weight loss  Eyes: Negative for blurred vision or discharge         Ear/Nose/Throat: Negative for difficulty swallowing, sore throat, LAD                                                       Respiratory: Negative for cough, SOA, wheezing                                                                                        Cardiovascular: Negative for chest pain or palpitations                                                                  Gastrointestinal: Negative for nausea, vomiting or diarrhea                                                                     Genitourinary: Negative for dysuria or hematuria                                                                                           Musculoskeletal: Negative for any joint pains or muscle aches                                                                        Neurologic: Negative for any weakness, headaches, dizziness                                                                         Hematologic: Negative for any easy bleeding or bruising                                                                                   Psychiatric: Negative for anxiety or depression                          Past Medical History/Past Surgical History/ Family History/ Social History: Reviewed by me and unchanged from my previous documentation done on July 2021.     Medications:     Current Outpatient Medications:   •  albuterol (PROVENTIL) (2.5 MG/3ML) 0.083% nebulizer solution, Take 2.5 mg by nebulization Every 4 (Four) Hours As Needed for Wheezing., Disp: 90 mL, Rfl: 5  •  amitriptyline (ELAVIL) 25 MG tablet, TAKE 1-2  "TABLETS BY MOUTH 30 MINUTES BEFORE BEDTIME., Disp: 45 tablet, Rfl: 1  •  ascorbic acid (VITAMIN C) 100 MG tablet, Take 2.5 tablets by mouth Daily., Disp: 30 tablet, Rfl: 1  •  ferrous gluconate (FERGON) 324 MG tablet, Take 1 tablet by mouth Daily With Breakfast., Disp: 30 tablet, Rfl: 1  •  Itraconazole 65 MG capsule, Take 2 capsules by mouth Daily., Disp: 60 capsule, Rfl: 0  •  omeprazole (PrilOSEC) 20 MG capsule, Take 1 capsule by mouth Daily., Disp: 30 capsule, Rfl: 5  •  propranolol (INDERAL) 20 MG tablet, Take 1/2 to 1 tablet by mouth 3 times a day as needed for anxiety., Disp: 60 tablet, Rfl: 5  •  sertraline (Zoloft) 100 MG tablet, Take 1 tablet by mouth Daily., Disp: 90 tablet, Rfl: 1  •  sulfamethoxazole-trimethoprim (BACTRIM DS,SEPTRA DS) 800-160 MG per tablet, Take 1 tablet by mouth 3 (Three) Times a Week for 30 days. Indications: PJP prophylaxis, Disp: 12 tablet, Rfl: 0  •  vitamin D3 125 MCG (5000 UT) capsule capsule, Take 2,000 Units by mouth Daily., Disp: , Rfl:     Allergies:   Allergies   Allergen Reactions   • Eggs Or Egg-Derived Products    • Sudafed [Pseudoephedrine]        Objective     Physical Exam:  Vital Signs:   Vitals:    08/09/21 1300   BP: 120/85   Pulse: 96   Resp: 16   Temp: 98.6 °F (37 °C)   TempSrc: Temporal   SpO2: 98%   Weight: 81.7 kg (180 lb 1.6 oz)   Height: 165.1 cm (65\")   PainSc: 0-No pain     Pain Score    08/09/21 1300   PainSc: 0-No pain     ECOG Performance Status: 1 - Symptomatic but completely ambulatory    Constitutional: NAD, ECOG 1  Eyes: PERRLA, scleral anicteric  ENT: No LAD, no thyromegaly  Respiratory: CTAB, no wheezing, rales, rhonchi  Cardiovascular: RRR, no murmurs, pulses 2+ bilaterally  Abdomen: soft, NT/ND, no HSM  Musculoskeletal: strength 5/5 bilaterally, no c/c/e  Neurologic: A&O x 3, CN II-XII intact grossly    Results Review:   Lab on 08/04/2021   Component Date Value Ref Range Status   • Glucose 08/04/2021 102* 65 - 99 mg/dL Final   • BUN 08/04/2021 " 15  6 - 20 mg/dL Final   • Creatinine 08/04/2021 1.17* 0.57 - 1.00 mg/dL Final   • Sodium 08/04/2021 135* 136 - 145 mmol/L Final   • Potassium 08/04/2021 4.3  3.5 - 5.2 mmol/L Final   • Chloride 08/04/2021 102  98 - 107 mmol/L Final   • CO2 08/04/2021 21.0* 22.0 - 29.0 mmol/L Final   • Calcium 08/04/2021 10.1  8.6 - 10.5 mg/dL Final   • Total Protein 08/04/2021 7.3  6.0 - 8.5 g/dL Final   • Albumin 08/04/2021 3.90  3.50 - 5.20 g/dL Final   • ALT (SGPT) 08/04/2021 31  1 - 33 U/L Final   • AST (SGOT) 08/04/2021 29  1 - 32 U/L Final   • Alkaline Phosphatase 08/04/2021 155* 39 - 117 U/L Final   • Total Bilirubin 08/04/2021 0.3  0.0 - 1.2 mg/dL Final   • eGFR Non African Amer 08/04/2021 50* >60 mL/min/1.73 Final   • Globulin 08/04/2021 3.4  gm/dL Final   • A/G Ratio 08/04/2021 1.1  g/dL Final   • BUN/Creatinine Ratio 08/04/2021 12.8  7.0 - 25.0 Final   • Anion Gap 08/04/2021 12.0  5.0 - 15.0 mmol/L Final   • WBC 08/04/2021 2.61* 3.40 - 10.80 10*3/mm3 Final   • RBC 08/04/2021 3.70* 3.77 - 5.28 10*6/mm3 Final   • Hemoglobin 08/04/2021 10.3* 12.0 - 15.9 g/dL Final   • Hematocrit 08/04/2021 31.5* 34.0 - 46.6 % Final   • MCV 08/04/2021 85.1  79.0 - 97.0 fL Final   • MCH 08/04/2021 27.8  26.6 - 33.0 pg Final   • MCHC 08/04/2021 32.7  31.5 - 35.7 g/dL Final   • RDW 08/04/2021 15.3  12.3 - 15.4 % Final   • RDW-SD 08/04/2021 46.2  37.0 - 54.0 fl Final   • MPV 08/04/2021 9.1  6.0 - 12.0 fL Final   • Platelets 08/04/2021 191  140 - 450 10*3/mm3 Final   • Neutrophil % 08/04/2021 65.9  42.7 - 76.0 % Final   • Lymphocyte % 08/04/2021 18.8* 19.6 - 45.3 % Final   • Monocyte % 08/04/2021 10.3  5.0 - 12.0 % Final   • Eosinophil % 08/04/2021 4.2  0.3 - 6.2 % Final   • Basophil % 08/04/2021 0.4  0.0 - 1.5 % Final   • Immature Grans % 08/04/2021 0.4  0.0 - 0.5 % Final   • Neutrophils, Absolute 08/04/2021 1.72  1.70 - 7.00 10*3/mm3 Final   • Lymphocytes, Absolute 08/04/2021 0.49* 0.70 - 3.10 10*3/mm3 Final   • Monocytes, Absolute 08/04/2021  0.27  0.10 - 0.90 10*3/mm3 Final   • Eosinophils, Absolute 08/04/2021 0.11  0.00 - 0.40 10*3/mm3 Final   • Basophils, Absolute 08/04/2021 0.01  0.00 - 0.20 10*3/mm3 Final   • Immature Grans, Absolute 08/04/2021 0.01  0.00 - 0.05 10*3/mm3 Final   • nRBC 08/04/2021 0.0  0.0 - 0.2 /100 WBC Final   No results displayed because visit has over 200 results.          CT Abdomen Pelvis With & Without Contrast    Result Date: 7/15/2021  Narrative: EXAMINATION: CT ABDOMEN PELVIS W WO CONTRAST-  INDICATION: rule out Budd Chiari syndrome  TECHNIQUE: Axial CT of the abdomen and pelvis performed without and with IV contrast, arterial, portal venous and delayed phase per CT liver protocol with multiplanar reconstruction  The radiation dose reduction device was turned on for each scan per the ALARA (As Low as Reasonably Achievable) protocol.  COMPARISON: NONE  FINDINGS: The lung bases are grossly clear. Body wall soft tissues are unremarkable. There is no evidence of acute fracture or aggressive osseous lesion. The liver demonstrates normal and homogeneous enhancement without evidence of suspicious focal lesion. There is normal opacification of the hepatic venous structures with flow into the IVC, demonstrating no heterogeneous enhancement or other parenchymal signal abnormality to suggest venous congestion or Budd-Chiari syndrome. There is no biliary ductal dilatation. The gallbladder is normal. Enlarged spleen measuring up to 15 cm, otherwise homogeneous and unremarkable in appearance. The pancreas and bilateral adrenal glands are homogeneous. The kidneys demonstrate symmetric nephrogram and contrast excretion without evidence of hydronephrosis or contour deforming mass. Small and large bowel loops are nondilated. There is no suspicious focal bowel wall thickening. No free fluid or pneumoperitoneum. Normal abdominal aorta. No bulky retroperitoneal adenopathy. The pelvic viscera are unremarkable.      Impression: Normal multiphase  contrast-enhanced appearance of the liver without evidence of suspicious focal lesion, heterogeneous enhancement or other parenchymal abnormality to suggest Budd-Chiari syndrome.  Enlarged but otherwise homogeneous spleen measuring up to 15 cm.  No acute findings in the abdomen and pelvis otherwise.   This report was finalized on 7/15/2021 10:54 AM by Manpreet Milton.      CT Chest Without Contrast Diagnostic    Result Date: 7/28/2021  Narrative: EXAMINATION: CT CHEST WO CONTRAST DIAGNOSTIC- 07/27/2021-  INDICATION: respiratory failure, probable histo +/- blasto, follow up previous ct chest  TECHNIQUE: 5 mm unenhanced images through the chest and upper abdomen  The radiation dose reduction device was turned on for each scan per the ALARA (As Low as Reasonably Achievable) protocol.  COMPARISON: 7/17/2021 chest CT scan  FINDINGS: Patient history indicates disseminated fungal infection, dyspnea, follow-up.  Pattern of predominantly perihilar interstitial disease, greater in the left upper lobe than elsewhere is similar in extent to the prior exam, although disease appears overall locally denser in most areas, particularly the left upper lobe. A small discrete pleural-based nodule in the left upper lobe is minimally increased as well, previously 10 mm, currently 13 mm. There is no pleural effusion. Included images of the upper abdomen show mild diffuse fatty liver change. Spleen measures approximately 16 cm in length. No significant abnormalities are seen included pancreatic tail, adrenal glands or upper renal poles.      Impression: 1. Appearance of mild overall progression of disease since 07/17/2021 exam, with similar extent but denser interstitial changes in the perihilar regions of both lungs. Slightly increased discrete pleural-based nodule in the left upper lobe.  2. No evidence of other new chest disease elsewhere.  3. Splenomegaly again noted.  D:  07/28/2021 E:  07/28/2021  This report was finalized on  7/28/2021 9:20 PM by Dr. David Interiano MD.      MRI Brain With & Without Contrast    Result Date: 7/27/2021  Narrative: EXAMINATION: MRI BRAIN W WO CONTRAST-  INDICATION: Brain abscess; evaluate for abscess, confusion, hypertension.  TECHNIQUE: Routine multiplanar imaging was obtained of the brain with and without the administration of gadolinium contrast.  COMPARISON: None.  FINDINGS: There is no evidence of restricted diffusion to suggest evidence of an acute ischemic insult. Flow voids are preserved in the major intracranial vessels. The brain parenchyma is unremarkable. No hemorrhage or hydrocephalus. No mass, mass effect, or midline shift. No abnormal extraaxial fluid collection is identified. Mucosal thickening in the maxillary sinuses and ethmoid air cells as well as the sphenoid sinuses. Globes and orbits are intact. The mastoid air cells are patent. No cerebellopontine mass identified. Pituitary and sella are unremarkable. Craniovertebral junction is preserved.  No abnormal contrast enhancement. The visualized vascularity is unremarkable in appearance.      Impression: No acute intracranial abnormality present.   D:  07/27/2021 E:  07/27/2021  This report was finalized on 7/27/2021 4:41 PM by Dr. Gem Larose MD.      CT Angiogram Chest    Result Date: 7/17/2021  Narrative: EXAMINATION: CT ANGIOGRAM CHEST-  INDICATION: PE suspected, low/intermediate prob, positive D-dimer  TECHNIQUE: Axial pulmonary arterial phase IV contrast enhanced CT angiogram of the chest per PE protocol. Two-dimensional reconstructions were postprocessed.  The radiation dose reduction device was turned on for each scan per the ALARA (As Low as Reasonably Achievable) protocol.  COMPARISON: NONE  FINDINGS: No pathologic axillary adenopathy or other worrisome body wall soft tissue finding in the chest. No acute findings in the partially imaged upper abdomen. No pleural or pericardial effusion. No pathologic mediastinal or hilar  adenopathy. The osseous structures demonstrate no evidence of acute fracture or aggressive osseous lesion. The pulmonary arteries are well-opacified and without evidence of filling defect concerning for acute pulmonary embolus. Evaluation of the lung parenchyma demonstrates some bilateral central predominant areas of interlobular septal thickening and minimal groundglass opacity with appearance favoring pulmonary edema pattern over possible multifocal pneumonia. A peripheral pleural adjacent 11 mm nodule is present in the left upper lobe.      Impression: No acute pulmonary embolus. Bilateral central predominant areas of fairly symmetric interlobular septal thickening and minimal groundglass opacity, with appearance favoring edema pattern over multifocal atypical pneumonia. Findings not typical of Covid 19 pneumonia. There is 11 mm pleural-based nodule in the left upper lobe which while likely benign would be best followed with 3-6 month follow-up chest CT to ensure stability.   This report was finalized on 7/17/2021 3:31 PM by Manpreet Milton.      XR Chest PA & Lateral    Result Date: 7/29/2021  Narrative: EXAMINATION: XR CHEST, PA AND LATERAL-07/26/2021:  INDICATION: Cough/congestion.  COMPARISON: Chest x-ray 07/01/2021.  FINDINGS: PA and lateral views of the chest reveal cardiac size within normal limits. Bilateral pulmonary opacifications, mid lung predominant, left greater than right, developed in the interim from prior comparison of acute airspace disease. No pneumothorax or pleural effusion.      Impression: Bilateral mid lung predominant pulmonary opacifications, left greater than right, of acute airspace disease developed in the interim from prior comparison.  D:  07/26/2021 E:  07/26/2021  This report was finalized on 7/29/2021 8:03 AM by Dr. Drew Valencia.      CT Bone marrow biopsy and aspiration    Result Date: 7/16/2021  Narrative: EXAMINATION: CT GUIDED BIOPSY BONE MARROW-  INDICATION: Pancytopenia,  referred to interventional radiology for bone marrow biopsy.  TECHNIQUE: After informed written consent, the patient was placed in the prone position on the CT table.  CT of the upper pelvis was performed and an appropriate access point was chosen at skin overlying the right posterior superior iliac spine. The lower back was prepped and draped in normal sterile fashion. After local infiltration of 1% lidocaine and administration of intravenous sedation, a small nick was made in the skin and an 11-gauge Jamshidi needle was advanced until tip abutted the iliac spine. 15 mL of bone marrow aspirate was extracted in a heparinized syringe and sent for sampling. Subsequently, the Jamshidi needle was advanced approximately 4 cm into the iliac bone and a bone marrow plug was extracted. 3 minutes of manual compression was applied with successful hemostasis and a sterile dressing was applied. The patient tolerated the procedure well without evidence of immediate complication.  COMPARISON: NONE  SEDATION: Versed and fentanyl intravenous. Sedation was continuously monitored by intensive care nurse under my direct supervision. Sedation commenced at 905 and procedure was completed at 931       Impression: Successful CT-guided bone marrow biopsy as above.  This report was finalized on 7/16/2021 6:45 PM by Angélica Tripp.      NM Bone Scan 3 Ph Whole Body    Result Date: 7/28/2021  Narrative: EXAMINATION: NM BONE SCAN 3 PH WHOLE BODY-  INDICATION: Osteomyelitis, femur.  TECHNIQUE: RADIOPHARMACEUTICAL: 23.9 mCi of technetium 99m MDP, IV.  COMPARISON: Chest, abdomen and pelvis CT scans of 07/15/2021 and 07/17/2021.  FINDINGS: Anterior and posterior bone flow images are symmetric and appear normal. Blood pool images show no significant focal hyperemia of the right or left thigh.  Four hour delayed images show no asymmetric increased or decreased uptake in either femur. Moderate focal uptake in the lateral left ankle joint is  typical of DJD. There is a small focus of DJD in the left great toe. Additional note is made of a small focus of increased activity in approximately the left lateral sixth rib, and low-level signal uptake in the posterior left fifth rib, but there is no corresponding bony abnormality on the patient's recent CT scan. Trace activity is noted in the medial right ilium that appears to correspond to SI joint DJD. Study otherwise appears unremarkable. Both kidneys are seen to function..      Impression: 1. Negative three-phase bone scan for osteomyelitis of the left femur or elsewhere. 2. Probable DJD of the left ankle and foot. 3. Focal activity of approximately the left lateral sixth rib, and low level segmental activity in the posterior left fifth rib, but with no corresponding abnormality on recent CT scan.  D:  07/28/2021 E:  07/28/2021  This report was finalized on 7/28/2021 9:52 PM by Dr. David Interiano MD.      CT outside films    Result Date: 7/13/2021  Narrative: This procedure was auto-finalized with no dictation required.      Assessment / Plan      Assessment/Plan:   Pancytopenia  Fever of unknown origin  Splenomegaly  -On presentation patient's white count 2.38 hemoglobin 11.8 and a platelet count of 113K in July 2021  -WBC ranging 2.1 and 5.1 during the admission.  Differential predominantly neutrophilic with decreased lymphocytes  -Peripheral smear showing no overt immature cells, blasts, schistocytes  -Repeat haptoglobin 42, low concern for hemolysis at this time  -ADAMTS 13 within normal limits  -Elevated ferritin to 1400, triglycerides normal, mild splenomegaly while inpatient  -Bone marrow biopsy completed on 7/15/2021.  Pathology showing no evidence of malignancy including leukemia or lymphoma.  Did note 5% noncaseating granulomas which could be related to inflammation or infection.  Infectious work-up of her bone marrow showing a budding yeast  -Was seen by infectious disease and diagnosed with  Blastomyces versus histoplasma  -Received induction amphotericin while inpatient and is currently on itraconazole which she is tolerating well  -Most recent CBC in August 2021 with improving blood counts  -We will continue to follow with ID for monitor of her CBC and antifungals  -At this time there is no further hematologic work-up required for her pancytopenia.  Continue to follow with ID and can follow-up with our clinic as needed    Iron deficiency anemia  -Mild iron deficiency noted during inpatient as well as on her bone marrow biopsy  -Currently on ferrous gluconate and tolerating well  -Can have her future iron studies monitored by her PCP    Follow Up:   Follow-up as needed     Rock Lam MD  Hematology and Oncology     Please note that portions of this note may have been completed with a voice recognition program. Efforts were made to edit the dictations, but occasionally words are mistranscribed.

## 2021-08-11 ENCOUNTER — READMISSION MANAGEMENT (OUTPATIENT)
Dept: CALL CENTER | Facility: HOSPITAL | Age: 46
End: 2021-08-11

## 2021-08-11 NOTE — OUTREACH NOTE
Medical Week 2 Survey      Responses   [facility], [place], operated by [facility] patient discharged from?  [place]   Does the patient have one of the following disease processes/diagnoses(primary or secondary)?  Other   Week 2 attempt successful?  No   Unsuccessful attempts  Attempt 1          Marguerite Galvez LPN

## 2021-08-26 ENCOUNTER — LAB (OUTPATIENT)
Dept: LAB | Facility: HOSPITAL | Age: 46
End: 2021-08-26

## 2021-08-26 ENCOUNTER — TRANSCRIBE ORDERS (OUTPATIENT)
Dept: LAB | Facility: HOSPITAL | Age: 46
End: 2021-08-26

## 2021-08-26 DIAGNOSIS — B40.7 NORTH AMERICAN DISSEMINATED BLASTOMYCOSIS: Primary | ICD-10-CM

## 2021-08-26 LAB
ALBUMIN SERPL-MCNC: 4.3 G/DL (ref 3.5–5.2)
ALBUMIN/GLOB SERPL: 1.5 G/DL
ALP SERPL-CCNC: 152 U/L (ref 39–117)
ALT SERPL W P-5'-P-CCNC: 31 U/L (ref 1–33)
ANION GAP SERPL CALCULATED.3IONS-SCNC: 13 MMOL/L (ref 5–15)
AST SERPL-CCNC: 34 U/L (ref 1–32)
BASOPHILS # BLD AUTO: 0.01 10*3/MM3 (ref 0–0.2)
BASOPHILS NFR BLD AUTO: 0.3 % (ref 0–1.5)
BILIRUB SERPL-MCNC: 0.3 MG/DL (ref 0–1.2)
BUN SERPL-MCNC: 8 MG/DL (ref 6–20)
BUN/CREAT SERPL: 10.1 (ref 7–25)
CALCIUM SPEC-SCNC: 8.9 MG/DL (ref 8.6–10.5)
CHLORIDE SERPL-SCNC: 101 MMOL/L (ref 98–107)
CO2 SERPL-SCNC: 21 MMOL/L (ref 22–29)
CREAT SERPL-MCNC: 0.79 MG/DL (ref 0.57–1)
DEPRECATED RDW RBC AUTO: 55.3 FL (ref 37–54)
EOSINOPHIL # BLD AUTO: 0.08 10*3/MM3 (ref 0–0.4)
EOSINOPHIL NFR BLD AUTO: 2.7 % (ref 0.3–6.2)
ERYTHROCYTE [DISTWIDTH] IN BLOOD BY AUTOMATED COUNT: 17.2 % (ref 12.3–15.4)
FUNGUS WND CULT: NORMAL
GFR SERPL CREATININE-BSD FRML MDRD: 79 ML/MIN/1.73
GLOBULIN UR ELPH-MCNC: 2.9 GM/DL
GLUCOSE SERPL-MCNC: 108 MG/DL (ref 65–99)
HCT VFR BLD AUTO: 33.1 % (ref 34–46.6)
HGB BLD-MCNC: 10.7 G/DL (ref 12–15.9)
IMM GRANULOCYTES # BLD AUTO: 0.01 10*3/MM3 (ref 0–0.05)
IMM GRANULOCYTES NFR BLD AUTO: 0.3 % (ref 0–0.5)
LYMPHOCYTES # BLD AUTO: 0.4 10*3/MM3 (ref 0.7–3.1)
LYMPHOCYTES NFR BLD AUTO: 13.6 % (ref 19.6–45.3)
MCH RBC QN AUTO: 28.4 PG (ref 26.6–33)
MCHC RBC AUTO-ENTMCNC: 32.3 G/DL (ref 31.5–35.7)
MCV RBC AUTO: 87.8 FL (ref 79–97)
MONOCYTES # BLD AUTO: 0.28 10*3/MM3 (ref 0.1–0.9)
MONOCYTES NFR BLD AUTO: 9.5 % (ref 5–12)
MYCOBACTERIUM SPEC CULT: NORMAL
NEUTROPHILS NFR BLD AUTO: 2.17 10*3/MM3 (ref 1.7–7)
NEUTROPHILS NFR BLD AUTO: 73.6 % (ref 42.7–76)
NIGHT BLUE STAIN TISS: NORMAL
NRBC BLD AUTO-RTO: 0 /100 WBC (ref 0–0.2)
PLATELET # BLD AUTO: 165 10*3/MM3 (ref 140–450)
PMV BLD AUTO: 9.2 FL (ref 6–12)
POTASSIUM SERPL-SCNC: 4.2 MMOL/L (ref 3.5–5.2)
PROT SERPL-MCNC: 7.2 G/DL (ref 6–8.5)
RBC # BLD AUTO: 3.77 10*6/MM3 (ref 3.77–5.28)
SODIUM SERPL-SCNC: 135 MMOL/L (ref 136–145)
WBC # BLD AUTO: 2.95 10*3/MM3 (ref 3.4–10.8)

## 2021-08-26 PROCEDURE — 80053 COMPREHEN METABOLIC PANEL: CPT | Performed by: INTERNAL MEDICINE

## 2021-08-26 PROCEDURE — 85025 COMPLETE CBC W/AUTO DIFF WBC: CPT | Performed by: INTERNAL MEDICINE

## 2021-08-26 PROCEDURE — 36415 COLL VENOUS BLD VENIPUNCTURE: CPT | Performed by: INTERNAL MEDICINE

## 2021-09-01 LAB — FUNGUS WND CULT: NORMAL

## 2021-09-14 DIAGNOSIS — F51.01 PRIMARY INSOMNIA: ICD-10-CM

## 2021-09-14 RX ORDER — QUETIAPINE FUMARATE 25 MG/1
TABLET, FILM COATED ORAL
Qty: 45 TABLET | Refills: 5 | OUTPATIENT
Start: 2021-09-14

## 2021-09-29 ENCOUNTER — TRANSCRIBE ORDERS (OUTPATIENT)
Dept: LAB | Facility: HOSPITAL | Age: 46
End: 2021-09-29

## 2021-09-29 ENCOUNTER — LAB (OUTPATIENT)
Dept: LAB | Facility: HOSPITAL | Age: 46
End: 2021-09-29

## 2021-09-29 DIAGNOSIS — D72.810 LYMPHOPENIA: Primary | ICD-10-CM

## 2021-09-29 DIAGNOSIS — D72.810 LYMPHOPENIA: ICD-10-CM

## 2021-09-29 LAB
BASOPHILS # BLD AUTO: 0 10*3/MM3 (ref 0–0.2)
BASOPHILS NFR BLD AUTO: 0 % (ref 0–1.5)
DEPRECATED RDW RBC AUTO: 51 FL (ref 37–54)
EOSINOPHIL # BLD AUTO: 0.1 10*3/MM3 (ref 0–0.4)
EOSINOPHIL NFR BLD AUTO: 2.4 % (ref 0.3–6.2)
ERYTHROCYTE [DISTWIDTH] IN BLOOD BY AUTOMATED COUNT: 15.8 % (ref 12.3–15.4)
HCT VFR BLD AUTO: 34.8 % (ref 34–46.6)
HGB BLD-MCNC: 11.4 G/DL (ref 12–15.9)
IMM GRANULOCYTES # BLD AUTO: 0.02 10*3/MM3 (ref 0–0.05)
IMM GRANULOCYTES NFR BLD AUTO: 0.5 % (ref 0–0.5)
LYMPHOCYTES # BLD AUTO: 0.65 10*3/MM3 (ref 0.7–3.1)
LYMPHOCYTES NFR BLD AUTO: 15.9 % (ref 19.6–45.3)
MCH RBC QN AUTO: 28.7 PG (ref 26.6–33)
MCHC RBC AUTO-ENTMCNC: 32.8 G/DL (ref 31.5–35.7)
MCV RBC AUTO: 87.7 FL (ref 79–97)
MONOCYTES # BLD AUTO: 0.39 10*3/MM3 (ref 0.1–0.9)
MONOCYTES NFR BLD AUTO: 9.5 % (ref 5–12)
NEUTROPHILS NFR BLD AUTO: 2.94 10*3/MM3 (ref 1.7–7)
NEUTROPHILS NFR BLD AUTO: 71.7 % (ref 42.7–76)
NRBC BLD AUTO-RTO: 0 /100 WBC (ref 0–0.2)
PLATELET # BLD AUTO: 168 10*3/MM3 (ref 140–450)
PMV BLD AUTO: 8.9 FL (ref 6–12)
RBC # BLD AUTO: 3.97 10*6/MM3 (ref 3.77–5.28)
WBC # BLD AUTO: 4.1 10*3/MM3 (ref 3.4–10.8)

## 2021-09-29 PROCEDURE — 85025 COMPLETE CBC W/AUTO DIFF WBC: CPT

## 2021-09-29 PROCEDURE — 36415 COLL VENOUS BLD VENIPUNCTURE: CPT

## 2021-11-03 ENCOUNTER — OFFICE VISIT (OUTPATIENT)
Dept: PULMONOLOGY | Facility: CLINIC | Age: 46
End: 2021-11-03

## 2021-11-03 VITALS
DIASTOLIC BLOOD PRESSURE: 88 MMHG | HEIGHT: 65 IN | BODY MASS INDEX: 33.29 KG/M2 | OXYGEN SATURATION: 97 % | WEIGHT: 199.8 LBS | HEART RATE: 80 BPM | SYSTOLIC BLOOD PRESSURE: 130 MMHG | TEMPERATURE: 97.9 F

## 2021-11-03 DIAGNOSIS — J16.8 FUNGAL PNEUMONIA: ICD-10-CM

## 2021-11-03 DIAGNOSIS — R06.02 SHORTNESS OF BREATH: Primary | ICD-10-CM

## 2021-11-03 DIAGNOSIS — B49 FUNGAL PNEUMONIA: ICD-10-CM

## 2021-11-03 PROCEDURE — 99213 OFFICE O/P EST LOW 20 MIN: CPT | Performed by: INTERNAL MEDICINE

## 2021-11-03 NOTE — PROGRESS NOTES
"Pulmonary Clinic  Follow-up Visit       Reason for Visit:   [place] is a 46 y.o. female here for follow up of: Cough and Follow-up        S     LAST OFFICE VISIT:  07/13/2021    Doing much better.  No fevers.  No night sweats.  Occ \"hot flushes\".    She is taking Tolsura (Itraconazole).     Tobacco History: She  reports that she quit smoking about 13 years ago. Her smoking use included cigarettes. She has a 1.00 pack-year smoking history. She has never used smokeless tobacco.     Immunization History   Administered Date(s) Administered   • Influenza, Unspecified 02/23/2021        The following portions of the chart were reviewed this encounter and updated as appropriate:    History     Last Reviewed by Domenico Claudio MD on 11/3/2021 at  1:33 PM    Sections Reviewed    Medical, Family, Tobacco, Surgical    Problem list reviewed by Domenico Claudio MD on 11/3/2021 at  1:33 PM  Medicines reviewed by Domenico Claudio MD on 11/3/2021 at  1:33 PM  Allergies reviewed by Domenico Claudio MD on 11/3/2021 at  1:33 PM       O     Visit Vitals  /88   Pulse 80   Temp 97.9 °F (36.6 °C)   Ht 165.1 cm (65\")   Wt 90.6 kg (199 lb 12.8 oz)   LMP  (LMP Unknown)   SpO2 97% Comment: resting, room air   Breastfeeding No   BMI 33.25 kg/m²        Physical Examination    Constitutional:  No acute distress.   Neck: No JVD.   Cardiovascular: Normal rate, regular and rhythm.  No murmurs, gallop or rub.   Respiratory: No adventitious sounds.   Extremities: No Edema     Diagnostic Data    XR Chest PA & Lateral    Result Date: 11/3/2021  Overall seems improved compared to image from July 1st, 2021.     SpO2 Readings from Last 3 Encounters:   11/03/21 97%   08/09/21 98%   07/30/21 94%      Assessment/Plan    A / P     Assessment:    1. 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 (+) Histoplasma Galactomannan Ag (serum) (07/21/2021)  2. (+) GMS Bone Marrow Bx 07/15/2021: Presence of a single budding yeast form.  3. S/P Amphotericin B IV ? Now on " Suba Itraconazole (Tolsura) PO  4. Abnormal CT - OMAYRA - 07/09/21:   1. Nodule in the RLL, 5 mm.  2. Vague right perihilar nodularity 12 mm.  3. Vague pleural-based nodule in the lingula.  4. Left basilar atelectasis  5. Marked Splenomegaly, 16 cm  2. Dyslipidemia   3. GERD on PPI  4. Major depressive disorder.  5. Previous Covid-19 (Dec 2020).      Plan:  Orders Placed This Encounter   Procedures   • XR Chest PA & Lateral   • CT Chest Without Contrast     Patient Instructions   CT Chest follow up  RTC with results    Return in about 2 weeks (around 11/17/2021) for Recheck with Results (CT Scan).    I discussed the diagnosis, evaluation, and  treatment options with the patient and/or appropriate family members.    I spent 21 minutes on this date of service. This time includes time spent by me in the following activities:preparing for the visit, reviewing tests, obtaining and/or reviewing a separately obtained history, performing a medically appropriate examination, counseling the patient/family/caregiver, ordering medications, tests, or procedures, and/or documenting information in the medical record.    C.C.: Jenn Mcclellan, CHAVA

## 2021-12-20 RX ORDER — SERTRALINE HYDROCHLORIDE 100 MG/1
TABLET, FILM COATED ORAL
Qty: 90 TABLET | Refills: 1 | Status: SHIPPED | OUTPATIENT
Start: 2021-12-20

## 2022-02-07 ENCOUNTER — DOCUMENTATION (OUTPATIENT)
Dept: PULMONOLOGY | Facility: CLINIC | Age: 47
End: 2022-02-07

## 2022-03-02 ENCOUNTER — TRANSCRIBE ORDERS (OUTPATIENT)
Dept: LAB | Facility: HOSPITAL | Age: 47
End: 2022-03-02

## 2022-03-02 ENCOUNTER — LAB (OUTPATIENT)
Dept: LAB | Facility: HOSPITAL | Age: 47
End: 2022-03-02

## 2022-03-02 DIAGNOSIS — J96.01 ACUTE RESPIRATORY FAILURE WITH HYPOXIA: Primary | ICD-10-CM

## 2022-03-02 DIAGNOSIS — J96.01 ACUTE RESPIRATORY FAILURE WITH HYPOXIA: ICD-10-CM

## 2022-03-02 LAB
ALBUMIN SERPL-MCNC: 4.7 G/DL (ref 3.5–5.2)
ALBUMIN/GLOB SERPL: 1.4 G/DL
ALP SERPL-CCNC: 196 U/L (ref 39–117)
ALT SERPL W P-5'-P-CCNC: 39 U/L (ref 1–33)
ANION GAP SERPL CALCULATED.3IONS-SCNC: 14 MMOL/L (ref 5–15)
AST SERPL-CCNC: 32 U/L (ref 1–32)
BASOPHILS # BLD AUTO: 0.01 10*3/MM3 (ref 0–0.2)
BASOPHILS NFR BLD AUTO: 0.2 % (ref 0–1.5)
BILIRUB CONJ SERPL-MCNC: 0.2 MG/DL (ref 0–0.3)
BILIRUB SERPL-MCNC: 0.4 MG/DL (ref 0–1.2)
BUN SERPL-MCNC: 6 MG/DL (ref 6–20)
BUN/CREAT SERPL: 8.1 (ref 7–25)
CALCIUM SPEC-SCNC: 9.8 MG/DL (ref 8.6–10.5)
CHLORIDE SERPL-SCNC: 95 MMOL/L (ref 98–107)
CO2 SERPL-SCNC: 26 MMOL/L (ref 22–29)
CREAT SERPL-MCNC: 0.74 MG/DL (ref 0.57–1)
DEPRECATED RDW RBC AUTO: 44.7 FL (ref 37–54)
EGFRCR SERPLBLD CKD-EPI 2021: 102.5 ML/MIN/1.73
EOSINOPHIL # BLD AUTO: 0.06 10*3/MM3 (ref 0–0.4)
EOSINOPHIL NFR BLD AUTO: 1 % (ref 0.3–6.2)
ERYTHROCYTE [DISTWIDTH] IN BLOOD BY AUTOMATED COUNT: 14.6 % (ref 12.3–15.4)
GLOBULIN UR ELPH-MCNC: 3.3 GM/DL
GLUCOSE SERPL-MCNC: 105 MG/DL (ref 65–99)
HCT VFR BLD AUTO: 39.5 % (ref 34–46.6)
HGB BLD-MCNC: 12.9 G/DL (ref 12–15.9)
IMM GRANULOCYTES # BLD AUTO: 0.03 10*3/MM3 (ref 0–0.05)
IMM GRANULOCYTES NFR BLD AUTO: 0.5 % (ref 0–0.5)
LYMPHOCYTES # BLD AUTO: 0.54 10*3/MM3 (ref 0.7–3.1)
LYMPHOCYTES NFR BLD AUTO: 9.3 % (ref 19.6–45.3)
MCH RBC QN AUTO: 27.9 PG (ref 26.6–33)
MCHC RBC AUTO-ENTMCNC: 32.7 G/DL (ref 31.5–35.7)
MCV RBC AUTO: 85.3 FL (ref 79–97)
MONOCYTES # BLD AUTO: 0.31 10*3/MM3 (ref 0.1–0.9)
MONOCYTES NFR BLD AUTO: 5.3 % (ref 5–12)
NEUTROPHILS NFR BLD AUTO: 4.88 10*3/MM3 (ref 1.7–7)
NEUTROPHILS NFR BLD AUTO: 83.7 % (ref 42.7–76)
NRBC BLD AUTO-RTO: 0 /100 WBC (ref 0–0.2)
PLATELET # BLD AUTO: 215 10*3/MM3 (ref 140–450)
PMV BLD AUTO: 9.9 FL (ref 6–12)
POTASSIUM SERPL-SCNC: 3.8 MMOL/L (ref 3.5–5.2)
PROT SERPL-MCNC: 8 G/DL (ref 6–8.5)
RBC # BLD AUTO: 4.63 10*6/MM3 (ref 3.77–5.28)
SODIUM SERPL-SCNC: 135 MMOL/L (ref 136–145)
WBC NRBC COR # BLD: 5.83 10*3/MM3 (ref 3.4–10.8)

## 2022-03-02 PROCEDURE — 80053 COMPREHEN METABOLIC PANEL: CPT

## 2022-03-02 PROCEDURE — 36415 COLL VENOUS BLD VENIPUNCTURE: CPT

## 2022-03-02 PROCEDURE — 85025 COMPLETE CBC W/AUTO DIFF WBC: CPT

## 2022-03-02 PROCEDURE — 82248 BILIRUBIN DIRECT: CPT

## 2022-04-01 ENCOUNTER — LAB (OUTPATIENT)
Dept: LAB | Facility: HOSPITAL | Age: 47
End: 2022-04-01

## 2022-04-01 ENCOUNTER — TRANSCRIBE ORDERS (OUTPATIENT)
Dept: LAB | Facility: HOSPITAL | Age: 47
End: 2022-04-01

## 2022-04-01 DIAGNOSIS — J96.01 ACUTE RESPIRATORY FAILURE WITH HYPOXIA: ICD-10-CM

## 2022-04-01 DIAGNOSIS — J96.01 ACUTE RESPIRATORY FAILURE WITH HYPOXIA: Primary | ICD-10-CM

## 2022-04-01 LAB
ALBUMIN SERPL-MCNC: 4.4 G/DL (ref 3.5–5.2)
ALP SERPL-CCNC: 142 U/L (ref 39–117)
ALT SERPL W P-5'-P-CCNC: 23 U/L (ref 1–33)
AST SERPL-CCNC: 24 U/L (ref 1–32)
BILIRUB CONJ SERPL-MCNC: <0.2 MG/DL (ref 0–0.3)
BILIRUB INDIRECT SERPL-MCNC: ABNORMAL MG/DL
BILIRUB SERPL-MCNC: 0.4 MG/DL (ref 0–1.2)
PROT SERPL-MCNC: 7.8 G/DL (ref 6–8.5)

## 2022-04-01 PROCEDURE — 80076 HEPATIC FUNCTION PANEL: CPT

## 2022-04-01 PROCEDURE — 36415 COLL VENOUS BLD VENIPUNCTURE: CPT

## 2022-06-30 ENCOUNTER — LAB (OUTPATIENT)
Dept: LAB | Facility: HOSPITAL | Age: 47
End: 2022-06-30

## 2022-06-30 ENCOUNTER — TRANSCRIBE ORDERS (OUTPATIENT)
Dept: LAB | Facility: HOSPITAL | Age: 47
End: 2022-06-30

## 2022-06-30 DIAGNOSIS — D61.818 ACQUIRED PANCYTOPENIA: ICD-10-CM

## 2022-06-30 DIAGNOSIS — D72.810 LYMPHOPENIA: Primary | ICD-10-CM

## 2022-06-30 DIAGNOSIS — D72.810 LYMPHOPENIA: ICD-10-CM

## 2022-06-30 DIAGNOSIS — B40.7 NORTH AMERICAN DISSEMINATED BLASTOMYCOSIS: ICD-10-CM

## 2022-06-30 LAB
ALBUMIN SERPL-MCNC: 4.4 G/DL (ref 3.5–5.2)
ALBUMIN/GLOB SERPL: 1.4 G/DL
ALP SERPL-CCNC: 155 U/L (ref 39–117)
ALT SERPL W P-5'-P-CCNC: 15 U/L (ref 1–33)
ANION GAP SERPL CALCULATED.3IONS-SCNC: 10 MMOL/L (ref 5–15)
AST SERPL-CCNC: 15 U/L (ref 1–32)
BASOPHILS # BLD AUTO: 0.01 10*3/MM3 (ref 0–0.2)
BASOPHILS NFR BLD AUTO: 0.2 % (ref 0–1.5)
BILIRUB SERPL-MCNC: 0.3 MG/DL (ref 0–1.2)
BUN SERPL-MCNC: 12 MG/DL (ref 6–20)
BUN/CREAT SERPL: 16.4 (ref 7–25)
CALCIUM SPEC-SCNC: 9.6 MG/DL (ref 8.6–10.5)
CHLORIDE SERPL-SCNC: 98 MMOL/L (ref 98–107)
CO2 SERPL-SCNC: 29 MMOL/L (ref 22–29)
CREAT SERPL-MCNC: 0.73 MG/DL (ref 0.57–1)
DEPRECATED RDW RBC AUTO: 43.4 FL (ref 37–54)
EGFRCR SERPLBLD CKD-EPI 2021: 104.1 ML/MIN/1.73
EOSINOPHIL # BLD AUTO: 0.07 10*3/MM3 (ref 0–0.4)
EOSINOPHIL NFR BLD AUTO: 1.3 % (ref 0.3–6.2)
ERYTHROCYTE [DISTWIDTH] IN BLOOD BY AUTOMATED COUNT: 13.4 % (ref 12.3–15.4)
GLOBULIN UR ELPH-MCNC: 3.2 GM/DL
GLUCOSE SERPL-MCNC: 110 MG/DL (ref 65–99)
HCT VFR BLD AUTO: 38.6 % (ref 34–46.6)
HGB BLD-MCNC: 12.7 G/DL (ref 12–15.9)
IMM GRANULOCYTES # BLD AUTO: 0.02 10*3/MM3 (ref 0–0.05)
IMM GRANULOCYTES NFR BLD AUTO: 0.4 % (ref 0–0.5)
LYMPHOCYTES # BLD AUTO: 0.57 10*3/MM3 (ref 0.7–3.1)
LYMPHOCYTES NFR BLD AUTO: 11 % (ref 19.6–45.3)
MCH RBC QN AUTO: 28.8 PG (ref 26.6–33)
MCHC RBC AUTO-ENTMCNC: 32.9 G/DL (ref 31.5–35.7)
MCV RBC AUTO: 87.5 FL (ref 79–97)
MONOCYTES # BLD AUTO: 0.27 10*3/MM3 (ref 0.1–0.9)
MONOCYTES NFR BLD AUTO: 5.2 % (ref 5–12)
NEUTROPHILS NFR BLD AUTO: 4.26 10*3/MM3 (ref 1.7–7)
NEUTROPHILS NFR BLD AUTO: 81.9 % (ref 42.7–76)
NRBC BLD AUTO-RTO: 0 /100 WBC (ref 0–0.2)
PLATELET # BLD AUTO: 206 10*3/MM3 (ref 140–450)
PMV BLD AUTO: 9.5 FL (ref 6–12)
POTASSIUM SERPL-SCNC: 4.1 MMOL/L (ref 3.5–5.2)
PROT SERPL-MCNC: 7.6 G/DL (ref 6–8.5)
RBC # BLD AUTO: 4.41 10*6/MM3 (ref 3.77–5.28)
SODIUM SERPL-SCNC: 137 MMOL/L (ref 136–145)
WBC NRBC COR # BLD: 5.2 10*3/MM3 (ref 3.4–10.8)

## 2022-06-30 PROCEDURE — 85025 COMPLETE CBC W/AUTO DIFF WBC: CPT

## 2022-06-30 PROCEDURE — 80053 COMPREHEN METABOLIC PANEL: CPT

## 2022-06-30 PROCEDURE — 36415 COLL VENOUS BLD VENIPUNCTURE: CPT

## 2022-08-18 ENCOUNTER — LAB (OUTPATIENT)
Dept: LAB | Facility: HOSPITAL | Age: 47
End: 2022-08-18

## 2022-08-18 ENCOUNTER — TRANSCRIBE ORDERS (OUTPATIENT)
Dept: LAB | Facility: HOSPITAL | Age: 47
End: 2022-08-18

## 2022-08-18 DIAGNOSIS — B39.3 DISSEMINATED HISTOPLASMOSIS CAPSULATI: ICD-10-CM

## 2022-08-18 DIAGNOSIS — B39.3 DISSEMINATED HISTOPLASMOSIS CAPSULATI: Primary | ICD-10-CM

## 2022-08-18 LAB
ALBUMIN SERPL-MCNC: 4.9 G/DL (ref 3.5–5.2)
ALBUMIN/GLOB SERPL: 1.3 G/DL
ALP SERPL-CCNC: 203 U/L (ref 39–117)
ALT SERPL W P-5'-P-CCNC: 24 U/L (ref 1–33)
ANION GAP SERPL CALCULATED.3IONS-SCNC: 12 MMOL/L (ref 5–15)
AST SERPL-CCNC: 23 U/L (ref 1–32)
BASOPHILS # BLD AUTO: 0.01 10*3/MM3 (ref 0–0.2)
BASOPHILS NFR BLD AUTO: 0.2 % (ref 0–1.5)
BILIRUB SERPL-MCNC: 0.5 MG/DL (ref 0–1.2)
BUN SERPL-MCNC: 7 MG/DL (ref 6–20)
BUN/CREAT SERPL: 9.6 (ref 7–25)
CALCIUM SPEC-SCNC: 10.2 MG/DL (ref 8.6–10.5)
CHLORIDE SERPL-SCNC: 95 MMOL/L (ref 98–107)
CO2 SERPL-SCNC: 27 MMOL/L (ref 22–29)
CREAT SERPL-MCNC: 0.73 MG/DL (ref 0.57–1)
DEPRECATED RDW RBC AUTO: 39.7 FL (ref 37–54)
EGFRCR SERPLBLD CKD-EPI 2021: 104.1 ML/MIN/1.73
EOSINOPHIL # BLD AUTO: 0.07 10*3/MM3 (ref 0–0.4)
EOSINOPHIL NFR BLD AUTO: 1.3 % (ref 0.3–6.2)
ERYTHROCYTE [DISTWIDTH] IN BLOOD BY AUTOMATED COUNT: 12.8 % (ref 12.3–15.4)
GLOBULIN UR ELPH-MCNC: 3.7 GM/DL
GLUCOSE SERPL-MCNC: 83 MG/DL (ref 65–99)
HCT VFR BLD AUTO: 44 % (ref 34–46.6)
HGB BLD-MCNC: 14.8 G/DL (ref 12–15.9)
IMM GRANULOCYTES # BLD AUTO: 0.02 10*3/MM3 (ref 0–0.05)
IMM GRANULOCYTES NFR BLD AUTO: 0.4 % (ref 0–0.5)
LYMPHOCYTES # BLD AUTO: 0.67 10*3/MM3 (ref 0.7–3.1)
LYMPHOCYTES NFR BLD AUTO: 12.2 % (ref 19.6–45.3)
MCH RBC QN AUTO: 28.8 PG (ref 26.6–33)
MCHC RBC AUTO-ENTMCNC: 33.6 G/DL (ref 31.5–35.7)
MCV RBC AUTO: 85.6 FL (ref 79–97)
MONOCYTES # BLD AUTO: 0.35 10*3/MM3 (ref 0.1–0.9)
MONOCYTES NFR BLD AUTO: 6.4 % (ref 5–12)
NEUTROPHILS NFR BLD AUTO: 4.35 10*3/MM3 (ref 1.7–7)
NEUTROPHILS NFR BLD AUTO: 79.5 % (ref 42.7–76)
NRBC BLD AUTO-RTO: 0 /100 WBC (ref 0–0.2)
PLATELET # BLD AUTO: 227 10*3/MM3 (ref 140–450)
PMV BLD AUTO: 10.4 FL (ref 6–12)
POTASSIUM SERPL-SCNC: 4 MMOL/L (ref 3.5–5.2)
PROT SERPL-MCNC: 8.6 G/DL (ref 6–8.5)
RBC # BLD AUTO: 5.14 10*6/MM3 (ref 3.77–5.28)
SODIUM SERPL-SCNC: 134 MMOL/L (ref 136–145)
WBC NRBC COR # BLD: 5.47 10*3/MM3 (ref 3.4–10.8)

## 2022-08-18 PROCEDURE — 80053 COMPREHEN METABOLIC PANEL: CPT

## 2022-08-18 PROCEDURE — 36415 COLL VENOUS BLD VENIPUNCTURE: CPT

## 2022-08-18 PROCEDURE — 85025 COMPLETE CBC W/AUTO DIFF WBC: CPT

## 2023-01-10 ENCOUNTER — LAB (OUTPATIENT)
Dept: LAB | Facility: HOSPITAL | Age: 48
End: 2023-01-10

## 2023-01-10 ENCOUNTER — TRANSCRIBE ORDERS (OUTPATIENT)
Dept: LAB | Facility: HOSPITAL | Age: 48
End: 2023-01-10
Payer: MEDICAID

## 2023-01-10 DIAGNOSIS — B40.7 NORTH AMERICAN DISSEMINATED BLASTOMYCOSIS: ICD-10-CM

## 2023-01-10 DIAGNOSIS — R74.01 NONSPECIFIC ELEVATION OF LEVELS OF TRANSAMINASE OR LACTIC ACID DEHYDROGENASE (LDH): ICD-10-CM

## 2023-01-10 DIAGNOSIS — R74.02 NONSPECIFIC ELEVATION OF LEVELS OF TRANSAMINASE OR LACTIC ACID DEHYDROGENASE (LDH): ICD-10-CM

## 2023-01-10 DIAGNOSIS — F32.9 MAJOR DEPRESSIVE DISORDER WITH SINGLE EPISODE, REMISSION STATUS UNSPECIFIED: ICD-10-CM

## 2023-01-10 DIAGNOSIS — B39.3 DISSEMINATED HISTOPLASMOSIS CAPSULATI: ICD-10-CM

## 2023-01-10 DIAGNOSIS — E66.09 EXOGENOUS OBESITY: ICD-10-CM

## 2023-01-10 DIAGNOSIS — J96.01 ACUTE RESPIRATORY FAILURE WITH HYPOXIA: ICD-10-CM

## 2023-01-10 DIAGNOSIS — R11.0 NAUSEA: Primary | ICD-10-CM

## 2023-01-10 DIAGNOSIS — R11.0 NAUSEA: ICD-10-CM

## 2023-01-10 LAB
ALBUMIN SERPL-MCNC: 4.4 G/DL (ref 3.5–5.2)
ALBUMIN/GLOB SERPL: 1.6 G/DL
ALP SERPL-CCNC: 153 U/L (ref 39–117)
ALT SERPL W P-5'-P-CCNC: 21 U/L (ref 1–33)
ANION GAP SERPL CALCULATED.3IONS-SCNC: 10 MMOL/L (ref 5–15)
AST SERPL-CCNC: 24 U/L (ref 1–32)
BASOPHILS # BLD AUTO: 0 10*3/MM3 (ref 0–0.2)
BASOPHILS NFR BLD AUTO: 0 % (ref 0–1.5)
BILIRUB SERPL-MCNC: 0.2 MG/DL (ref 0–1.2)
BUN SERPL-MCNC: 4 MG/DL (ref 6–20)
BUN/CREAT SERPL: 6.7 (ref 7–25)
CALCIUM SPEC-SCNC: 9 MG/DL (ref 8.6–10.5)
CHLORIDE SERPL-SCNC: 103 MMOL/L (ref 98–107)
CO2 SERPL-SCNC: 26 MMOL/L (ref 22–29)
CREAT SERPL-MCNC: 0.6 MG/DL (ref 0.57–1)
DEPRECATED RDW RBC AUTO: 44.6 FL (ref 37–54)
EGFRCR SERPLBLD CKD-EPI 2021: 113 ML/MIN/1.73
EOSINOPHIL # BLD AUTO: 0.09 10*3/MM3 (ref 0–0.4)
EOSINOPHIL NFR BLD AUTO: 2 % (ref 0.3–6.2)
ERYTHROCYTE [DISTWIDTH] IN BLOOD BY AUTOMATED COUNT: 13.8 % (ref 12.3–15.4)
GLOBULIN UR ELPH-MCNC: 2.8 GM/DL
GLUCOSE SERPL-MCNC: 89 MG/DL (ref 65–99)
HCT VFR BLD AUTO: 36.6 % (ref 34–46.6)
HGB BLD-MCNC: 11.9 G/DL (ref 12–15.9)
IMM GRANULOCYTES # BLD AUTO: 0.01 10*3/MM3 (ref 0–0.05)
IMM GRANULOCYTES NFR BLD AUTO: 0.2 % (ref 0–0.5)
LYMPHOCYTES # BLD AUTO: 0.7 10*3/MM3 (ref 0.7–3.1)
LYMPHOCYTES NFR BLD AUTO: 15.8 % (ref 19.6–45.3)
MCH RBC QN AUTO: 29 PG (ref 26.6–33)
MCHC RBC AUTO-ENTMCNC: 32.5 G/DL (ref 31.5–35.7)
MCV RBC AUTO: 89.1 FL (ref 79–97)
MONOCYTES # BLD AUTO: 0.31 10*3/MM3 (ref 0.1–0.9)
MONOCYTES NFR BLD AUTO: 7 % (ref 5–12)
NEUTROPHILS NFR BLD AUTO: 3.32 10*3/MM3 (ref 1.7–7)
NEUTROPHILS NFR BLD AUTO: 75 % (ref 42.7–76)
NRBC BLD AUTO-RTO: 0 /100 WBC (ref 0–0.2)
PLATELET # BLD AUTO: 205 10*3/MM3 (ref 140–450)
PMV BLD AUTO: 10.2 FL (ref 6–12)
POTASSIUM SERPL-SCNC: 4 MMOL/L (ref 3.5–5.2)
PROT SERPL-MCNC: 7.2 G/DL (ref 6–8.5)
RBC # BLD AUTO: 4.11 10*6/MM3 (ref 3.77–5.28)
SODIUM SERPL-SCNC: 139 MMOL/L (ref 136–145)
WBC NRBC COR # BLD: 4.43 10*3/MM3 (ref 3.4–10.8)

## 2023-01-10 PROCEDURE — 85025 COMPLETE CBC W/AUTO DIFF WBC: CPT

## 2023-01-10 PROCEDURE — 80053 COMPREHEN METABOLIC PANEL: CPT

## 2023-01-10 PROCEDURE — 36415 COLL VENOUS BLD VENIPUNCTURE: CPT

## 2023-01-10 PROCEDURE — 86790 VIRUS ANTIBODY NOS: CPT

## 2023-01-13 LAB — HTLV I+II AB SER QL IA: NEGATIVE

## 2023-04-28 ENCOUNTER — LAB (OUTPATIENT)
Dept: LAB | Facility: HOSPITAL | Age: 48
End: 2023-04-28

## 2023-04-28 ENCOUNTER — TRANSCRIBE ORDERS (OUTPATIENT)
Dept: LAB | Facility: HOSPITAL | Age: 48
End: 2023-04-28

## 2023-04-28 DIAGNOSIS — R74.02 NONSPECIFIC ELEVATION OF LEVELS OF TRANSAMINASE OR LACTIC ACID DEHYDROGENASE (LDH): ICD-10-CM

## 2023-04-28 DIAGNOSIS — E66.09 EXOGENOUS OBESITY: ICD-10-CM

## 2023-04-28 DIAGNOSIS — B39.3 DISSEMINATED HISTOPLASMOSIS CAPSULATI: ICD-10-CM

## 2023-04-28 DIAGNOSIS — B40.7 NORTH AMERICAN DISSEMINATED BLASTOMYCOSIS: ICD-10-CM

## 2023-04-28 DIAGNOSIS — R11.0 NAUSEA: ICD-10-CM

## 2023-04-28 DIAGNOSIS — D72.810 LYMPHOPENIA: ICD-10-CM

## 2023-04-28 DIAGNOSIS — F32.9 MAJOR DEPRESSIVE DISORDER WITH SINGLE EPISODE, REMISSION STATUS UNSPECIFIED: ICD-10-CM

## 2023-04-28 DIAGNOSIS — R74.01 NONSPECIFIC ELEVATION OF LEVELS OF TRANSAMINASE OR LACTIC ACID DEHYDROGENASE (LDH): ICD-10-CM

## 2023-04-28 DIAGNOSIS — J96.01 ACUTE RESPIRATORY FAILURE WITH HYPOXIA: ICD-10-CM

## 2023-04-28 DIAGNOSIS — R11.0 NAUSEA: Primary | ICD-10-CM

## 2023-04-28 LAB
ALBUMIN SERPL-MCNC: 4.4 G/DL (ref 3.5–5.2)
ALBUMIN/GLOB SERPL: 1.5 G/DL
ALP SERPL-CCNC: 150 U/L (ref 39–117)
ALT SERPL W P-5'-P-CCNC: 16 U/L (ref 1–33)
ANION GAP SERPL CALCULATED.3IONS-SCNC: 12 MMOL/L (ref 5–15)
AST SERPL-CCNC: 20 U/L (ref 1–32)
BASOPHILS # BLD AUTO: 0.01 10*3/MM3 (ref 0–0.2)
BASOPHILS NFR BLD AUTO: 0.2 % (ref 0–1.5)
BILIRUB SERPL-MCNC: 0.3 MG/DL (ref 0–1.2)
BUN SERPL-MCNC: 9 MG/DL (ref 6–20)
BUN/CREAT SERPL: 13.4 (ref 7–25)
CALCIUM SPEC-SCNC: 9 MG/DL (ref 8.6–10.5)
CHLORIDE SERPL-SCNC: 102 MMOL/L (ref 98–107)
CO2 SERPL-SCNC: 24 MMOL/L (ref 22–29)
CREAT SERPL-MCNC: 0.67 MG/DL (ref 0.57–1)
DEPRECATED RDW RBC AUTO: 42.6 FL (ref 37–54)
EGFRCR SERPLBLD CKD-EPI 2021: 110 ML/MIN/1.73
EOSINOPHIL # BLD AUTO: 0.07 10*3/MM3 (ref 0–0.4)
EOSINOPHIL NFR BLD AUTO: 1.7 % (ref 0.3–6.2)
ERYTHROCYTE [DISTWIDTH] IN BLOOD BY AUTOMATED COUNT: 13.3 % (ref 12.3–15.4)
GLOBULIN UR ELPH-MCNC: 2.9 GM/DL
GLUCOSE SERPL-MCNC: 93 MG/DL (ref 65–99)
HCT VFR BLD AUTO: 38.3 % (ref 34–46.6)
HGB BLD-MCNC: 12.6 G/DL (ref 12–15.9)
IMM GRANULOCYTES # BLD AUTO: 0.01 10*3/MM3 (ref 0–0.05)
IMM GRANULOCYTES NFR BLD AUTO: 0.2 % (ref 0–0.5)
LYMPHOCYTES # BLD AUTO: 0.56 10*3/MM3 (ref 0.7–3.1)
LYMPHOCYTES NFR BLD AUTO: 13.9 % (ref 19.6–45.3)
MCH RBC QN AUTO: 29.1 PG (ref 26.6–33)
MCHC RBC AUTO-ENTMCNC: 32.9 G/DL (ref 31.5–35.7)
MCV RBC AUTO: 88.5 FL (ref 79–97)
MONOCYTES # BLD AUTO: 0.36 10*3/MM3 (ref 0.1–0.9)
MONOCYTES NFR BLD AUTO: 8.9 % (ref 5–12)
NEUTROPHILS NFR BLD AUTO: 3.02 10*3/MM3 (ref 1.7–7)
NEUTROPHILS NFR BLD AUTO: 75.1 % (ref 42.7–76)
NRBC BLD AUTO-RTO: 0 /100 WBC (ref 0–0.2)
PLATELET # BLD AUTO: 179 10*3/MM3 (ref 140–450)
PMV BLD AUTO: 9.9 FL (ref 6–12)
POTASSIUM SERPL-SCNC: 4.8 MMOL/L (ref 3.5–5.2)
PROT SERPL-MCNC: 7.3 G/DL (ref 6–8.5)
RBC # BLD AUTO: 4.33 10*6/MM3 (ref 3.77–5.28)
SODIUM SERPL-SCNC: 138 MMOL/L (ref 136–145)
WBC NRBC COR # BLD: 4.03 10*3/MM3 (ref 3.4–10.8)

## 2023-04-28 PROCEDURE — 36415 COLL VENOUS BLD VENIPUNCTURE: CPT

## 2023-04-28 PROCEDURE — 85025 COMPLETE CBC W/AUTO DIFF WBC: CPT

## 2023-04-28 PROCEDURE — 80053 COMPREHEN METABOLIC PANEL: CPT
